# Patient Record
Sex: FEMALE | Race: WHITE
[De-identification: names, ages, dates, MRNs, and addresses within clinical notes are randomized per-mention and may not be internally consistent; named-entity substitution may affect disease eponyms.]

---

## 2018-03-01 ENCOUNTER — HOSPITAL ENCOUNTER (EMERGENCY)
Dept: HOSPITAL 68 - ERH | Age: 33
Discharge: STILL ADMITTED OR ADMINISTRATIVE | End: 2018-03-01
Payer: COMMERCIAL

## 2018-03-01 DIAGNOSIS — M54.5: Primary | ICD-10-CM

## 2018-04-28 ENCOUNTER — HOSPITAL ENCOUNTER (INPATIENT)
Dept: HOSPITAL 68 - ERH | Age: 33
LOS: 11 days | DRG: 280 | End: 2018-05-09
Attending: INTERNAL MEDICINE | Admitting: INTERNAL MEDICINE
Payer: COMMERCIAL

## 2018-04-28 VITALS — SYSTOLIC BLOOD PRESSURE: 109 MMHG | DIASTOLIC BLOOD PRESSURE: 65 MMHG

## 2018-04-28 VITALS — DIASTOLIC BLOOD PRESSURE: 59 MMHG | SYSTOLIC BLOOD PRESSURE: 112 MMHG

## 2018-04-28 VITALS — HEIGHT: 66 IN | BODY MASS INDEX: 18.8 KG/M2 | WEIGHT: 117 LBS

## 2018-04-28 DIAGNOSIS — D62: ICD-10-CM

## 2018-04-28 DIAGNOSIS — K29.80: ICD-10-CM

## 2018-04-28 DIAGNOSIS — E87.1: ICD-10-CM

## 2018-04-28 DIAGNOSIS — E87.79: ICD-10-CM

## 2018-04-28 DIAGNOSIS — K31.89: ICD-10-CM

## 2018-04-28 DIAGNOSIS — K85.90: ICD-10-CM

## 2018-04-28 DIAGNOSIS — D68.9: ICD-10-CM

## 2018-04-28 DIAGNOSIS — I85.01: ICD-10-CM

## 2018-04-28 DIAGNOSIS — K70.31: Primary | ICD-10-CM
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
## 2018-04-28 DIAGNOSIS — F10.20: ICD-10-CM

## 2018-04-28 DIAGNOSIS — D68.32: ICD-10-CM

## 2018-04-28 DIAGNOSIS — K70.11: ICD-10-CM

## 2018-04-28 DIAGNOSIS — N17.9: ICD-10-CM

## 2018-04-28 DIAGNOSIS — R27.8: ICD-10-CM

## 2018-04-28 DIAGNOSIS — K76.6: ICD-10-CM

## 2018-04-28 LAB
ABSOLUTE GRANULOCYTE CT: 12.7 /CUMM (ref 1.4–6.5)
APTT BLD: 30 SEC (ref 25–37)
BASOPHILS # BLD: 0 /CUMM (ref 0–0.2)
BASOPHILS NFR BLD: 0.2 % (ref 0–2)
EOSINOPHIL # BLD: 0.1 /CUMM (ref 0–0.7)
EOSINOPHIL NFR BLD: 0.4 % (ref 0–5)
ERYTHROCYTE [DISTWIDTH] IN BLOOD BY AUTOMATED COUNT: 14.1 % (ref 11.5–14.5)
GRANULOCYTES NFR BLD: 82.7 % (ref 42.2–75.2)
HCT VFR BLD CALC: 20.1 % (ref 37–47)
LYMPHOCYTES # BLD: 1.1 /CUMM (ref 1.2–3.4)
MCH RBC QN AUTO: 39 PG (ref 27–31)
MCHC RBC AUTO-ENTMCNC: 34.5 G/DL (ref 33–37)
MCV RBC AUTO: 113.3 FL (ref 81–99)
MONOCYTES # BLD: 1.5 /CUMM (ref 0.1–0.6)
PLATELET # BLD: 216 /CUMM (ref 130–400)
PMV BLD AUTO: 7.9 FL (ref 7.4–10.4)
PROTHROMBIN TIME: 15.1 SEC (ref 9.4–12.5)
RED BLOOD CELL CT: 1.78 /CUMM (ref 4.2–5.4)
WBC # BLD AUTO: 15.4 /CUMM (ref 4.8–10.8)

## 2018-04-28 PROCEDURE — 2NASP: CPT

## 2018-04-28 PROCEDURE — 84300 ASSAY OF URINE SODIUM: CPT

## 2018-04-28 PROCEDURE — 0W9G3ZX DRAINAGE OF PERITONEAL CAVITY, PERCUTANEOUS APPROACH, DIAGNOSTIC: ICD-10-PCS | Performed by: INTERNAL MEDICINE

## 2018-04-28 PROCEDURE — 84133 ASSAY OF URINE POTASSIUM: CPT

## 2018-04-28 PROCEDURE — G0480 DRUG TEST DEF 1-7 CLASSES: HCPCS

## 2018-04-28 PROCEDURE — P9016 RBC LEUKOCYTES REDUCED: HCPCS

## 2018-04-28 PROCEDURE — P9047 ALBUMIN (HUMAN), 25%, 50ML: HCPCS

## 2018-04-28 PROCEDURE — 30233N1 TRANSFUSION OF NONAUTOLOGOUS RED BLOOD CELLS INTO PERIPHERAL VEIN, PERCUTANEOUS APPROACH: ICD-10-PCS | Performed by: INTERNAL MEDICINE

## 2018-04-28 PROCEDURE — 87075 CULTR BACTERIA EXCEPT BLOOD: CPT

## 2018-04-28 NOTE — ULTRASOUND REPORT
EXAMINATION:
ABDOMINAL ULTRASOUND LIMITED
 
CLINICAL INFORMATION:
Abdominal pain. Ascites. Jaundice.
 
COMPARISON:
03/15/2018.
 
TECHNIQUE:
Real-time imaging of the right upper quadrant abdominal viscera.
 
FINDINGS:
PANCREAS: The visualized pancreatic head and body are normal in appearance.
The remainder of the pancreas is obscured from visualization by the overlying
bowel gas.
 
LIVER: The liver is of normal size and mildly coarsened echogenicity without
focal lesions nor intrahepatic biliary ductal dilation. There is subcapsular
nodularity demonstrable.
 
GALLBLADDER: There is no cholelithiasis. There is echogenic bile within the
gallbladder lumen. There is gallbladder wall thickening measuring
approximately 6 mm.
 
COMMON BILE DUCT: Normal in caliber measuring 0.3 cm in diameter.
 
RIGHT KIDNEY: Normal. No hydronephrosis. No renal calculi or focal
parenchymal lesions.  The kidney measures 11.2 cm in maximum dimension.
 
FREE FLUID: There is a moderate amount of free fluid noted within the upper
abdomen.
 
IMPRESSION:
Liver of coarse and echogenicity without focal lesions. There is subcapsular
nodularity. The appearance is nonspecific, but could be representative of
cirrhosis.
 
No hepatic mass lesions are demonstrable.
 
There is echogenic bile without cholelithiasis. There is gallbladder wall
thickening, likely accentuated due to adjacent ascites.
 
Moderate amount of ascites.

## 2018-04-28 NOTE — ED GENERAL ADULT
History of Present Illness
 
General
Chief Complaint: General Adult
Stated Complaint: BLOOD WORK
Source: patient, old records
Exam Limitations: no limitations
 
Vital Signs & Intake/Output
Vital Signs & Intake/Output
 Vital Signs
 
 
Date Time Temp Pulse Resp B/P B/P Pulse O2 O2 Flow FiO2
 
     Mean Ox Delivery Rate 
 
 1637 97.9 100 18 106/60  96 Room Air  
 
 1242 96.6 111 18 109/64  99 Room Air  
 
 
 
Allergies
Coded Allergies:
No Known Allergies (03/15/18)
 
Reconcile Medications
Calcium Carb/Magnesium Hydrox (Rolaids Chewable Tablet) (Unknown Strength) 
TAB.CHEW   (Unknown Dose) PO DAILY SUPPLEMENT  (Reported)
Furosemide 20 MG TABLET   1 TAB PO DAILY DIURETIC  (Reported)
Lactulose 10 GRAM/15 ML SOLUTION   15 ML PO DAILY PRN CONSTIPATION  (Reported)
Riboflavin (Vitamin B-2) (Unknown Strength) TABLET   (Unknown Dose) PO DAILY 
SUPPLEMENT  (Reported)
Spironolactone 50 MG TABLET   1 TAB PO DAILY DIURETIC  (Reported)
 
Triage Note:
PT TO ER SENT BY DR. CACERES FOR EVAL OF
HYPONATREMIA (117) YESTERDAY. PT JAUNDICE WITH
ACITIES, PT ?DENIES HX OF LIVER DISEASE, "THEY
DON'T KNOW WHAT IS WRONG WITH ME". PT HAD
PARACENTESIS 3/2018
Triage Nurses Notes Reviewed? yes
Onset: Gradual
Duration: week(s): (2 MONTHS), constant, continues in ED, getting worse
Timing: recent history
Injury Environment: home
Severity: moderate, severe
Severity Numbers: 6
No Modifying Factors: none
Pregnant: No
Patient currently breastfeeds: No
HPI:
32 female past medical history of alcohol dependence and liver cirrhosis 
presents for evaluation of abnormal labs.  Patient states that the past 2 months
she has had worsening swelling in her abdomen and has been jaundiced.  She had 
blood work done yesterday that showed hyponatremia she was sent in for a 
reevaluation.  Patient states that she feels weak and fatigued.  She denies any 
chest pain shortness of breath.  She does have some mild diffuse abdominal pain 
is present for 2 months.  No fevers at home.  She denies melena or bright red 
blood per rectum.  No vaginal bleeding or discharge.  No urinary symptoms.  Not 
take any blood thinners.  She is being followed by Dr. Caceres from 
gastroenterology.  She does admit to still drinking however she reports 1-2 
beers daily.  No history of withdrawal or seizures.  Patient also admits to 
drinking large amounts of water daily at least 1 gallon.
(Esteban Valle)
 
Past History
 
Travel History
Traveled to [place] past 21 day No
 
Medical History
Any Pertinent Medical History? see below for history
Neurological: NONE
EENT: NONE
Cardiovascular: NONE
Respiratory: NONE
Gastrointestinal: NONE
Hepatic: ?LIVER FAILURE
Renal: NONE
Musculoskeletal: NONE
Psychiatric: alcohol dependence
Endocrine: NONE
Blood Disorders: NONE
Cancer(s): NONE
GYN/Reproductive: NONE
 
Surgical History
Surgical History: non-contributory
 
Psychosocial History
What is your primary language English
Tobacco Use: Current Daily Use
Daily Tobacco Use Amount/Type: =< 4 Cigarettes daily
 
Family History
Hx Contributory? No
(Esteban Valle)
 
Review of Systems
 
Review of Systems
Constitutional:
Reports: malaise, weakness. 
EENTM:
Reports: no symptoms. 
Respiratory:
Reports: no symptoms. 
Cardiovascular:
Reports: no symptoms. 
GI:
Reports: abdominal pain.  Denies: see HPI. 
Genitourinary:
Reports: no symptoms. 
Musculoskeletal:
Reports: no symptoms. 
Skin:
Reports: no symptoms. 
Neurological/Psychological:
Reports: no symptoms. 
Hematologic/Endocrine:
Reports: no symptoms. 
Immunologic/Allergic:
Reports: no symptoms. 
All Other Systems: Reviewed and Negative
(Esteban Valle)
 
Physical Exam
 
Physical Exam
General Appearance: well developed/nourished, no apparent distress, alert, awake
, thin
Head: atraumatic, normal appearance
Eyes:
Bilateral: PERRL, EOMI, pale conjunctivae, other (scleral icterus). 
Ears, Nose, Throat: normal pharynx, normal ENT inspection, hearing grossly 
normal
Neck: normal inspection, supple, full range of motion
Respiratory: normal breath sounds, chest non-tender, no respiratory distress, 
lungs clear
Cardiovascular: regular rate/rhythm, normal peripheral pulses
Peripheral Pulses:
2+ radial (R), 2+ radial (L)
Gastrointestinal: normal bowel sounds, no organomegaly, distention, tenderness (
mild diffuse tenderness), diffuse distention and ascites
Rectal: normal rectal tone, heme positive stool, light brown stool heme-positive
Back: normal inspection, normal range of motion, no vertebral tenderness
Extremities: normal inspection, normal range of motion, no edema
Neurologic/Psych: no motor/sensory deficits, awake, alert, oriented x 3, normal 
gait
Skin: intact, warm/dry, jaundice, pallor
Lymphatic: no anterior cervical isabel
 
Core Measures
ACS in differential dx? No
CVA/TIA Diagnosis: No
Sepsis Present: No
Sepsis Focused Exam Completed? No
(Maxwell MEDEIROS,Esteban)
 
Progress
Differential Diagnoses
I considered the following diagnoses in my evaluation of the patient: [Cirrhosis
, end-stage liver disease,CHOLcystitis, pancreatic cancer, pancreatitis, 
spontaneous bacterial peritonitis]
 
Plan of Care:
 Orders
 
 
Procedure Date/time Status
 
OVA AND PARASITE ANTIGENS  UNK Active
 
BLOOD PRODUCT PICKUP  1745 Active
 
Patient Data  1744 Active
 
Add-on Test (ER Only)  1712 Active
 
Add-on Test (ER Only)  1701 Active
 
Admit to inpatient  1528 Active
 
CULTURE,BODY FLUID  1510 Active
 
BLOOD CULTURE  1510 Active
 
CYTOLOGY SPECIMEN  1510 Active
 
BODY FLUID TOTAL PROTEIN  1510 Complete
 
BODY FLUID LIPASE  1510 Complete
 
BODY FLUID CELL COUNT  1510 Complete
 
BODY FLUID ALBUMIN  1510 Complete
 
LEUKOCYTE POOR (PACKED CELLS)  1443 Active
 
CULTURE,URINE  1417 Active
 
URINE OSMOLALITY  1417 Complete
 
URINE LYTES, SPOT  1417 Complete
 
TYPE & SCREEN (NOT X-MATCH)  1403 Active
 
TOTAL IRON BINDING CAPACITY  1325 Active
 
FOLIC ACID  1325 Active
 
FERRITIN  1325 Active
 
ETHANOL  1325 Active
 
VITAMIN B12  1325 Active
 
CULTURE,STOOL  1305 Active
 
URINE DRUG SCREEN FOR ER ONLY  1254 Complete
 
URINE PREGNANCY  1248 Complete
 
URINALYSIS  1248 Complete
 
TROPONIN LEVEL  1248 Active
 
PARTIAL THROMBOPLASTIN TIME  1248 Complete
 
PROTHROMBIN TIME  1248 Complete
 
SERUM OSMOLALITY  1248 Active
 
MAGNESIUM  1248 Active
 
LIPASE  1248 Active
 
DIRECT BILIRUBIN  1248 Active
 
COMPREHENSIVE METABOLIC PANEL  1248 Active
 
CBC WITHOUT DIFFERENTIAL  1248 Complete
 
EKG  1248 Active
 
 
 Current Medications
 
 
  Sig/Reagan Start time  Last
 
Medication Dose  Stop Time Status Admin
 
Folic Acid 1 MG ONE ONE  1730 AC 
 
(Folic Acid)    1759  
 
Sodium Chloride 50 ML    
 
(Normal Saline 50ML      
 
Bag)     
 
Folic Acid 1 MG ONCE ONE  1715 CANr 
 
(Folic Acid)    1716  
 
Thiamine HCl 100 MG ONCE ONE  1715 AC 
 
(Vitamin B-1)   04/28 1814  
 
Sodium Chloride 50 ML    
 
(Normal Saline 50ML      
 
Bag)     
 
 
 Laboratory Tests
 
 
 
18 1552:
Fluid   H, Fld Total RBCs Counted 88  H
 
18 1552:
Lymphocytes 8, % Normal PMNs 14, Misc Hematology Test   , Fluid Total Protein < 
2.0, Fluid Albumin < 1.0, Fluid Lipase 225
 
18 1417:
Urinalysis MOD  H, Urine Color MARICHUY, Urine Clarity HAZY  H, Urine pH 5.5, Ur 
Specific Gravity 1.025, Urine Protein NEG, Urine Ketones NEG, Urine Nitrite POS 
H, Urine Bilirubin POS@ICTO  H, Urine Urobilinogen 2.0  H, Ur Leukocyte Esterase
NEG, Ur Microscopic SEDIMENT EXAMINED, Urine RBC 1-3, Ur Epithelial Cells MOD  H
, Urine Bacteria FEW  H, Hyaline Casts RARE  H, Urine Hemoglobin NEG, Urine 
Glucose NEG, Urine Pregnancy Test NEGATIVE
 
18 1417:
Urine Osmolality 387, Ur Random Creatinine 169.0, Ur Random Sodium < 5  L, Ur 
Random Potassium 48.1, Fraction Sodium Excret   
 
18 1417:
Urine Opiates Screen < 100, Methadone Screen < 40, Barbiturate Screen < 60, Ur 
Phencyclidine Scrn < 6.00, Amphetamines Screen 156, U Benzodiazepines Scrn < 85,
Urine Cocaine Screen < 50, Urine Cannabis Screen < 5.00, Urine Osmolality 
Cancelled, Ur Random Creatinine Cancelled, Ur Random Sodium Cancelled, Ur Random
Potassium Cancelled, Fraction Sodium Excret Cancelled
 
18 1325:
Anion Gap 10, Estimated GFR 51  L, BUN/Creatinine Ratio 30.0  H, Glucose 115  H,
Serum Osmolality 264  L, Calcium 8.2  L, Magnesium 2.0, TIBC Pending, Ferritin 
Pending, Total Bilirubin 8.4  H, Direct Bilirubin 6.7  H,   H, ALT 52, 
Alkaline Phosphatase 293  H, Troponin I < 0.01, Total Protein 5.9  L, Albumin 
2.6  L, Globulin 3.3, Albumin/Globulin Ratio 0.8  L, Lipase 2054  H, Vitamin B12
Pending, Folate Pending, PT 15.1  H, INR 1.38  H, APTT 30, CBC w Diff NO MAN 
DIFF REQ, RBC 1.78  L, .3  H, MCH 39.0  H, MCHC 34.5, RDW 14.1, MPV 7.9, 
Gran % 82.7  H, Lymphocytes % 7.0  L, Monocytes % 9.7  H, Eosinophils % 0.4, 
Basophils % 0.2, Absolute Granulocytes 12.7  H, Absolute Lymphocytes 1.1  L, 
Absolute Monocytes 1.5  H, Absolute Eosinophils 0.1, Absolute Basophils 0, Serum
Alcohol 13.0
 
18 1254:
Serum Alcohol Cancelled
 Microbiology
 1552  BODY FLUID: Body Fluid Culture - RECD
 1552  BODY FLUID: Gram Stain - RECD
 1510  BLOOD: Blood Culture - ORD
 1510  BLOOD: Blood Culture - ORD
 1417  URINE ROUT: Urine Culture - RECD
 UNK  GI: Cryptosporidium Antigen - RECD
 UNK  GI: Giardia Antigen (URVASHI) - RECD
 UNK  STOOL: Stool Culture - RECD
 
Patient seen and evaluated.  She appears diffusely jaundiced and pale.  She is 
mildly tachycardic.  Her abdomen is distended with ascites and mildly tender.  
No rebound tenderness or guarding.  She is afebrile.  Symptoms have been going 
on for 2 months now.  Blood work was obtained and shows severe hyponatremia with
a level of 118.  Additionally she shows a significant drop in her hemoglobin.  
One month ago her hemoglobin was 12 now it is 6.9.   white blood cell count of 
15,000.  Lipase also significantly elevated.  This may be due to patient's 
persistent drinking.  Rectal exam is positive for occult blood.  An ultrasound 
will be checked.  GI was paged.
 
Spoke with Dr. Nielsen from .  She recommends a diagnostic paracentesis to rule
out spontaneous bacterial peritonitis.  She also recommends prophylactic 
Rocephin.  Ultrasound shows evidence of cirrhosis and moderate ascites.  Patient
was medicated with IV fluids IV thiamine IV folate.
 
Paracentesis: The area was prepped and draped in usual sterile fashion. 
Ultrasound was placed over the right lower quadrant the abdomen and a definite 
large fluid pocket.  1% lidocaine without epi was used for local pain control.  
An 18-gauge needle was inserted through the abdominal wall using the Z 
technique.  Ultrasound confirmed placement in the abdominal ascites fluid.  60 
mL of ascites fluid was obtained.  Sterile dressing applied.  Patient tolerated 
well without immediate Complication.  Ascites fluid sent to lab for analysis.  
Dr. FISHER was present during the procedure.
 
Patient will be admitted to the hospital for further evaluation and treatment of
multiple medical problems including hyponatremia, anemia, GI bleed, acute 
pancreatitis and liver cirrhosis.  Case discussed with DR FISHER HE AGREES. 
Diagnostic Imaging:
Viewed by Me: Ultrasound.  Discussed w/RAD: Ultrasound. 
Radiology Impression: PATIENT: JOSSELYN ERNST  MEDICAL RECORD NO: [id number] 
PRESENT AGE: 32  PATIENT ACCOUNT NO: [id number] : [birth year]  LOCATION: [facility] 
ORDERING PHYSICIAN: Esteban MEDEIROS     SERVICE DATE:  EXAM TYPE: US 
- US-LIMITED ABDOMEN EXAMINATION: ABDOMINAL ULTRASOUND LIMITED CLINICAL 
INFORMATION: Abdominal pain. Ascites. Jaundice. COMPARISON: 03/15/2018. 
TECHNIQUE: Real-time imaging of the right upper quadrant abdominal viscera. 
FINDINGS: PANCREAS: The visualized pancreatic head and body are normal in 
appearance. The remainder of the pancreas is obscured from visualization by the 
overlying bowel gas. LIVER: The liver is of normal size and mildly coarsened 
echogenicity without focal lesions nor intrahepatic biliary ductal dilation. 
There is subcapsular nodularity demonstrable. GALLBLADDER: There is no 
cholelithiasis. There is echogenic bile within the gallbladder lumen. There is 
gallbladder wall thickening measuring approximately 6 mm. COMMON BILE DUCT: 
Normal in caliber measuring 0.3 cm in diameter. RIGHT KIDNEY: Normal. No 
hydronephrosis. No renal calculi or focal parenchymal lesions.  The kidney 
measures 11.2 cm in maximum dimension. FREE FLUID: There is a moderate amount of
free fluid noted within the upper abdomen. IMPRESSION: Liver of coarse and 
echogenicity without focal lesions. There is subcapsular nodularity. The 
appearance is nonspecific, but could be representative of cirrhosis. No hepatic 
mass lesions are demonstrable. There is echogenic bile without cholelithiasis. 
There is gallbladder wall thickening, likely accentuated due to adjacent 
ascites. Moderate amount of ascites. DICTATED BY: Alexis Vieira MD  DATE/TIME 
DICTATED:18 :RAD.CAVAZOS  DATE/TIME TRANSCRIBED:1448 CONFIDENTIAL, DO NOT COPY WITHOUT APPROPRIATE AUTHORIZATION.
Initial ED EKG: SINUS TACH RATE 102
(Esteban Valle)
 
Departure
 
Departure
Disposition: STILL A PATIENT
Condition: Stable
Clinical Impression
Primary Impression: Hyponatremia
Secondary Impressions: 
Anemia
Qualifiers:  Anemia type: unspecified type Qualified Code: D64.9 - Anemia, 
unspecified
 
Ascites due to alcoholic cirrhosis
 
Pancreatitis
Qualifiers:  Chronicity: acute Pancreatitis type: alcohol induced Acute 
pancreatitis complication: unspecified Qualified Code: K85.20 - Alcohol induced 
acute pancreatitis without necrosis or infection
 
Referrals:
Patient Has No Primary Care Dr (PCP/Family)
 
Departure Forms:
Customer Survey
General Discharge Information
 
Admission Note
Spoke With:
Dimas Skelton MD
Documentation of Exam:
Documentation of any treatments & extenuating circumstances including Concerns 
Regarding Discharge (functional status, medication knowledge or non-compliance, 
living conditions, etc.) that warrant an admission rather than observation: [
Serial labs, transfusion, GI consult, colonoscopy, IV fluids, nothing by mouth, 
IV antibiotics, follow-up cultures, water restriction, sodium repletion, ICU 
monitoring, telemetry]
 
(Esteban Valle)
 
PA/NP Co-Sign Statement
Statement:
ED Attending supervision documentation-
 
x I saw and evaluated the patient. I have also reviewed all the pertinent lab 
results and diagnostic results. I agree with the findings and the plan of care 
as documented in the PA's/NP's documentation. Worsening jaundice, ascites with 
hyponatremia, anemia.  Diagnostic paracentesis supervised.
 
[] I have reviewed the ED Record and agree with the PA's/NP's documentation.
 
[] Additions or exceptions (if any) to the PAs/NP's note and plan are 
summarized below:
[]
 
(Iram CORNEJO,Giuseppe)
 
Procedures
 
Additional Procedures
Additional Procedures: diagnostic paracentesis
(Esteban Valle)
 
Critical Care Note
 
Critical Care Note
Critical Care Time: non-applicable
(Black PA,Esteban)

## 2018-04-28 NOTE — HISTORY & PHYSICAL
IleanaWhitney 04/28/18 0010:
General Information and HPI
Source of Information: patient, Electronic medical record
Exam Limitations: no limitations
History of Present Illness:
 
Ms Barros is a 32-year-old female with a PMHx of alcohol abuse was sent into the
hospital by her Gastroenterologist when she was found to have Sodium level of 
117.  
 
She was known to be in her usual state of health until 2 months ago, when she 
noticed that she had abdominal distention and was evaluated in the emergency 
room at [facility].  She underwent large volume abdominal paracentesis at 
that time, and and was recommended to follow up with Dr. Caceres.  As per the 
patient, she was started on Lasix and spironolactone by Dr. Caceres, and has been 
compliant with her medications. No bleeding per rectum or melena noted.  No 
nausea vomiting or diarrhea.  She did not have any upper or lower endoscopy 
done. 
 
She drinks alcohol regularly with approximately 2-3 beers in a week, and the 
last drink was the night before.  History of heavy alcohol use in the last 5-10 
years of wine daily.  No history of alcohol which all seizures, or admissions to
any other hospital for alcohol detox.  Nonsmoker with 10-pack-year smoking 
history.  No intravenous drug use.
 
Reported increasing lethargy and had orthostatically dizzy.  No loss of 
consciousness, vision changes noted.  She reported generalized abdominal 
distention in the last 1 month associated with abdominal discomfort, with 
worsening after she takes her medications.  Reported jaundice in the last few 
weeks.  Discomfort lasted for approximately a few hours after taking 
medications.  Did not have any fever, dysuria, cough, or shortness of breath. 
Reported dark urine, but did not have any hematuria.
 
She doesnt follow up with PCP regularly, and used to see Dr. Saldivar many years
ago. 
 
 
 
 
Allergies/Medications
Allergies:
Coded Allergies:
No Known Allergies (03/15/18)
 
Home Med list
Calcium Carb/Magnesium Hydrox (Rolaids Chewable Tablet) (Unknown Strength) 
TAB.CHEW   (Unknown Dose) PO DAILY SUPPLEMENT  (Reported)
Furosemide 20 MG TABLET   1 TAB PO DAILY DIURETIC  (Reported)
Lactulose 10 GRAM/15 ML SOLUTION   15 ML PO DAILY PRN CONSTIPATION  (Reported)
Riboflavin (Vitamin B-2) 25 MG TABLET   1 TAB PO DAILY SUPPLEMENT  (Reported)
Spironolactone 50 MG TABLET   1 TAB PO DAILY DIURETIC  (Reported)
 
 
Past History
 
Travel History
Traveled to [place] past 21 day No
 
Medical History
Neurological: NONE
EENT: NONE
Cardiovascular: NONE
Respiratory: NONE
Gastrointestinal: NONE
Hepatic: ?LIVER FAILURE
Renal: NONE
Musculoskeletal: NONE
Psychiatric: alcohol dependence
Endocrine: NONE
Blood Disorders: NONE
Cancer(s): NONE
GYN/Reproductive: NONE
 
Surgical History
Surgical History: non-contributory
 
Past Family/Social History
 
Family History
Relations & Conditions if any
FATHER (CAD).
 
 
Functional Ability
ADLs
Needs Assist: dressing, eating. 
Ambulation: independent
IADLs
Independent: shopping, housework, telephone.  Unknown: finances, food prep. 
 
Employment History
Employment Employed (care taker)
 
Review of Systems
 
Review of Systems
Constitutional:
Denies: chills, fever. 
EENTM:
Denies: visual changes. 
Cardiovascular:
Denies: chest pain. 
Respiratory:
Denies: cough, short of breath. 
GI:
Reports: abdominal pain.  Denies: melena, nausea, bloody stool. 
Genitourinary:
Denies: discharge. 
Musculoskeletal:
Denies: back pain. 
Skin:
Reports: change in skin color, jaundice. 
Neurological/Psychological:
Reports: paresthesia. 
Hematologic/Endocrine:
Denies: bruising, bleeding. 
 
Exam & Diagnostic Data
Last 24 Hrs of Vital Signs/I&O
 Vital Signs
 
 
Date Time Temp Pulse Resp B/P B/P Pulse O2 O2 Flow FiO2
 
     Mean Ox Delivery Rate 
 
04/28 1950 98.0 108 18 106/62  98 Room Air  
 
04/28 1935 98.1 104 18 110/65  99 Room Air  
 
04/28 1847 98.3 104 18 110/61  97 Room Air  
 
04/28 1637 97.9 100 18 106/60  96 Room Air  
 
04/28 1242 96.6 111 18 109/64  99 Room Air  
 
 
 Intake & Output
 
 
 04/28 1600 04/28 0800 04/28 0000
 
Intake Total 0  
 
Output Total 0  
 
Balance 0  
 
    
 
Intake, Oral 0  
 
Output, Urine 0  
 
Patient 120 lb  
 
Weight   
 
 
 
 
Physical Exam
General Appearance Alert, Oriented X3, Cooperative, No Acute Distress
Skin No Breakdown, spider telangiectasias, increased vascular markings, but not 
caput medusae
Skin Temp/Moisture Exam: Warm/Dry
Sepsis Skin Exam (color): Normal for Ethnicity, Jaundiced
HEENT Atraumatic, PERRLA, EOMI, Mucous Membr. moist/pink
Neck Supple, No JVD, No thryomegaly
Lymphatic Cervical nl
Cardiovascular Regular Rate, Normal S1, Normal S2, No Murmurs
Lungs Clear to Auscultation, Normal Air Movement
Abdomen Normal Bowel Sounds, abdominal distention fluid thrill + shifting 
dullness +
Neurological Normal Speech, Strength at 5/5 X4 Ext, Normal Tone, Sensation 
Intact, Cranial Nerves 3-12 NL, Reflexes 2+, cerebellar tests normal
Extremities No Clubbing, No Cyanosis, No Edema, Asterexis +
Vascular Pulses Symmetrical
 
Assessment/Plan
Assessment:
 
Ms Barros is  a 32-year-old woman with a past medical history of alcohol abuse 
came to the hospital with evaluation of acute hyponatremia likely secondary to 
decompensated cirrhosis.
 
At the time of admission-temperature 96.6, pulse rate 111, respiration 18, blood
pressure 109/64, pulse ox 99% on room air.  She was found to have stool 
Hemoccult positve.  He underwent diagnostic paracentesis while in the emergency 
room. 
 
Pertinent labs at the time of admission-
 
WBC 15.4, hemoglobin 6.9 (12.1 on 03/15/2018), HCT 20.1,  (macrocytosis),
platelet count 216.  Sodium 118, potassium 4.2, chloride 79, BUN 36, creatinine 
1.2 (Baseline around 0.3 on 03/15/2018).  Serum osmolality 264 ( hypoosmolar ), 
urine sodium less than 5, FeNa not calculated  Total bilirubin 8.4, direct 
bilirubin 6.7 (elevated), , AST 52 ( AST/ALT 2:1 consistent w/ alcohol 
use), alkaline phosphatase 293, lipase 2054.  INR 1.38.  Albumin 2.6.
 
Paracentesis fluid .  Fluid albumin 1.0.  SAAG 1.6 and ascitic fluid < 
2.5 ( consistent w/ portal hypertension).  Discriminant function of 24.  Yoli 
C, with one-year estimated mortality of approximately 55%.
 
U tox negative.  Serum alcohol 13.0.  Urinalysis revealed urine nitrate positive
, leukocyte esterase negative. 
 
US abdomen:  Liver of coarse and echogenicity without focal lesions. There is 
subcapsular nodularity. The appearance is nonspecific, but could be 
representative of cirrhosis. No hepatic mass lesions are demonstrable.  There is
echogenic bile without cholelithiasis. There is gallbladder wall thickening, 
likely accentuated due to adjacent ascites. Moderate amount of ascites.
 
#1 acute hyponatremia, hypervolemic hypoosmolar hyponatremia
#2 decompensated cirrhosis, likely alcohol related
#3 moderate ascites
#4 leukocytosis
#5 acute pancreatitis
#6 anemia, likely ABLA
#7 alcohol abuse
#8 macrocytosis
#9 BRETT likey pre-renal
#10 r/o HRS type 1
#11 Asterexis, likely HE
 
 
 
Management plan:
 
Respiratory: Currently stable at this time. Check chest x-ray to rule out any 
consolidation, or fluid overload.
 
Infectious: Leukocytosis, with unclear etiology.  Also has granulocytosis 
indicating an acute infection.  Since she has leukocytosis on her paracentesis, 
not meeting subacute bacterial criteria; given heme positive stools will treat 
her with ceftriaxone 2 g daily.  Check urinalysis and urine culture.  Blood 
cultures drawn in the ER.  will follow up ascitic fluid culture. 
 
Circulatory: Blood pressure remains borderline.  Given her history of cirrhosis,
and elevated serum creatinine would keep hepatorenal syndrome in the 
differential.  Hold Lasix, spironolactone at this time.  Consider Midodrine/
octreotide if essential.  EKG did not show any ST-T wave changes.
 
Hematology: She is an acute drop of hemoglobin, likely from ABLA.  She would 
need 1 unit of PRBC with a goal of hemoglobin of 8, given possible SBP.  
Continue to monitor H&H closely.  Transfusion of colloid, including albumin will
likely increase intravascular volume.  Monitor for any bleeding complications.  
Don't over zealously transfuse, since we do not know if the patient has any 
esophageal or rectal varices.
 
Metabolic: Serum creatinines elevated, which would likely be prerenal.  Recheck 
urine lites in the a.m.  Hypervolemic hypoosmolar hyponatremia could likely be 
due to cirrhosis, and decreased intravascular volume.  Check BEP every 4 hours, 
with a goal correction of not more than 8 mEq in the next 24 hours.  If the BEP 
is overly corrected, would use D5 water.  Fluid restriction at this time of not 
more than 500 mL per day as per renal.  Albumin 1.5 g per KG body weight as per 
the gastroenterologist, as per SBP albumin+Ceftriaxone protocol.  Continue 
albumin 25 mg twice a day, as per renal.
 
Alimentary: Nothing by mouth at this time.  She has a history of alcohol use, 
and likely has pancreatitis given abdominal pain and elevated lipase.  
Ultrasound did not reveal any abnormal pancreatic pathology, but would keep 
acute pancreatitis in differential.  Given her hyponatremia, the management 
would be conservative without fluids for now. Check hepatitis panel. 
 
Neurology: Stable at this time.  Pain management as required with oxycodone. 
Avoid morphine since it worsens pancreatitis. In regards to alcohol use, would 
monitor CIWA; and lorazepam as per CIWA. No scheduled dosing at this time. If 
she stars to require any lorazepam, she would need scheduled dosing. Check 
ammonia. 
 
 
DVT PPx- Alps at this time, given heme positive stools, and anemia. Once resolve
, she should be on heparin given abnormal liver function. 
GI PPx- Protonix iv 
HE PPx- None at this time. Would consider Rifaxamin, given her h/o BRETT and 
inability to use lactulose at this  time. Although Rifaxamin is not clearly 
indicated in her first decompensation. 
 
Last EGD date: none. 
Last Screening US: 04/29/18
Hepatitis vaccination: date unknown. Check panel, and consider vaccination
Yoli C. 
Transplant evaluation: unknown, and would need sobriety for almost an year. 
 
 
 
#1 Central line- none 
#2 Arterial line- none( removed )
#3 Joyce catheter ( in place ) 
#4 Rectal tube ( none ) 
#5 NG tube ( none ) 
#6 IV/peripheral line- in place. 
#7 IV drips ( none ) 
#8 Vent settings: ( none ) 
#9 pressors ( none )
 
 
 
As Ranked By This Provider
Problem List:
 1. Anemia
   Qualifiers
 Anemia type: unspecified type Qualified Code: D64.9 - Anemia, unspecified
 
 2. Pancreatitis
   Qualifiers
 Chronicity: acute Pancreatitis type: alcohol induced Acute pancreatitis 
complication: unspecified Qualified Code: K85.20 - Alcohol induced acute 
pancreatitis without necrosis or infection
 
 3. Ascites due to alcoholic cirrhosis
 
 4. Hyponatremia
 
 
Core Measures/Misc (9/17)
 
Acute Coronary Syndrome
ACS Diagnosis: No
 
Congestive Heart Failure
Congestive Heart Failure Diagnosis No
 
Cerebrovascular Accident
CVA/TIA Diagnosis: No
 
VTE (View Protocol)
VTE Risk Factors Acute Medical Illness
No Mechanical VTE Prophylaxis d/t N/A MechProphylax Ordered
No VTE Pharm Prophylaxis d/t Other (guaiac positive stools)
 
Sepsis (View protocol)
Sepsis Present: No
 
 
Babatunde CORNEJO, hospitals 04/29/18 0350:
Attending MD Review Statement
 
Attending Statement
Attending MD Statement: examined this patient, discuss w/resident/PA/NP, agreed 
w/resident/PA/NP, discussed with family, reviewed EMR data (avail), reviewed 
images, amended to note
Attending Assessment/Plan:
33 yo F smoker with significant alcohol abuse history, never admitted for 
alcohol detox or medical problems, is sent in by Dr. Watlers (GI) for low 
sodium levels (117). 
 
Patient reports clinical deterioration over the past two months when she noticed
abdominal distension, yellow discoloration of skin and exertional dyspnea. She 
was evaluated in the ER on March 15th with blood work revealing Creat 0.3, 
sodium 131, T. Bili 6.2 and H/H 12/35. Abdomen ultrasound showed mild ascites 
with echogenic enlarged liver. She was referred to Dr. Caceres for further 
evaluation. She was started on lasix and spironolactone. Subsequently, she 
underwent large volume therapeutic paracentesis (4.2L) on April 2nd. She reports
compliance with lasix and spironolactone. Her abdominal distension reoccurred 
and she reports diffuse abdominal pain. C/o lethargy and positional 
lightheadedness. No nausea, vomiting, melena, hematemesis or BRBPR. No fever or 
chills. No confusion, disorientation or seizure like activity. No excessive 
NSAID or tylenol use. Denies IV drug use. No previous EGD/colonoscopy. Patient 
reports she drinks because of boredom. She reports cutting back on alcohol 
intake for past 2 weeks.
Patient had blood work done yesterday, results of which were reported to on-call
GI Dr. Walters, who then asked patient to come to ER. 
 
Vitals: Tmax 100.7, HR tachycardic to 100-110's, /65 --> 95/53, sats 97% 
RA.
Exam: awake, alert, oriented, not confused, pallor++, icterus and jaundice++, 
cachectic, msucle wasting+, PERRL, Chest spider angiomas+, clear to auscultation
, Heart S1S2 regular, Abd distended, tense, ascites++, diffuse tenderness+. LE 
no edema. Asterixis+. 
Rectal: light brown, heme positive stool.
Labs: WBC 15.4, H/H 6.9/20.1, macrocytic anemia, Plt 216, INR 1.38, Na 118, BUN 
36, creat 1.2 (was 0.3 in March), S. Osm 264, Ca 8.2, Mag 2.0, T. Bili 8.4, D. 
Bili 6.7, , ALT 52, Alk phos 293, trop neg, albumin 2.6, Lipase 2054. 
UA nitrite positive, LE neg, few bacteria. Urine tox screen negative. Alcohol 
13. Hep panel negative (March 15), HIV negative.
EKG: sinus tachycardia, no acute changes.
CXR: unremarkable exam.
Abdomen ultrasound: liver coarse and echogenic with subcapsular nodularity due 
to cirrhosis, moderate amount of ascites.
Diagnostic paracentesis was performed in ER: fluid , PMNs 14% - does not 
meet criteria for SBP.
 
Assessment and plan:
1. Acute hyponatremia due to a combination of cirrhosis and diuretic use
2. Alcoholic hepatitis with Maddrey's score of 24
3. Alcoholic cirrhosis with ascites, SAAG > 1.1 suggestive of portal 
hypertension
4. ABLA with heme positive stools, possible upper GI bleed
5. Symptomatic anemia
6. BRETT likely due to diuretics, watch for hepatorenal syndrome
7. Elevated lipase concerning for pancreatitis, although ultrasound shows normal
pancreas
8. Abdominal pain, leukocytosis, rule out SBP
9. Alcohol abuse/ dependence
10. Coagulopathy
 
- Admit to ICU
- Vitals Q1 hour
- Check orthostats
- Neurochecks, watch for confusion or altered mentation
- Clear liquid diet 
- Check urine lytes and urine osmolality
- Fluid restriction to 500 mls/ 24 hrs per Nephro recs
- Check sodium levels Q4, goal is 8 mEq in 24 hours
- Continue albumin TID
- Hold lasix and spironolactone
- Check cortisol levels
- Nephro consult
- Type and crossmatch
- Transfuse 1 unit PRBC, goal Hb >7.0
- Will avoid over transfusion as presence of esophageal varices in unclear
- Repeat CBC Q8
- Check iron studies, B12, folic acid, LDH, retic count, hemolysis work up (add 
on to ER labs)
- GI consult (seen in ER)
- IV protonix BID
- Eventual EGD
- Check tylenol levels and salicylate levels
- [facility] consult
- Panculture
- IV ceftriaxone 2 mg given in ER for SBP prophylaxis. Ascitic fluid PMN is < 
250 cells/mm3, however given leukocytosis and Tmax 100.7, will continue 
antibiotics until fluid culture is resulted.
- Follow urine and blood cultures
- CIWA protocol, as needed ativan for withdrawal symptoms
- Smoking cessation counseling, nicotine patch
DVT ppx Alps. Full code.
 
TTS > 55 mins

## 2018-04-28 NOTE — RADIOLOGY REPORT
EXAMINATION:
XR PORTABLE CHEST
 
CLINICAL INFORMATION:
Leukocytosis.
 
COMPARISON:
None
 
TECHNIQUE:
Portable frontal view of the chest was obtained.
 
FINDINGS:
No significant abnormality is noted involving the heart, lungs, mediastinum,
bony thorax or soft tissues.
 
IMPRESSION:
Unremarkable examination.

## 2018-04-28 NOTE — CONS- GASTROENTEROLOGY
General Information and HPI
 
Consulting Request
Date of Consult: 04/28/18
Requested By:
JOSIE Arreola Eric
 
Reason for Consult:
1.  Jaundice
2.  Cirrhosis
3.  Ascites
4.  Leukocytosis
5.  Elevated lipase
6.  Anemia
7.  Hemoccult positive stool
 
Source of Information: patient, Electronic medical record
Exam Limitations: no limitations
History of Present Illness:
Patient is a 32-year-old female with a long history of alcohol abuse who is 
followed in the outpatient setting by Dr. Puneet Caceres.  She had recently 
developed ascites and had been started on Lasix 20 mg and Spironolactone 100 mg 
in March of this year.  I had been called by the laboratory last night with a 
critical value of a serum sodium of 117.  I called her at her home last night 
and was unable to reach her.  I called again this morning and asked her to come 
in for repeat laboratory studies.  She told me that she was feeling weak and had
symptoms of orthostasis.  I told her that she should consider being evaluated at
the [place] emergency department.
 
Patient was seen at the [place] ED.  On admission she was found to have marked 
ascites.  Ms. Olivo tells me that she had a large-volume paracentesis 
approximately 1 month ago and that her ascites recurred almost immediately.  She
reports that she has had diffuse abdominal pain for some time.  She has had no 
melena nor bright red blood per rectum.  She has had no nausea, vomiting or 
hematemesis.  She has never had an EGD. In the ED her stools were light brown 
but Hemoccult positive.  She was also found to have severe anemia.  
 
On admission her H&H was 6.9/20.1 with 216,000 platelets.  WBC were 15.4.  Her 
MCV was 113.3 with an RDW of 14.1.  Her total bilirubin was 8.4 with a direct 
bilirubin of 6.7.  Her AST/ALT was 147/52.  PT/INR was 15.1/1.38.  Albumin was 
2.6 and a repeat serum sodium was 118.  In March her serum sodium had been 130. 
Her BUN/creatinine was 36/1.2.  She denies ongoing alcohol abuse and reports 
that she drinks a couple of beers a couple of times weekly, however, her blood 
alcohol level ws 13.0 where normal is less than 10.0. She denies chronic ongoing
use of ASA or NSAIDs.
 
In March of this year her albumin was 3.0, Total bilirubin was 6.2, direct 
bilirubin was 4.2.  Her H/H was 12.1/35.1 and platelets were 150,000.  BUN/Cr 
was 3/0.3.  
 
She denies confusion, changes in her sleep-wake cycle or motor function and 
skill.  She denies a history of pancreatitis.
 
Allergies/Medications
Allergies:
Coded Allergies:
No Known Allergies (03/15/18)
 
Home Med List:
Calcium Carb/Magnesium Hydrox (Rolaids Chewable Tablet) (Unknown Strength) 
TAB.CHEW   (Unknown Dose) PO DAILY SUPPLEMENT  (Reported)
Furosemide 20 MG TABLET   1 TAB PO DAILY DIURETIC  (Reported)
Lactulose 10 GRAM/15 ML SOLUTION   15 ML PO DAILY PRN CONSTIPATION  (Reported)
Riboflavin (Vitamin B-2) (Unknown Strength) TABLET   (Unknown Dose) PO DAILY 
SUPPLEMENT  (Reported)
Spironolactone 50 MG TABLET   1 TAB PO DAILY DIURETIC  (Reported)
 
Current Medications:
 Current Medications
 
 
  Sig/Reagan Start time  Last
 
Medication Dose Route Stop Time Status Admin
 
Ceftriaxone Sodium 1,000 MG ONCE ONE 04/28 1615 DC 
 
  IV 04/28 1616  
 
Folic Acid 1 MG ONE ONE 04/28 1730 AC 
 
Sodium Chloride 50 ML IV 04/28 1759  
 
Folic Acid 1 MG ONCE ONE 04/28 1715 CANr 
 
  IV 04/28 1716  
 
Lidocaine 20 ML ONCE ONE 04/28 1530 DC 04/28
 
  ID 04/28 1531  1530
 
Sodium Chloride 1,000 ML BOLUS ONE 04/28 1300 DC 04/28
 
  IV 04/28 1359  1638
 
Thiamine HCl 0 .STK-MED ONE 04/28 1724 DC 
 
  .ROUTE   
 
Thiamine HCl 100 MG ONCE ONE 04/28 1715 AC 
 
Sodium Chloride 50 ML IV 04/28 1814  
 
 
 
 
Past History
 
Travel History
Traveled to [place] past 21 day No
 
Medical History
Neurological: NONE
EENT: NONE
Cardiovascular: NONE
Respiratory: NONE
Gastrointestinal: NONE
Hepatic: ?LIVER FAILURE
Renal: NONE
Musculoskeletal: NONE
Psychiatric: alcohol dependence
Endocrine: NONE
Blood Disorders: NONE
Cancer(s): NONE
GYN/Reproductive: NONE
 
Surgical History
Surgical History: non-contributory
 
Exam & Diagnostic Data
Vital Signs and I&O
Vital Signs
 
 
Date Time Temp Pulse Resp B/P B/P Pulse O2 O2 Flow FiO2
 
     Mean Ox Delivery Rate 
 
04/28 1637 97.9 100 18 106/60  96 Room Air  
 
04/28 1242 96.6 111 18 109/64  99 Room Air  
 
 
 Intake & Output
 
 
 04/28 1600 04/28 0400 04/27 1600 04/27 0400 04/26 1600 04/26 0400
 
Intake Total 0     
 
Output Total 0     
 
Balance 0     
 
       
 
Intake, Oral 0     
 
Output, Urine 0     
 
Patient 120 lb     
 
Weight      
 
 
 
 
Physical Exam
General Appearance: no apparent distress, cachetic
Head: atraumatic, normal appearance
Eyes:
Bilateral: normal appearance, PERRL. 
Ears, Nose, Throat: hearing grossly normal
Neck: normal inspection, supple, full range of motion
Respiratory: normal breath sounds, chest non-tender, no respiratory distress, 
lungs clear
Cardiovascular: regular rate/rhythm, normal S1 and S2 without rub, murmur, or 
gallop.
Gastrointestinal: normal bowel sounds, non-tender, distention, there is ascites 
present, there is mild diffuse tenderness without rebound or guarding.  There is
prominent vascularity over the anterior abdominal wall.  There is a small 
periumbilical hernia which is not incarcerated.  Given ascites I am unable to 
palpate either liver or spleen.
Rectal: heme positive stool
Extremities: no edema
Neurologic/Psych: no motor/sensory deficits, awake, alert, oriented x 3
Cranial Nerves: cranial nerves II through XII are grossly intact
Skin: jaundice, there are marked and diffuse telangiectasia over the anterior 
chest
 
Results
Pertinent Lab Results:
 Laboratory Tests
 
 
 04/28 04/28 04/28
 
 1552 1552 1417
 
Chemistry   
 
  Albumin  Pending 
 
Other Body Source   
 
  Fluid WBC Pending  
 
  Fld Total RBCs Counted Pending  
 
  Fluid Total Protein  Pending 
 
  Fluid Albumin  Pending 
 
  Fluid Lipase  Pending 
 
Urines   
 
  Urinalysis   MOD  H
 
  Urine Color (YEL,AMB,STR)   MARICHUY
 
  Urine Clarity (CLEAR)   HAZY  H
 
  Urine pH (5.0 - 8.0)   5.5
 
  Ur Specific Gravity (1.001 - 1.035)   1.025
 
  Urine Protein (NEG,<30 MG/DL)   NEG
 
  Urine Ketones (NEG)   NEG
 
  Urine Nitrite (NEG)   POS  H
 
  Urine Bilirubin (NEG)   POS@ICTO  H
 
  Urine Urobilinogen (0.1  -  1.0 EU/dl)   2.0  H
 
  Ur Leukocyte Esterase (NEG)   NEG
 
  Ur Microscopic   SEDIMENT EXAMINED
 
  Urine RBC (0 - 5 /HPF)   1-3
 
  Ur Epithelial Cells (NONE,FEW)   MOD  H
 
  Urine Bacteria (NEG/NONE)   FEW  H
 
  Hyaline Casts (0/LPF)   RARE  H
 
  Urine Hemoglobin (NEG)   NEG
 
  Urine Glucose (N MG/DL)   NEG
 
  Urine Pregnancy Test   NEGATIVE
 
 
 
 
 04/28 04/28 04/28
 
 1417 1417 1325
 
Chemistry   
 
  Sodium (137 - 145 mmol/L)   118 *L
 
  Potassium (3.5 - 5.1 mmol/L)   4.2
 
  Chloride (98 - 107 mmol/L)   79  L
 
  Carbon Dioxide (22 - 30 mmol/L)   29
 
  Anion Gap (5 - 16)   10
 
  BUN (7 - 17 mg/dL)   36  H
 
  Creatinine (0.5 - 1.0 mg/dL)   1.2  H
 
  Estimated GFR (>60 ml/min)   51  L
 
  BUN/Creatinine Ratio (7 - 25 %)   30.0  H
 
  Glucose (65 - 99 mg/dL)   115  H
 
  Serum Osmolality (285 - 295 MOSM/KG)   264  L
 
  Calcium (8.4 - 10.2 mg/dL)   8.2  L
 
  Magnesium (1.6 - 2.3 mg/dL)   2.0
 
  Total Bilirubin (0.2 - 1.3 mg/dL)   8.4  H
 
  Direct Bilirubin (< 0.4 mg/dL)   6.7  H
 
  AST (14 - 36 U/L)   147  H
 
  ALT (9 - 52 U/L)   52
 
  Alkaline Phosphatase (<127 U/L)   293  H
 
  Troponin I (< 0.11 ng/ml)   < 0.01
 
  Total Protein (6.3 - 8.2 g/dL)   5.9  L
 
  Albumin (3.5 - 5.0 g/dL)   2.6  L
 
  Globulin (1.9 - 4.2 gm/dL)   3.3
 
  Albumin/Globulin Ratio (1.1 - 2.2 %)   0.8  L
 
  Lipase (23 - 300 U/L)   2054  H
 
Coagulation   
 
  PT (9.4 - 12.5 SEC)   15.1  H
 
  INR (0.90 - 1.19)   1.38  H
 
  APTT (25 - 37 SEC)   30
 
Hematology   
 
  CBC w Diff   NO MAN DIFF REQ
 
  WBC (4.8 - 10.8 /CUMM)   15.4  H
 
  RBC (4.20 - 5.40 /CUMM)   1.78  L
 
  Hgb (12.0 - 16.0 G/DL)   6.9 *L
 
  Hct (37 - 47 %)   20.1  L
 
  MCV (81.0 - 99.0 FL)   113.3  H
 
  MCH (27.0 - 31.0 PG)   39.0  H
 
  MCHC (33.0 - 37.0 G/DL)   34.5
 
  RDW (11.5 - 14.5 %)   14.1
 
  Plt Count (130 - 400 /CUMM)   216
 
  MPV (7.4 - 10.4 FL)   7.9
 
  Gran % (42.2 - 75.2 %)   82.7  H
 
  Lymphocytes % (20.5 - 51.1 %)   7.0  L
 
  Monocytes % (1.7 - 9.3 %)   9.7  H
 
  Eosinophils % (0 - 5 %)   0.4
 
  Basophils % (0.0 - 2.0 %)   0.2
 
  Absolute Granulocytes (1.4 - 6.5 /CUMM)   12.7  H
 
  Absolute Lymphocytes (1.2 - 3.4 /CUMM)   1.1  L
 
  Absolute Monocytes (0.10 - 0.60 /CUMM)   1.5  H
 
  Absolute Eosinophils (0.0 - 0.7 /CUMM)   0.1
 
  Absolute Basophils (0.0 - 0.2 /CUMM)   0
 
Toxicology   
 
  Urine Opiates Screen (>2000 NG/ML)  < 100 
 
  Methadone Screen (>300 NG/ML)  < 40 
 
  Barbiturate Screen (>200 NG/ML)  < 60 
 
  Ur Phencyclidine Scrn (>25 NG/ML)  < 6.00 
 
  Amphetamines Screen (>1000 NG/ML)  156 
 
  U Benzodiazepines Scrn (>200 NG/ML)  < 85 
 
  Urine Cocaine Screen (>300 NG/ML)  < 50 
 
  Urine Cannabis Screen (>50 NG/ML)  < 5.00 
 
  Serum Alcohol (<10 MG/DL)   13.0
 
Urines   
 
  Urine Osmolality (300 - 1000 MOSM/KG) 387 Cancelled 
 
  Ur Random Creatinine (mg/dL) 169.0 Cancelled 
 
  Ur Random Sodium (30 - 90 mmol/L) < 5  L Cancelled 
 
  Ur Random Potassium (mmol/L) 48.1 Cancelled 
 
  Fraction Sodium Excret (<1% %)    Cancelled 
 
 
 
 
 04/28
 
 1254
 
Toxicology 
 
  Serum Alcohol Cancelled
 
 
 
 
Assessment/Plan
Assessment/Recommendations:
ASSESSMENT:
1.  Cirrhosis
2.  Ascites
3.  Abnormal transaminases
4.  Ongoing alcohol abuse
5.  Leukocytosis
6.  Anemia, Hemoccult positive stool without gross GI blood loss
7.  Abdominal pain, question spontaneous bacterial peritonitis
8.  Increased lipase
9.  Hyponatremia.  Patient has been  sodium-restricted diet due to ascites
 
 
RECOMMENDATIONS:
1.  Add B12, folate, ferritin, transferrin, iron, TIBC and transferrin 
saturation to patient's existing labs or draw at next blood draw
2.  Transfuse to keep hemoglobin above 7 do not over transfuse
3.  Ceftriaxone 2 g IV every 24 hours for SBP prophylaxis patient has had 
paracentesis in ED would check results of cell count and differential cell count
and differential
4.  Albumin 1.5 g/kg per hour at diagnosis for 3 days and on the third day 1 g/
kg per day
5.  Follow CBC and CMP as well as PT/INR daily
6.  Once patient more stable will need EGD
7.  Strict I's and O's
8.  Check alpha-fetoprotein
9.  Check hepatitis panel
10.  Hold all diuretics
 
 
 
Consult Acknowledgment
- Thank you for your consult request.

## 2018-04-29 VITALS — SYSTOLIC BLOOD PRESSURE: 102 MMHG | DIASTOLIC BLOOD PRESSURE: 53 MMHG

## 2018-04-29 VITALS — SYSTOLIC BLOOD PRESSURE: 100 MMHG | DIASTOLIC BLOOD PRESSURE: 60 MMHG

## 2018-04-29 VITALS — SYSTOLIC BLOOD PRESSURE: 87 MMHG | DIASTOLIC BLOOD PRESSURE: 42 MMHG

## 2018-04-29 VITALS — DIASTOLIC BLOOD PRESSURE: 54 MMHG | SYSTOLIC BLOOD PRESSURE: 93 MMHG

## 2018-04-29 VITALS — DIASTOLIC BLOOD PRESSURE: 41 MMHG | SYSTOLIC BLOOD PRESSURE: 91 MMHG

## 2018-04-29 VITALS — DIASTOLIC BLOOD PRESSURE: 54 MMHG | SYSTOLIC BLOOD PRESSURE: 106 MMHG

## 2018-04-29 LAB
ABSOLUTE GRANULOCYTE CT: 10.5 /CUMM (ref 1.4–6.5)
ABSOLUTE GRANULOCYTE CT: 7.2 /CUMM (ref 1.4–6.5)
ABSOLUTE GRANULOCYTE CT: 8.4 /CUMM (ref 1.4–6.5)
ABSOLUTE GRANULOCYTE CT: 9 /CUMM (ref 1.4–6.5)
ABSOLUTE GRANULOCYTE CT: 9.1 /CUMM (ref 1.4–6.5)
APTT BLD: 35 SEC (ref 25–37)
BASOPHILS # BLD: 0 /CUMM (ref 0–0.2)
BASOPHILS # BLD: 0.1 /CUMM (ref 0–0.2)
BASOPHILS # BLD: 0.1 /CUMM (ref 0–0.2)
BASOPHILS NFR BLD: 0.1 % (ref 0–2)
BASOPHILS NFR BLD: 0.2 % (ref 0–2)
BASOPHILS NFR BLD: 0.4 % (ref 0–2)
BASOPHILS NFR BLD: 0.6 % (ref 0–2)
BASOPHILS NFR BLD: 1.2 % (ref 0–2)
EOSINOPHIL # BLD: 0.1 /CUMM (ref 0–0.7)
EOSINOPHIL NFR BLD: 0.5 % (ref 0–5)
EOSINOPHIL NFR BLD: 0.5 % (ref 0–5)
EOSINOPHIL NFR BLD: 0.8 % (ref 0–5)
ERYTHROCYTE [DISTWIDTH] IN BLOOD BY AUTOMATED COUNT: 13.6 % (ref 11.5–14.5)
ERYTHROCYTE [DISTWIDTH] IN BLOOD BY AUTOMATED COUNT: 14 % (ref 11.5–14.5)
ERYTHROCYTE [DISTWIDTH] IN BLOOD BY AUTOMATED COUNT: 22 % (ref 11.5–14.5)
ERYTHROCYTE [DISTWIDTH] IN BLOOD BY AUTOMATED COUNT: 22.7 % (ref 11.5–14.5)
ERYTHROCYTE [DISTWIDTH] IN BLOOD BY AUTOMATED COUNT: 23.4 % (ref 11.5–14.5)
GRANULOCYTES NFR BLD: 75.4 % (ref 42.2–75.2)
GRANULOCYTES NFR BLD: 75.6 % (ref 42.2–75.2)
GRANULOCYTES NFR BLD: 76.6 % (ref 42.2–75.2)
GRANULOCYTES NFR BLD: 79.1 % (ref 42.2–75.2)
GRANULOCYTES NFR BLD: 79.5 % (ref 42.2–75.2)
HCT VFR BLD CALC: 14.5 % (ref 37–47)
HCT VFR BLD CALC: 19.2 % (ref 37–47)
HCT VFR BLD CALC: 19.2 % (ref 37–47)
HCT VFR BLD CALC: 24.8 % (ref 37–47)
HCT VFR BLD CALC: 26.2 % (ref 37–47)
LYMPHOCYTES # BLD: 1.1 /CUMM (ref 1.2–3.4)
LYMPHOCYTES # BLD: 1.2 /CUMM (ref 1.2–3.4)
MCH RBC QN AUTO: 34.8 PG (ref 27–31)
MCH RBC QN AUTO: 34.9 PG (ref 27–31)
MCH RBC QN AUTO: 35.9 PG (ref 27–31)
MCH RBC QN AUTO: 39.5 PG (ref 27–31)
MCH RBC QN AUTO: 39.9 PG (ref 27–31)
MCHC RBC AUTO-ENTMCNC: 33.8 G/DL (ref 33–37)
MCHC RBC AUTO-ENTMCNC: 34.2 G/DL (ref 33–37)
MCHC RBC AUTO-ENTMCNC: 34.6 G/DL (ref 33–37)
MCHC RBC AUTO-ENTMCNC: 35.3 G/DL (ref 33–37)
MCHC RBC AUTO-ENTMCNC: 35.5 G/DL (ref 33–37)
MCV RBC AUTO: 101.9 FL (ref 81–99)
MCV RBC AUTO: 103.3 FL (ref 81–99)
MCV RBC AUTO: 103.9 FL (ref 81–99)
MCV RBC AUTO: 111.2 FL (ref 81–99)
MCV RBC AUTO: 113.1 FL (ref 81–99)
MONOCYTES # BLD: 1 /CUMM (ref 0.1–0.6)
MONOCYTES # BLD: 1.2 /CUMM (ref 0.1–0.6)
MONOCYTES # BLD: 1.3 /CUMM (ref 0.1–0.6)
MONOCYTES # BLD: 1.4 /CUMM (ref 0.1–0.6)
MONOCYTES # BLD: 1.4 /CUMM (ref 0.1–0.6)
PLATELET # BLD: 139 /CUMM (ref 130–400)
PLATELET # BLD: 144 /CUMM (ref 130–400)
PLATELET # BLD: 152 /CUMM (ref 130–400)
PLATELET # BLD: 153 /CUMM (ref 130–400)
PLATELET # BLD: 187 /CUMM (ref 130–400)
PMV BLD AUTO: 7.2 FL (ref 7.4–10.4)
PMV BLD AUTO: 7.2 FL (ref 7.4–10.4)
PMV BLD AUTO: 7.6 FL (ref 7.4–10.4)
PMV BLD AUTO: 7.7 FL (ref 7.4–10.4)
PMV BLD AUTO: 7.8 FL (ref 7.4–10.4)
PROTHROMBIN TIME: 17.3 SEC (ref 9.4–12.5)
RED BLOOD CELL CT: 1.3 /CUMM (ref 4.2–5.4)
RED BLOOD CELL CT: 1.7 /CUMM (ref 4.2–5.4)
RED BLOOD CELL CT: 1.85 /CUMM (ref 4.2–5.4)
RED BLOOD CELL CT: 2.44 /CUMM (ref 4.2–5.4)
RED BLOOD CELL CT: 2.54 /CUMM (ref 4.2–5.4)
WBC # BLD AUTO: 11.1 /CUMM (ref 4.8–10.8)
WBC # BLD AUTO: 11.5 /CUMM (ref 4.8–10.8)
WBC # BLD AUTO: 11.8 /CUMM (ref 4.8–10.8)
WBC # BLD AUTO: 13.2 /CUMM (ref 4.8–10.8)
WBC # BLD AUTO: 9.6 /CUMM (ref 4.8–10.8)

## 2018-04-29 NOTE — ULTRASOUND REPORT
EXAMINATION:
US TRIPLEX UPPER EXTREMITY, RIGHT
 
CLINICAL INFORMATION:
Swollen right upper extremity. Evaluate for deep vein thrombosis.
 
COMPARISON:
None
 
TECHNIQUE:
Color-flow triplex imaging with spectral analysis and compression Doppler
were performed on the right upper extremity.
 
FINDINGS:
The grayscale and pulsed color Doppler images show normal, widely patent
appearance of the right internal jugular, innominate, subclavian, axial,
brachial, basilic and cephalic veins. No evidence of superficial or deep vein
thrombosis. No focal fluid collection is identified.
 
IMPRESSION:
Normal triplex scan; no evidence of deep venous thrombosis in the right upper
extremity.

## 2018-04-29 NOTE — PN- GASTROENTEROLOGY
Assessment/Plan GI
Assessment/Recommendations:
ASSESSMENT:
1.  Cirrhosis
2.  Ascites, no SBP
3.  Hepatorenal Syndrome
4.  Coagulopathy
5.  Jaundice
6.  Thrombocytopenia
7.  Anemia, Hemoccult-positive stool with no gross luminal blood loss
 
RECOMMENDATIONS:
1.  Continue albumin, octreotide and midrodrine
2.  No signs of hepatic encephalopathy.  Would not start lactulose as this will 
lead to dehydration
3.  Follow BUNs/creatinine
4.  Patient will need EGD to screen for varices however would prefer that she be
more stable with respect to renal function prior to EGD
5.  Continue serial H&H would not transfuse to more than hemoglobin of 8.
 
 
Subjective
Subjective:
Patient is doing well.  No nausea vomiting or abdominal pain.  Patient has had 
no bleeding.  Appreciate nephrology consult regarding hepatorenal syndrome.  
Patient without signs of SBP was only 183 white blood cells on paracentesis.
 
Objective
Vital Signs and I&Os
Vital Signs
 
 
Date Time Temp Pulse Resp B/P B/P Pulse O2 O2 Flow FiO2
 
     Mean Ox Delivery Rate 
 
04/29 0800 99.8 99 17 100/60  95 Room Air  
 
04/29 0600 99.4 97 22 87/42     
 
04/29 0400 99.4 100 16 91/41     
 
04/29 0200 99.3 98 16 93/54     
 
04/29 0000 100.7 104 28 95/53     
 
04/29 0000 100.7 104 28 102/50  97 Room Air Room Air 
 
04/28 2240 99.5 108 16 109/65     
 
04/28 2141 98.1 101 18 112/59  96 Room Air  
 
04/28 2140 98.1 101 18 112/59     
 
04/28 1950 98.0 108 18 106/62  98 Room Air  
 
04/28 1935 98.1 104 18 110/65  99 Room Air  
 
04/28 1847 98.3 104 18 110/61  97 Room Air  
 
04/28 1637 97.9 100 18 106/60  96 Room Air  
 
 
 Intake & Output
 
 
 04/29 1600 04/29 0400 04/28 1600 04/28 0400 04/27 1600 04/27 0400
 
Intake Total 1213  0   
 
Output Total 510  0   
 
Balance 703  0   
 
       
 
Intake, Blood 743     
 
Product      
 
Intake,      
 
Intake, Oral 40  0   
 
Number 0     
 
Bowel      
 
Movements      
 
Output, Urine 510  0   
 
Patient  127 lb 120 lb   
 
Weight      
 
Weight  Bed scale    
 
Measurement      
 
Method      
 
 
 
 
Physical Exam
General Appearance: alert, awake, cachetic
Head: normal appearance
Respiratory: lungs clear
Cardiovascular: regular rate/rhythm, Normal S1 and S2 without Rub, Murmur, or 
Gallop
Abdomen: non-tender, distention
Extremities: no edema
Neurologic/Psychiatric: awake, alert, oriented x 3, no asterixis
Skin: jaundice, diffuse telangiectasia across chest
Current Medications:
 Current Medications
 
 
  Sig/Reagan Start time  Last
 
Medication Dose Route Stop Time Status Admin
 
Albumin Human 25 GM BID 04/29 0900 DC 
 
  IV   
 
Albumin Human 25 GM Q8H 04/29 0756 AC 04/29
 
  IV   0835
 
Albumin Human 25 GM ONCE ONE 04/28 2045 DC 04/29
 
  IV 04/28 2046  0049
 
Albumin Human 50 GM ONCE ONE 04/28 2030 DC 04/29
 
  IV 04/28 2031  0017
 
Albumin Human 50 GM ONCE ONE 04/28 1830 CAN 
 
  IV 04/28 1831  
 
Ceftriaxone Sodium 2,000 MG 1800 04/29 1800 CAN 
 
  IV   
 
Ceftriaxone Sodium 0 .STK-MED ONE 04/28 1921 DC 
 
  .ROUTE   
 
Ceftriaxone Sodium 2,000 MG ONCE ONE 04/28 1815 DC 04/28
 
  IV 04/28 1816  1937
 
Ceftriaxone Sodium 1,000 MG ONCE ONE 04/28 1615 CAN 
 
  IV 04/28 1616  
 
Folic Acid 1 MG DAILY 04/29 0900  04/29
 
  PO   0835
 
Folic Acid 1 MG ONE ONE 04/28 1730 DC 04/29
 
Sodium Chloride 50 ML IV 04/28 1759  0020
 
Folic Acid 1 MG ONCE ONE 04/28 1715 CAN 
 
  IV 04/28 1716  
 
Lorazepam 0 Q1P PRN 04/28 2000 AC 
 
  IV   
 
Midodrine 10 MG 0800,1200,1600 04/29 0800  04/29
 
  PO   1602
 
Nicotine 14 MG DAILY AS NEEDED PRN 04/29 0730  04/29
 
  TOP   0835
 
Octreotide Acetate 100 MCG TID 04/29 0900  04/29
 
  SC   1413
 
Oxycodone HCl 5 MG BID PRN 04/29 0245 AC 
 
  PO   
 
Pantoprazole Sodium 40 MG DAILY 04/29 0900 DC 
 
  IV   
 
Pantoprazole Sodium 40 MG BID 04/29 0900 AC 04/29
 
  IV   0834
 
Pantoprazole Sodium 40 MG ONCE ONE 04/29 0315 DC 04/29
 
  IV 04/29 0316  0608
 
Thiamine HCl 100 MG 1800 04/29 1800 AC 
 
Sodium Chloride 50 ML IV   
 
Thiamine HCl 0 .STK-MED ONE 04/28 1724 DC 
 
  .ROUTE   
 
Thiamine HCl 100 MG ONCE ONE 04/28 1715 DC 04/28
 
Sodium Chloride 50 ML IV 04/28 4293 [id number]
 
 
 
 
Results
Pertinent Lab Results:
 Laboratory Tests
 
 
 04/29 04/29 04/29
 
 1611 1149 UNK
 
Chemistry   
 
  Sodium (137 - 145 mmol/L) Pending 122  L Cancelled
 
  Potassium (3.5 - 5.1 mmol/L)  4.1 Cancelled
 
  Chloride (98 - 107 mmol/L)  84  L Cancelled
 
  Carbon Dioxide (22 - 30 mmol/L)  26 Cancelled
 
  Anion Gap (5 - 16)  12 Cancelled
 
  BUN (7 - 17 mg/dL)  35  H Cancelled
 
  Creatinine (0.5 - 1.0 mg/dL)  1.3  H Cancelled
 
  Estimated GFR (>60 ml/min)  47  L 
 
  Glucose (65 - 99 mg/dL)  92 Cancelled
 
  Calcium (8.4 - 10.2 mg/dL)  8.6 Cancelled
 
  Phosphorus (2.5 - 4.5 mg/dL)  3.5 Cancelled
 
  Magnesium (1.6 - 2.3 mg/dL)  2.0 Cancelled
 
  Total Bilirubin (0.2 - 1.3 mg/dL)  8.8  H Cancelled
 
  AST (14 - 36 U/L)  91  H Cancelled
 
  ALT (9 - 52 U/L)  43 Cancelled
 
  Albumin (3.5 - 5.0 g/dL)  3.3  L Cancelled
 
Hematology   
 
  CBC w Diff  NO MAN DIFF REQ 
 
  WBC (4.8 - 10.8 /CUMM)  11.5  H 
 
  RBC (4.20 - 5.40 /CUMM)  2.44  L 
 
  Hgb (12.0 - 16.0 G/DL)  8.5  L 
 
  Hct (37 - 47 %)  24.8  L 
 
  MCV (81.0 - 99.0 FL)  101.9  H 
 
  MCH (27.0 - 31.0 PG)  34.8  H 
 
  MCHC (33.0 - 37.0 G/DL)  34.2 
 
  RDW (11.5 - 14.5 %)  22.7  H 
 
  Plt Count (130 - 400 /CUMM)  152 
 
  MPV (7.4 - 10.4 FL)  7.6 
 
  Gran % (42.2 - 75.2 %)  79.1  H 
 
  Lymphocytes % (20.5 - 51.1 %)  9.2  L 
 
  Monocytes % (1.7 - 9.3 %)  10.6  H 
 
  Eosinophils % (0 - 5 %)  0.5 
 
  Basophils % (0.0 - 2.0 %)  0.6 
 
  Absolute Granulocytes (1.4 - 6.5 /CUMM)  9.1  H 
 
  Absolute Lymphocytes (1.2 - 3.4 /CUMM)  1.1  L 
 
  Absolute Monocytes (0.10 - 0.60 /CUMM)  1.2  H 
 
  Absolute Eosinophils (0.0 - 0.7 /CUMM)  0.1 
 
  Absolute Basophils (0.0 - 0.2 /CUMM)  0.1 
 
 
 
 
 04/29 04/29 04/29
 
 0615 0615 0500
 
Chemistry   
 
  Sodium (137 - 145 mmol/L)  121  L 
 
  Potassium (3.5 - 5.1 mmol/L)  4.0 
 
  Chloride (98 - 107 mmol/L)  84  L 
 
  Carbon Dioxide (22 - 30 mmol/L)  27 
 
  Anion Gap (5 - 16)  10 
 
  BUN (7 - 17 mg/dL)  36  H 
 
  Creatinine (0.5 - 1.0 mg/dL)  1.3  H 
 
  Estimated GFR (>60 ml/min)  47  L 
 
  Glucose (65 - 99 mg/dL)  103  H 
 
  Calcium (8.4 - 10.2 mg/dL)  8.3  L 
 
  Phosphorus (2.5 - 4.5 mg/dL)  3.5 
 
  Magnesium (1.6 - 2.3 mg/dL)  1.9 
 
  Total Bilirubin (0.2 - 1.3 mg/dL)  6.9  H 
 
  AST (14 - 36 U/L)  90  H 
 
  ALT (9 - 52 U/L)  45 
 
  Albumin (3.5 - 5.0 g/dL)  2.9  L 
 
Coagulation   
 
  PT (9.4 - 12.5 SEC)  17.3  H 
 
  INR (0.90 - 1.19)  1.58  H 
 
  APTT (25 - 37 SEC)  35 
 
Hematology   
 
  CBC w Diff  NO MAN DIFF REQ Cancelled
 
  WBC (4.8 - 10.8 /CUMM)  11.1  H Cancelled
 
  RBC (4.20 - 5.40 /CUMM)  1.85  L Cancelled
 
  Hgb (12.0 - 16.0 G/DL)  6.6 *L Cancelled
 
  Hct (37 - 47 %)  19.2 *L Cancelled
 
  MCV (81.0 - 99.0 FL)  103.9  H Cancelled
 
  MCH (27.0 - 31.0 PG)  35.9  H Cancelled
 
  MCHC (33.0 - 37.0 G/DL)  34.6 Cancelled
 
  RDW (11.5 - 14.5 %)  22.0  H Cancelled
 
  Plt Count (130 - 400 /CUMM)  153 Cancelled
 
  MPV (7.4 - 10.4 FL)  7.2  L Cancelled
 
  Gran % (42.2 - 75.2 %)  75.6  H 
 
  Lymphocytes % (20.5 - 51.1 %)  10.6  L 
 
  Monocytes % (1.7 - 9.3 %)  12.6  H 
 
  Eosinophils % (0 - 5 %)  0.8 
 
  Basophils % (0.0 - 2.0 %)  0.4 
 
  Absolute Granulocytes (1.4 - 6.5 /CUMM)  8.4  H 
 
  Absolute Lymphocytes (1.2 - 3.4 /CUMM)  1.2 
 
  Absolute Monocytes (0.10 - 0.60 /CUMM)  1.4  H 
 
  Absolute Eosinophils (0.0 - 0.7 /CUMM)  0.1 
 
  Absolute Basophils (0.0 - 0.2 /CUMM)  0 
 
  Haptoglobin Pending  
 
 
 
 
 04/29 04/29 04/29
 
 0418 0210 0200
 
Chemistry   
 
  Sodium (137 - 145 mmol/L)  120  L 
 
  Potassium (3.5 - 5.1 mmol/L)  4.0 
 
  Chloride (98 - 107 mmol/L)  82  L 
 
  Carbon Dioxide (22 - 30 mmol/L)  27 
 
  Anion Gap (5 - 16)  11 
 
  BUN (7 - 17 mg/dL)  34  H 
 
  Creatinine (0.5 - 1.0 mg/dL)  1.3  H 
 
  Estimated GFR (>60 ml/min)  47  L 
 
  Glucose (65 - 99 mg/dL)  101  H 
 
  Calcium (8.4 - 10.2 mg/dL)  8.2  L 
 
  Phosphorus (2.5 - 4.5 mg/dL)  3.5 
 
  Magnesium (1.6 - 2.3 mg/dL)  1.9 
 
  Total Bilirubin (0.2 - 1.3 mg/dL)  6.3  H 
 
  AST (14 - 36 U/L)  91  H 
 
  ALT (9 - 52 U/L)  47 
 
  Lactate Dehydrogenase (313 - 618 U/L)  358 
 
  Albumin (3.5 - 5.0 g/dL)  3.2  L 
 
  Cortisol AM Sample (4.46 - 22.7 ug/dL)  16.5 
 
Coagulation   
 
  APTT Cancelled  
 
Hematology   
 
  CBC w Diff  NO MAN DIFF REQ 
 
  WBC (4.8 - 10.8 /CUMM)  9.6 
 
  RBC (4.20 - 5.40 /CUMM)  1.30  L 
 
  Hgb (12.0 - 16.0 G/DL)  5.1 *L 
 
  Hct (37 - 47 %)  14.5 *L 
 
  MCV (81.0 - 99.0 FL)  111.2  H 
 
  MCH (27.0 - 31.0 PG)  39.5  H 
 
  MCHC (33.0 - 37.0 G/DL)  35.5 
 
  RDW (11.5 - 14.5 %)  14.0 
 
  Plt Count (130 - 400 /CUMM)  144 
 
  MPV (7.4 - 10.4 FL)  7.2  L 
 
  Gran % (42.2 - 75.2 %)  75.4  H 
 
  Lymphocytes % (20.5 - 51.1 %)  12.9  L 
 
  Monocytes % (1.7 - 9.3 %)  10.7  H 
 
  Eosinophils % (0 - 5 %)  0.8 
 
  Basophils % (0.0 - 2.0 %)  0.2 
 
  Absolute Granulocytes (1.4 - 6.5 /CUMM)  7.2  H 
 
  Absolute Lymphocytes (1.2 - 3.4 /CUMM)  1.2 
 
  Absolute Monocytes (0.10 - 0.60 /CUMM)  1.0  H 
 
  Absolute Eosinophils (0.0 - 0.7 /CUMM)  0.1 
 
  Absolute Basophils (0.0 - 0.2 /CUMM)  0 
 
  Retic Count (0.5 - 2.0 %)  4.80  H 
 
  Haptoglobin   Cancelled
 
 
 
 
 04/29 04/28 04/28
 
 0000 2330 2207
 
Chemistry   
 
  Ammonia (9 - 30 umol/L)   37  H
 
Hematology   
 
  CBC w Diff Cancelled NO MAN DIFF REQ 
 
  WBC (4.8 - 10.8 /CUMM) Cancelled 13.2  H 
 
  RBC (4.20 - 5.40 /CUMM) Cancelled 1.70  L 
 
  Hgb (12.0 - 16.0 G/DL) Cancelled 6.8 *L 
 
  Hct (37 - 47 %) Cancelled 19.2 *L 
 
  MCV (81.0 - 99.0 FL) Cancelled 113.1  H 
 
  MCH (27.0 - 31.0 PG) Cancelled 39.9  H 
 
  MCHC (33.0 - 37.0 G/DL) Cancelled 35.3 
 
  RDW (11.5 - 14.5 %) Cancelled 13.6 
 
  Plt Count (130 - 400 /CUMM) Cancelled 187 
 
  MPV (7.4 - 10.4 FL) Cancelled 7.7 
 
  Gran % (42.2 - 75.2 %)  79.5  H 
 
  Lymphocytes % (20.5 - 51.1 %)  8.9  L 
 
  Monocytes % (1.7 - 9.3 %)  11.0  H 
 
  Eosinophils % (0 - 5 %)  0.5 
 
  Basophils % (0.0 - 2.0 %)  0.1 
 
  Absolute Granulocytes (1.4 - 6.5 /CUMM)  10.5  H 
 
  Absolute Lymphocytes (1.2 - 3.4 /CUMM)  1.2 
 
  Absolute Monocytes (0.10 - 0.60 /CUMM)  1.4  H 
 
  Absolute Eosinophils (0.0 - 0.7 /CUMM)  0.1 
 
  Absolute Basophils (0.0 - 0.2 /CUMM)  0 
 
 
 
 
 04/28 04/28 04/28
 
 2151 1552 1552
 
Chemistry   
 
  Sodium (137 - 145 mmol/L) 119 *L  
 
  Potassium (3.5 - 5.1 mmol/L) 4.3  
 
  Chloride (98 - 107 mmol/L) 80  L  
 
  Carbon Dioxide (22 - 30 mmol/L) 28  
 
  Anion Gap (5 - 16) 11  
 
  BUN (7 - 17 mg/dL) 37  H  
 
  Creatinine (0.5 - 1.0 mg/dL) 1.2  H  
 
  Estimated GFR (>60 ml/min) 51  L  
 
  BUN/Creatinine Ratio (7 - 25 %) 30.8  H  
 
  Iron (37 - 170 ug/dL) 39  
 
  TIBC (265 - 497 ug/dL) 185  L  
 
  Ferritin (6.24 - 137 ng/mL) 1120.0  H  
 
  TSH &T3 &Free T4 Intrp (0.270 - 4.20 uIU/mL) 2.060  
 
Hematology   
 
  Lymphocytes (%)   8
 
  % Normal PMNs (%)   14
 
  Misc Hematology Test (%)     
 
Other Body Source   
 
  Fluid WBC (0 - 5 /CUMM)  138  H 
 
  Fld Total RBCs Counted (0 /CUMM)  88  H 
 
  Fluid Total Protein (g/dL)   < 2.0
 
  Fluid Albumin (g/dL)   < 1.0
 
  Fluid Lipase (U/L)   225
 
 
 
 
 04/28 04/28 04/28
 
 1417 1417 1417
 
Toxicology   
 
  Urine Opiates Screen (>2000 NG/ML)   < 100
 
  Methadone Screen (>300 NG/ML)   < 40
 
  Barbiturate Screen (>200 NG/ML)   < 60
 
  Ur Phencyclidine Scrn (>25 NG/ML)   < 6.00
 
  Amphetamines Screen (>1000 NG/ML)   156
 
  U Benzodiazepines Scrn (>200 NG/ML)   < 85
 
  Urine Cocaine Screen (>300 NG/ML)   < 50
 
  Urine Cannabis Screen (>50 NG/ML)   < 5.00
 
Urines   
 
  Urinalysis MOD  H  
 
  Urine Color (YEL,AMB,STR) MAR[place]UY  
 
  Urine Clarity (CLEAR) HAZY  H  
 
  Urine pH (5.0 - 8.0) 5.5  
 
  Ur Specific Gravity (1.001 - 1.035) 1.025  
 
  Urine Protein (NEG,<30 MG/DL) NEG  
 
  Urine Ketones (NEG) NEG  
 
  Urine Nitrite (NEG) POS  H  
 
  Urine Bilirubin (NEG) POS@ICTO  H  
 
  Urine Urobilinogen (0.1  -  1.0 EU/dl) 2.0  H  
 
  Ur Leukocyte Esterase (NEG) NEG  
 
  Ur Microscopic SEDIMENT EXAMINED  
 
  Urine RBC (0 - 5 /HPF) 1-3  
 
  Ur Epithelial Cells (NONE,FEW) MOD  H  
 
  Urine Bacteria (NEG/NONE) FEW  H  
 
  Hyaline Casts (0/LPF) RARE  H  
 
  Urine Hemoglobin (NEG) NEG  
 
  Urine Osmolality (300 - 1000 MOSM/KG)  387 Cancelled
 
  Ur Random Creatinine (mg/dL)  169.0 Cancelled
 
  Ur Random Sodium (30 - 90 mmol/L)  < 5  L Cancelled
 
  Ur Random Potassium (mmol/L)  48.1 Cancelled
 
  Fraction Sodium Excret (<1% %)     Cancelled
 
  Urine Glucose (N MG/DL) NEG  
 
  Urine Pregnancy Test NEGATIVE  
 
 
 
 
 04/28 04/28 04/28
 
 1325 1325 1254
 
Chemistry   
 
  Sodium (137 - 145 mmol/L)  118 *L 
 
  Potassium (3.5 - 5.1 mmol/L)  4.2 
 
  Chloride (98 - 107 mmol/L)  79  L 
 
  Carbon Dioxide (22 - 30 mmol/L)  29 
 
  Anion Gap (5 - 16)  10 
 
  BUN (7 - 17 mg/dL)  36  H 
 
  Creatinine (0.5 - 1.0 mg/dL)  1.2  H 
 
  Estimated GFR (>60 ml/min)  51  L 
 
  BUN/Creatinine Ratio (7 - 25 %)  30.0  H 
 
  Glucose (65 - 99 mg/dL)  115  H 
 
  Serum Osmolality (285 - 295 MOSM/KG)  264  L 
 
  Calcium (8.4 - 10.2 mg/dL)  8.2  L 
 
  Magnesium (1.6 - 2.3 mg/dL)  2.0 
 
  TIBC (265 - 497 ug/dL)  187  L 
 
  Ferritin (6.24 - 137 ng/mL)  1080.0  H 
 
  Total Bilirubin (0.2 - 1.3 mg/dL)  8.4  H 
 
  Direct Bilirubin (< 0.4 mg/dL)  6.7  H 
 
  AST (14 - 36 U/L)  147  H 
 
  ALT (9 - 52 U/L)  52 
 
  Alkaline Phosphatase (<127 U/L)  293  H 
 
  Lactate Dehydrogenase (313 - 618 U/L)  633  H 
 
  Troponin I (< 0.11 ng/ml)  < 0.01 
 
  Total Protein (6.3 - 8.2 g/dL)  5.9  L 
 
  Albumin (3.5 - 5.0 g/dL)  2.6  L 
 
  Globulin (1.9 - 4.2 gm/dL)  3.3 
 
  Albumin/Globulin Ratio (1.1 - 2.2 %)  0.8  L 
 
  Lipase (23 - 300 U/L)  2054  H 
 
  Alpha Fetoprotein Pending  
 
  Vitamin B12 (239 - 931 pg/mL)  > 1000  H 
 
  Folate (2.76 - 20.0 ng/mL)  9.0 
 
Coagulation   
 
  PT (9.4 - 12.5 SEC)  15.1  H 
 
  INR (0.90 - 1.19)  1.38  H 
 
  APTT (25 - 37 SEC)  30 
 
Hematology   
 
  CBC w Diff  NO MAN DIFF REQ 
 
  WBC (4.8 - 10.8 /CUMM)  15.4  H 
 
  RBC (4.20 - 5.40 /CUMM)  1.78  L 
 
  Hgb (12.0 - 16.0 G/DL)  6.9 *L 
 
  Hct (37 - 47 %)  20.1  L 
 
  MCV (81.0 - 99.0 FL)  113.3  H 
 
  MCH (27.0 - 31.0 PG)  39.0  H 
 
  MCHC (33.0 - 37.0 G/DL)  34.5 
 
  RDW (11.5 - 14.5 %)  14.1 
 
  Plt Count (130 - 400 /CUMM)  216 
 
  MPV (7.4 - 10.4 FL)  7.9 
 
  Gran % (42.2 - 75.2 %)  82.7  H 
 
  Lymphocytes % (20.5 - 51.1 %)  7.0  L 
 
  Monocytes % (1.7 - 9.3 %)  9.7  H 
 
  Eosinophils % (0 - 5 %)  0.4 
 
  Basophils % (0.0 - 2.0 %)  0.2 
 
  Absolute Granulocytes (1.4 - 6.5 /CUMM)  12.7  H 
 
  Absolute Lymphocytes (1.2 - 3.4 /CUMM)  1.1  L 
 
  Absolute Monocytes (0.10 - 0.60 /CUMM)  1.5  H 
 
  Absolute Eosinophils (0.0 - 0.7 /CUMM)  0.1 
 
  Absolute Basophils (0.0 - 0.2 /CUMM)  0 
 
Serology   
 
  Hepatitis A IgM Ab (NONREACTIVE)  NONREACTIVE 
 
  Hep Bs Antigen (NONREACTIVE)  NONREACTIVE 
 
  Hep B Core IgM Ab Conf (NONREACTIVE)  NONREACTIVE 
 
  Hepatitis C Antibody (NONREACTIVE)  NONREACTIVE 
 
  HIV 1&2 Ab Western Blot (NONREACTIVE)  NONREACTIVE 
 
Toxicology   
 
  Salicylates (0 - 20.0 mg/dL)  2.8 
 
  Acetaminophen (10.0 - 30.0 ug/mL)  < 10.0  L 
 
  Serum Alcohol (<10 MG/DL)  13.0 Cancelled

## 2018-04-29 NOTE — PN- RESIDENT CRCU
Eliezer Nieves 18 0841:
Subjective
HPI/CRCU Issues:
#1 acute hyponatremia, hypervolemic hypoosmolar hyponatremia
#2 decompensated cirrhosis, likely alcohol related
#3 moderate ascites
#4 leukocytosis
#5 acute pancreatitis
#6 anemia, likely ABLA
#7 alcohol abuse
#8 macrocytosis
#9 BRETT likey pre-renal
#10 r/o HRS type 1
#11 Asterexis, likely HE
 
24 Hour Events:
Patient was seen and examined this morning. she is alert awake and oriented to 
time place and person
No acute events noticed overnight
She reports mild abdominal discomfort from distention
Denies any fever, chills, severe abdominal pain
No constipation or diarrhea
She reports mild shortness of breath
Denies any lightheadedness or dizziness
 
Patient has black colored ecchymosis, swelling, on her right arm after IV 
infiltration of fluids/blood in the emergency room.  Borders were marked.  
Doppler ultrasound ruled out acute clot.
 
 
Vitals afebrile, heart rate 90, respiratory rate 17, blood pressure 100/60, 
saturating at 95 on room air
Patient received 3 units of blood transfusion so far, hemoglobin improved 8.5 
and hematocrit 24, leukocytosis 11.1, platelets 153.  
Sodium 121
Creatinine 1.3
 
 
 
Objective
 
Vital Signs & I&O
Last 8 Hrs of Vitals and I&O:
 Intake & Output
 
 
  1600  0800  0000
 
Intake Total 570 643 
 
Output Total 500 10 
 
Balance 70 633 
 
    
 
Intake, Blood 400 343 
 
Product   
 
Intake,  300 
 
Intake, Oral 40 0 
 
Number 0  
 
Bowel   
 
Movements   
 
Output, Urine 500 10 
 
Patient   57.6 kg
 
Weight   
 
Weight   Bed scale
 
Measurement   
 
Method   
 
 
 Intake & Output
 
 
  1600
 
Intake Total 570
 
Output Total 500
 
Balance 70
 
  
 
Intake, Blood 400
 
Product 
 
Intake, 
 
Intake, Oral 40
 
Number 0
 
Bowel 
 
Movements 
 
Output, Urine 500
 
 
 
 
Exam
General Appearance: well developed/nourished, no apparent distress, alert, awake
Other Physical Findings:
General Appearance Alert, Oriented X3, Cooperative, No Acute Distress
Skin No Breakdown, spider telangiectasias, increased vascular markings, but not 
caput medusae
HEENT Atraumatic, PERRLA, EOMI, Mucous Membr. moist/pink
Neck Supple, No JVD, No thryomegaly
Lymphatic Cervical nl
Cardiovascular Regular Rate, Normal S1, Normal S2, No Murmurs
Lungs Clear to Auscultation, Normal Air Movement
Abdomen Normal Bowel Sounds, abdominal distention fluid thrill + shifting 
dullness +
Neurological Normal Speech, Strength at 5/5 X4 Ext, Normal Tone, Sensation 
Intact, Cranial Nerves 3-12 NL, Reflexes 2+, cerebellar tests normal
Extremities No Clubbing, No Cyanosis, No Edema, Asterexis +
Vascular Pulses Symmetrical
Current Medications:
 Current Medications
 
 
  Sig/Reagan Start time  Last
 
Medication Dose Route Stop Time Status Admin
 
Albumin Human 25 GM BID  0900 DC 
 
  IV   
 
Albumin Human 25 GM Q8H  0756 AC 
 
  IV   0835
 
Albumin Human 25 GM ONCE ONE  2045 DC 
 
  IV 2046  0049
 
Albumin Human 50 GM ONCE ONE  2030 DC 
 
  IV 2031  0017
 
Albumin Human 50 GM ONCE ONE  1830 CAN 
 
  IV  183  
 
Ceftriaxone Sodium 2,000 MG 1800  1800 CAN 
 
  IV   
 
Ceftriaxone Sodium 0 .STK-MED ONE  1921 DC 
 
  .ROUTE   
 
Ceftriaxone Sodium 2,000 MG ONCE ONE  1815 DC 
 
  IV  181  1937
 
Ceftriaxone Sodium 1,000 MG ONCE ONE  1615 CAN 
 
  IV  1616  
 
Folic Acid 1 MG DAILY  0900  
 
  PO   0835
 
Folic Acid 1 MG ONE ONE  1730 DC 
 
Sodium Chloride 50 ML IV  1759  0020
 
Folic Acid 1 MG ONCE ONE  1715 CAN 
 
  IV  1716  
 
Lorazepam 0 Q1P PRN  2000 AC 
 
  IV   
 
Midodrine 10 MG 0800,1200,1600  0800  
 
  PO   1602
 
Nicotine 14 MG DAILY AS NEEDED PRN  0730  
 
  TOP   0835
 
Octreotide Acetate 100 MCG TID  0900  
 
  SC   1413
 
Oxycodone HCl 5 MG BID PRN  0245 AC 
 
  PO   
 
Pantoprazole Sodium 40 MG DAILY  0900 DC 
 
  IV   
 
Pantoprazole Sodium 40 MG BID  0900 AC 
 
  IV   0834
 
Pantoprazole Sodium 40 MG ONCE ONE  0315 DC 
 
  IV  0316  0608
 
Thiamine HCl 100 MG 1800  1800 AC 
 
Sodium Chloride 50 ML IV   
 
Thiamine HCl 0 .STK-MED ONE  1724 DC 
 
  .ROUTE   
 
Thiamine HCl 100 MG ONCE ONE  1715 DC 
 
Sodium Chloride 50 ML IV  0245 7157
 
 
 
 
Impression/Plan
 
Impression/Problem List
Impression:
Ms Barros is  a 32-year-old woman with a past medical history of alcohol abuse 
came to the hospital with evaluation of acute hyponatremia likely secondary to 
decompensated cirrhosis.
 
At the time of admission-temperature 96.6, pulse rate 111, respiration 18, blood
pressure 109/64, pulse ox 99% on room air.  She was found to have stool 
Hemoccult positve.  He underwent diagnostic paracentesis while in the emergency 
room. 
 
Pertinent labs at the time of admission-
 
WBC 15.4, hemoglobin 6.9 (12.1 on 03/15/2018), HCT 20.1,  (macrocytosis),
platelet count 216.  Sodium 118, potassium 4.2, chloride 79, BUN 36, creatinine 
1.2 (Baseline around 0.3 on 03/15/2018).  Serum osmolality 264 ( hypoosmolar ), 
urine sodium less than 5, FeNa not calculated  Total bilirubin 8.4, direct 
bilirubin 6.7 (elevated), , AST 52 ( AST/ALT 2:1 consistent w/ alcohol 
use), alkaline phosphatase 293, lipase 2054.  INR 1.38.  Albumin 2.6.
 
Paracentesis fluid .  Fluid albumin 1.0.  SAAG 1.6 and ascitic fluid < 
2.5 ( consistent w/ portal hypertension).  Discriminant function of 24.  Yoli 
C, with one-year estimated mortality of approximately 55%.
 
U tox negative.  Serum alcohol 13.0.  Urinalysis revealed urine nitrate positive
, leukocyte esterase negative. 
 
US abdomen:  Liver of coarse and echogenicity without focal lesions. There is 
subcapsular nodularity. The appearance is nonspecific, but could be 
representative of cirrhosis. No hepatic mass lesions are demonstrable.  There is
echogenic bile without cholelithiasis. There is gallbladder wall thickening, 
likely accentuated due to adjacent ascites. Moderate amount of ascites.
 
#1 acute hyponatremia, hypervolemic hypoosmolar hyponatremia
#2 decompensated cirrhosis, likely alcohol related
#3 moderate ascites
#4 leukocytosis
#5 acute pancreatitis
#6 anemia, likely ABLA
#7 alcohol abuse
#8 macrocytosis
#9 BRETT likey pre-renal
#10 r/o HRS type 1
#11 Asterexis, likely HE
 
--------------------------------------------------------------------------------
--------------
 
 
Respiratory: 
Currently stable at this time.
chest x-ray ruled out any consolidation, or fluid overload.
 
 
Infectious:
Leukocytosis, with unclear etiology.  Also has granulocytosis indicating an 
acute infection.  Since she has leukocytosis 138 with 14% polys on her 
paracentesis, not meeting subacute bacterial criteria; given heme positive 
stools she was treated with ceftriaxone 2 g daily.  Checked urinalysis and urine
culture.  
* No source of infection so far
* Body fluid ascites -culture negative for any organisms
* No pneumonia
* No urine infection
* Ceftriaxone was discontinued today as per Gastro recommendations
* Blood cultures, urine cultures negative so far
 
 
 
 
circulatory
Blood pressure remains borderline.  Given her history of cirrhosis, and elevated
serum creatinine would keep hepatorenal syndrome in the differential.  
* Hold Lasix, spironolactone at this time.  
* Continue midodrine 10 mg every 8 hours. 
* continue octreotide 100 mg sc q8
 
 
 
Hematology: 
Symptomatic anemia, acute blood loss anemia with heme-positive stool
She has acute drop of hemoglobin, likely from ABLA.  She received 3 units of 
PRBC with a goal of hemoglobin of 8, given possible SBP.  Continued to monitor H
&H closely.  Transfusion of colloid, including albumin will likely increase 
intravascular volume.  Monitor for any bleeding complications.  Don't over 
zealously transfuse, since we do not know if the patient has any esophageal or 
rectal varices.
* Hemoglobin trend 6.9, 6.8, 5.1, 6.6, 8.5
* Status post 3 unit blood transfusion
* Monitor H&H closely
* Continue albumin to increase intravascular volume
* Goal hemoglobin above 8
* Continue to monitor INR given coagulopathy
 
 
 
Metabolic: hyponatremia
Hypervolemic hypoosmolar hyponatremia could likely be due to cirrhosis, and 
decreased intravascular volume.  
* Checking BEP every 4 hours, with a goal correction of not more than 8 mEq in 
the next 24 hours.  
* If the BEP is overly corrected, would use D5 water. 
* Fluid restriction at this time of not more than 500 mL per day as per renal.  
* Sodium trend 118, 119, 120, 121, 122 in 24 hours
 
 
 
 
BRETT:
major differential between prerenal due to low IV volume vs hepatorenal syndrome
; ATN less likely.  Renal ultrasound ruled out obstruction. Serum creatinines 
elevated, which would likely be prerenal/hepatorenal syndrome given elevated 
creatinine 1.3 from 0.3 in a month.
* continue volume expansion w colloid (SP Alb & prbc) 
* No renal replacement indication.
* Albumin 1.5 g per KG body weight as per the gastroenterologist, as per SBP 
albumin+Ceftriaxone protocol.  
* Continue albumin 25 mg twice a day, as per renal.
* Continue midodrine
* Continue octreotide
 
 
 
Alimentary:
reg diet
No plans for urgent EGD
 
 
 
Neurology: 
Stable at this time.  Pain management as required with oxycodone. Avoid morphine
as it worsens pancreatitis. In regards to alcohol use, would monitor CIWA; and 
lorazepam as per CIWA. No scheduled dosing at this time. If she stars to require
any lorazepam, she would need scheduled dosing. 
 
 
 
DVT PPx- Alps at this time, given heme positive stools, and anemia. Once resolve
, she should be on heparin given abnormal liver function. 
GI PPx- Protonix iv 
HE PPx- None at this time. Would consider Rifaxamin, given her h/o BRETT and 
inability to use lactulose at this  time. Although Rifaxamin is not clearly 
indicated in her first decompensation. 
 
Last EGD date: none. 
Last Screening US: 18
Hepatitis vaccination: date unknown. consider vaccination
Transplant evaluation: unknown, and would need sobriety for almost an year. 
 
 
 
#1 Central line- none 
#2 Arterial line- none( removed )
#3 Joyce catheter ( in place ) 
#4 Rectal tube ( none ) 
#5 NG tube ( none ) 
#6 IV/peripheral line- in place. 
#7 IV drips ( none ) 
#8 Vent settings: ( none ) 
#9 pressors ( none )
 
Problem List:
 1. Alcoholic hepatitis
 
 2. Ascites
 
 3. Alcohol abuse
 
 4. Pancreatitis
 
 5. Ascites due to alcoholic cirrhosis
 
 6. Anemia
 
Pain Ratin
Tomorrow's Labs & Rationales:
cbc
icu bundle
 
Plan
DVT/Prophylaxis: Carlos Frances MD 18 1015:
Attending MD Review Statement
 
Attending Sign Off
Attending Cosign Statement:
I have: examined this patient, reviewed [facility] EMR data, personally reviewd 
images, discussd w/resident/PA/NP, discussed mgmt plan w/benitez, discussed mgmt 
plan w/CM, discussed mgmt plan w/pt, agreed w/resident/PA/NP, amended to note. 
Other Findings:
Impression
32 year old woman
 
* etoh dependence, recently cutting down
* hyponatremia - hypervolemic, hypoosmolar in the setting of liver cirrhosis - 
chronic
* BRETT - prerenal vs hepatorenal, less likely ATN
* coagulopathy likely secondary to liver disease
* anemia
 
Plan
Respiratory
-cxr normal, room air
 
ID
-mild leukocytosis 11.1
-no evidence of SBP, no fever at this time
-per GI - ceftriaxone - in setting of cirrhosis and anemia
 
[facility]
-monitor hemodynamics
 
Heme
-GI consultation is appreciated
-full anemia workup - macrocytic, liver disease and etoh etiology
 
Metabolic
-nephrology consultation
-ins/outs
-monitor electrolytes
-octreotide, albumin, midodrine
-renal usg
-fluid restriction 500cc/hr
-elevated ammonia inquire with GI regarding any lactulose vs rifaxamin
-q4 labs to monitor sodium
 
Alimentary
-fluid restriction as above
 
Neuro
-no acute issues
-elevated ammonia, see above
 
DVT prophylaxis at all times - ALPS
 
TTS 40 min

## 2018-04-29 NOTE — ADMISSION CERTIFICATION
Admission Certification
 
Certification Statement
- As attending physician, I certify that at the time of
- admission, based on clinical presentation, severity of
- symptoms, need for further diagnostic testing and
- therapeutic interventions, and risk of adverse outcomes
- without in-hospital treatment, in my clinical assessment,
- this patient requires an acute hospital stay for a minimum
- of two nights or longer. I have also considered psychsocial
- factors such as support system, advanced age, financial
- issues, cognitive issues, and failed out-patient treatments,
- past re-admission history, safety of patient, and lack of
- compliance as applicable.
Specific rationale supporting this admission is:
Alcoholic cirrhosis with ascites, alcoholic hepatitis, acute hyponatremia, acute
blood loss anemia likely upper GI bleed, needs ICU level of care.

## 2018-04-29 NOTE — ULTRASOUND REPORT
EXAMINATION:
US RETROPERITONEAL COMPLETE (RENAL)
 
CLINICAL INFORMATION:
Acute renal insufficiency..
 
COMPARISON:
Limited abdominal ultrasound from 04/28/2018. Abdominal ultrasound from
03/15/2018.
 
TECHNIQUE:
Real-time imaging of the kidneys and bladder.
 
FINDINGS:
 
RIGHT KIDNEY: 10.9 x 4.8 x 5.6 cm (SAG x AP x TRV). The kidney has normal
size, cortical thickness and cortical echotexture. No nephrolithiasis or
hydronephrosis.
 
LEFT KIDNEY: 11.1 x 4.9 x 4.7 cm (SAG x AP x TRV). The kidney has normal
size, cortical thickness and cortical echotexture. No nephrolithiasis or
hydronephrosis.
 
BLADDER: Moderate amount of ascites is present within the abdomen and pelvis.
The ultrasound technologist reports difficulty identifying the urinary
bladder in the presence of the ascites.
 
IMPRESSION:
No acute sonographic findings in either kidney compared to 03/15/2018. The
kidneys have normal echotexture. No evidence of nephrolithiasis or
hydronephrosis.
 
Moderate ascites is present..

## 2018-04-29 NOTE — CONS- NEPHROLOGY
General Information and HPI
 
Consulting Request
Date of Consult: 04/29/18
Requested By:
Dimas Skelton MD
 
Reason for Consult:
BRETT & hyponatremia
Source of Information: patient, old records
Exam Limitations: no limitations
History of Present Illness:
32 yr old WF w known EtOHism complicated by presumed cirrhosis w portal 
hypertension & ascites admit yesterdy after outpt labs showed worsening 
hyponatremia 117 in setting of Lasix & spironolactone. Found profoundly anemic 
to Hg 5.1 w BRETT --> BUN/Cr rise from 3/0.3 last month to 32/1.1. Orthostatic sx 
but initially no hypotension. Admits to drinking large amount water (1 gallon/
day) at home. Transfused prbc & started on SP Alb after receiving IV NS in ED. 
No IV contrast, RAAS inhibition, or NSAIDs. Baseline hyponatremia low 130s last 
month & no neuro sx or szs. Underwent diagnostic paracentesis & started on IV 
cerftriaxone in setting ow grade temp & leukocytosis.
 
Allergies/Medications
Allergies:
Coded Allergies:
No Known Allergies (03/15/18)
 
Home Med List:
Calcium Carb/Magnesium Hydrox (Rolaids Chewable Tablet) (Unknown Strength) 
TAB.CHEW   (Unknown Dose) PO DAILY SUPPLEMENT  (Reported)
Furosemide 20 MG TABLET   1 TAB PO DAILY DIURETIC  (Reported)
Lactulose 10 GRAM/15 ML SOLUTION   15 ML PO DAILY PRN CONSTIPATION  (Reported)
Riboflavin (Vitamin B-2) 25 MG TABLET   1 TAB PO DAILY SUPPLEMENT  (Reported)
Spironolactone 50 MG TABLET   1 TAB PO DAILY DIURETIC  (Reported)
 
Current Medications:
 Current Medications
 
 
  Sig/Reagan Start time  Last
 
Medication Dose Route Stop Time Status Admin
 
Albumin Human 25 GM BID 04/29 0900 AC 
 
  IV   
 
Albumin Human 25 GM ONCE ONE 04/28 2045 DC 04/29
 
  IV 04/28 2046  0049
 
Albumin Human 50 GM ONCE ONE 04/28 2030 DC 04/29
 
  IV 04/28 2031  0017
 
Albumin Human 50 GM ONCE ONE 04/28 1830 CAN 
 
  IV 04/28 1831  
 
Ceftriaxone Sodium 2,000 MG 1800 04/29 1800 AC 
 
  IV   
 
Ceftriaxone Sodium 0 .STK-MED ONE 04/28 1921 DC 
 
  .ROUTE   
 
Ceftriaxone Sodium 2,000 MG ONCE ONE 04/28 1815 DC 04/28
 
  IV 04/28 1816  1937
 
Ceftriaxone Sodium 1,000 MG ONCE ONE 04/28 1615 CAN 
 
  IV 04/28 1616  
 
Folic Acid 1 MG DAILY 04/29 0900 AC 
 
  PO   
 
Folic Acid 1 MG ONE ONE 04/28 1730 DC 04/29
 
Sodium Chloride 50 ML IV 04/28 1759  0020
 
Folic Acid 1 MG ONCE ONE 04/28 1715 CAN 
 
  IV 04/28 1716  
 
Lidocaine 20 ML ONCE ONE 04/28 1530 DC 04/28
 
  ID 04/28 1531  1530
 
Lorazepam 0 Q1P PRN 04/28 2000 AC 
 
  IV   
 
Midodrine 10 MG 0800,1200,1600 04/29 0800 UNVr 
 
  PO   
 
Oxycodone HCl 5 MG BID PRN 04/29 0245 AC 
 
  PO   
 
Pantoprazole Sodium 40 MG DAILY 04/29 0900 DC 
 
  IV   
 
Pantoprazole Sodium 40 MG BID 04/29 0900 UNVr 
 
  IV   
 
Pantoprazole Sodium 40 MG ONCE ONE 04/29 0315 DC 04/29
 
  IV 04/29 0316  0608
 
Sodium Chloride 1,000 ML BOLUS ONE 04/28 1300 DC 04/28
 
  IV 04/28 1359  1638
 
Thiamine HCl 100 MG 1800 04/29 1800 AC 
 
Sodium Chloride 50 ML IV   
 
Thiamine HCl 0 .STK-MED ONE 04/28 1724 DC 
 
  .ROUTE   
 
Thiamine HCl 100 MG ONCE ONE 04/28 1715 DC 04/28
 
Sodium Chloride 50 ML IV 04/28 1814  2251
 
 
 
 
Review of Systems
 
Review of Systems
Constitutional:
Reports: weakness. 
EENTM:
Reports: no symptoms. 
Cardiovascular:
Reports: no symptoms. 
Respiratory:
Reports: no symptoms. 
GI:
Reports: distention. 
Genitourinary:
Reports: no symptoms. 
Musculoskeletal:
Reports: no symptoms. 
Skin:
Reports: no symptoms. 
Neurological/Psychological:
Reports: no symptoms. 
Hematologic/Endocrine:
Reports: bruising, bleeding. 
Immunologic/Allergic:
Reports: no symptoms. 
All Other Systems: Reviewed and Negative
 
Past History
 
Travel History
Traveled to [place] past 21 day No
 
Medical History
Blood Transfusion Hx: Yes
Neurological: NONE
EENT: NONE
Cardiovascular: NONE
Respiratory: NONE
Gastrointestinal: NONE
Hepatic: ?LIVER FAILURE
Renal: NONE
Musculoskeletal: NONE
Psychiatric: alcohol dependence
Endocrine: NONE
Blood Disorders: NONE
Cancer(s): NONE
GYN/Reproductive: NONE
 
Surgical History
Surgical History: non-contributory
 
Family History
Relations & Conditions If Any:
FATHER (CAD).
 
 
Psychosocial History
Where Do You Live? Home
Services at Home: None
Smoking Status: Current Everyday Smoker
 
Functional Ability
ADLs
Needs Assist: dressing, eating. 
Ambulation: independent
IADLs
Independent: shopping, housework, telephone.  Unknown: finances, food prep. 
 
Employment History
Employment: Employed (care taker)
 
Exam & Diagnostic Data
Vital Signs and I&O
Vital Signs
 
 
Date Time Temp Pulse Resp B/P B/P Pulse O2 O2 Flow FiO2
 
     Mean Ox Delivery Rate 
 
04/29 0600 99.4 97 22 87/42     
 
04/29 0400 99.4 100 16 91/41     
 
04/29 0200 99.3 98 16 93/54     
 
04/29 0000 100.7 104 28 95/53     
 
04/29 0000 100.7 104 28 102/50  97 Room Air Room Air 
 
04/28 2240 99.5 108 16 109/65     
 
04/28 2141 98.1 101 18 112/59  96 Room Air  
 
04/28 2140 98.1 101 18 112/59     
 
04/28 1950 98.0 108 18 106/62  98 Room Air  
 
04/28 1935 98.1 104 18 110/65  99 Room Air  
 
04/28 1847 98.3 104 18 110/61  97 Room Air  
 
04/28 1637 97.9 100 18 106/60  96 Room Air  
 
04/28 1242 96.6 111 18 109/64  99 Room Air  
 
 
 Intake & Output
 
 
 04/29 1600 04/29 0400 04/28 1600 04/28 0400 04/27 1600 04/27 0400
 
Intake Total 643  0   
 
Output Total 10  0   
 
Balance 633  0   
 
       
 
Intake, Blood 343     
 
Product      
 
Intake,      
 
Intake, Oral 0  0   
 
Output, Urine 10  0   
 
Patient  127 lb 120 lb   
 
Weight      
 
Weight  Bed scale    
 
Measurement      
 
Method      
 
 
 
 
Physical Exam
General Appearance: well developed/nourished, no apparent distress, alert, awake
Head: atraumatic, normal appearance
Eyes:
Bilateral: other (grossly icteric). 
Ears, Nose, Throat: normal ENT inspection
Neck: normal inspection, supple
Respiratory: normal breath sounds, no respiratory distress, quiet respiration, 
lungs clear
Cardiovascular: regular rate/rhythm, edema (none)
Gastrointestinal: non-tender, distention, hepatomegaly
Extremities: no edema
Neurologic/Psych: no motor/sensory deficits, awake, alert, oriented x 3, CNs II-
XII nml as tested, no asterixis
Skin: jaundice, large hematoma R arm; spiders on chest
Lymphatic: no anterior cervical isabel, no axil adenopathy
 
Results
Pertinent Lab Results:
 Laboratory Tests
 
 
 04/29 04/29 04/29
 
 0615 0615 0500
 
Chemistry   
 
  Sodium (137 - 145 mmol/L)  121  L 
 
  Potassium (3.5 - 5.1 mmol/L)  4.0 
 
  Chloride (98 - 107 mmol/L)  84  L 
 
  Carbon Dioxide (22 - 30 mmol/L)  27 
 
  Anion Gap (5 - 16)  10 
 
  BUN (7 - 17 mg/dL)  36  H 
 
  Creatinine (0.5 - 1.0 mg/dL)  1.3  H 
 
  Estimated GFR (>60 ml/min)  47  L 
 
  Glucose (65 - 99 mg/dL)  103  H 
 
  Calcium (8.4 - 10.2 mg/dL)  8.3  L 
 
  Phosphorus (2.5 - 4.5 mg/dL)  3.5 
 
  Magnesium (1.6 - 2.3 mg/dL)  1.9 
 
  Total Bilirubin (0.2 - 1.3 mg/dL)  6.9  H 
 
  AST (14 - 36 U/L)  90  H 
 
  ALT (9 - 52 U/L)  45 
 
  Albumin (3.5 - 5.0 g/dL)  2.9  L 
 
Coagulation   
 
  PT (9.4 - 12.5 SEC)  17.3  H 
 
  INR (0.90 - 1.19)  1.58  H 
 
  APTT (25 - 37 SEC)  35 
 
Hematology   
 
  CBC w Diff  NO MAN DIFF REQ Cancelled
 
  WBC (4.8 - 10.8 /CUMM)  11.1  H Cancelled
 
  RBC (4.20 - 5.40 /CUMM)  1.85  L Cancelled
 
  Hgb (12.0 - 16.0 G/DL)  6.6 *L Cancelled
 
  Hct (37 - 47 %)  19.2 *L Cancelled
 
  MCV (81.0 - 99.0 FL)  103.9  H Cancelled
 
  MCH (27.0 - 31.0 PG)  35.9  H Cancelled
 
  MCHC (33.0 - 37.0 G/DL)  34.6 Cancelled
 
  RDW (11.5 - 14.5 %)  22.0  H Cancelled
 
  Plt Count (130 - 400 /CUMM)  153 Cancelled
 
  MPV (7.4 - 10.4 FL)  7.2  L Cancelled
 
  Gran % (42.2 - 75.2 %)  75.6  H 
 
  Lymphocytes % (20.5 - 51.1 %)  10.6  L 
 
  Monocytes % (1.7 - 9.3 %)  12.6  H 
 
  Eosinophils % (0 - 5 %)  0.8 
 
  Basophils % (0.0 - 2.0 %)  0.4 
 
  Absolute Granulocytes (1.4 - 6.5 /CUMM)  8.4  H 
 
  Absolute Lymphocytes (1.2 - 3.4 /CUMM)  1.2 
 
  Absolute Monocytes (0.10 - 0.60 /CUMM)  1.4  H 
 
  Absolute Eosinophils (0.0 - 0.7 /CUMM)  0.1 
 
  Absolute Basophils (0.0 - 0.2 /CUMM)  0 
 
  Haptoglobin Pending  
 
 
 
 
 04/29 04/29 04/29
 
 0418 0210 0200
 
Chemistry   
 
  Sodium (137 - 145 mmol/L)  120  L 
 
  Potassium (3.5 - 5.1 mmol/L)  4.0 
 
  Chloride (98 - 107 mmol/L)  82  L 
 
  Carbon Dioxide (22 - 30 mmol/L)  27 
 
  Anion Gap (5 - 16)  11 
 
  BUN (7 - 17 mg/dL)  34  H 
 
  Creatinine (0.5 - 1.0 mg/dL)  1.3  H 
 
  Estimated GFR (>60 ml/min)  47  L 
 
  Glucose (65 - 99 mg/dL)  101  H 
 
  Calcium (8.4 - 10.2 mg/dL)  8.2  L 
 
  Phosphorus (2.5 - 4.5 mg/dL)  3.5 
 
  Magnesium (1.6 - 2.3 mg/dL)  1.9 
 
  Total Bilirubin (0.2 - 1.3 mg/dL)  6.3  H 
 
  AST (14 - 36 U/L)  91  H 
 
  ALT (9 - 52 U/L)  47 
 
  Lactate Dehydrogenase (313 - 618 U/L)  358 
 
  Albumin (3.5 - 5.0 g/dL)  3.2  L 
 
  Cortisol AM Sample (4.46 - 22.7 ug/dL)  16.5 
 
Coagulation   
 
  APTT Cancelled  
 
Hematology   
 
  CBC w Diff  NO MAN DIFF REQ 
 
  WBC (4.8 - 10.8 /CUMM)  9.6 
 
  RBC (4.20 - 5.40 /CUMM)  1.30  L 
 
  Hgb (12.0 - 16.0 G/DL)  5.1 *L 
 
  Hct (37 - 47 %)  14.5 *L 
 
  MCV (81.0 - 99.0 FL)  111.2  H 
 
  MCH (27.0 - 31.0 PG)  39.5  H 
 
  MCHC (33.0 - 37.0 G/DL)  35.5 
 
  RDW (11.5 - 14.5 %)  14.0 
 
  Plt Count (130 - 400 /CUMM)  144 
 
  MPV (7.4 - 10.4 FL)  7.2  L 
 
  Gran % (42.2 - 75.2 %)  75.4  H 
 
  Lymphocytes % (20.5 - 51.1 %)  12.9  L 
 
  Monocytes % (1.7 - 9.3 %)  10.7  H 
 
  Eosinophils % (0 - 5 %)  0.8 
 
  Basophils % (0.0 - 2.0 %)  0.2 
 
  Absolute Granulocytes (1.4 - 6.5 /CUMM)  7.2  H 
 
  Absolute Lymphocytes (1.2 - 3.4 /CUMM)  1.2 
 
  Absolute Monocytes (0.10 - 0.60 /CUMM)  1.0  H 
 
  Absolute Eosinophils (0.0 - 0.7 /CUMM)  0.1 
 
  Absolute Basophils (0.0 - 0.2 /CUMM)  0 
 
  Retic Count (0.5 - 2.0 %)  4.80  H 
 
  Haptoglobin   Cancelled
 
 
 
 
 04/29 04/28 04/28
 
 0000 2330 2207
 
Chemistry   
 
  Ammonia (9 - 30 umol/L)   37  H
 
Hematology   
 
  CBC w Diff Cancelled NO MAN DIFF REQ 
 
  WBC (4.8 - 10.8 /CUMM) Cancelled 13.2  H 
 
  RBC (4.20 - 5.40 /CUMM) Cancelled 1.70  L 
 
  Hgb (12.0 - 16.0 G/DL) Cancelled 6.8 *L 
 
  Hct (37 - 47 %) Cancelled 19.2 *L 
 
  MCV (81.0 - 99.0 FL) Cancelled 113.1  H 
 
  MCH (27.0 - 31.0 PG) Cancelled 39.9  H 
 
  MCHC (33.0 - 37.0 G/DL) Cancelled 35.3 
 
  RDW (11.5 - 14.5 %) Cancelled 13.6 
 
  Plt Count (130 - 400 /CUMM) Cancelled 187 
 
  MPV (7.4 - 10.4 FL) Cancelled 7.7 
 
  Gran % (42.2 - 75.2 %)  79.5  H 
 
  Lymphocytes % (20.5 - 51.1 %)  8.9  L 
 
  Monocytes % (1.7 - 9.3 %)  11.0  H 
 
  Eosinophils % (0 - 5 %)  0.5 
 
  Basophils % (0.0 - 2.0 %)  0.1 
 
  Absolute Granulocytes (1.4 - 6.5 /CUMM)  10.5  H 
 
  Absolute Lymphocytes (1.2 - 3.4 /CUMM)  1.2 
 
  Absolute Monocytes (0.10 - 0.60 /CUMM)  1.4  H 
 
  Absolute Eosinophils (0.0 - 0.7 /CUMM)  0.1 
 
  Absolute Basophils (0.0 - 0.2 /CUMM)  0 
 
 
 
 
 04/28 04/28 04/28
 
 2151 1552 1552
 
Chemistry   
 
  Sodium (137 - 145 mmol/L) 119 *L  
 
  Potassium (3.5 - 5.1 mmol/L) 4.3  
 
  Chloride (98 - 107 mmol/L) 80  L  
 
  Carbon Dioxide (22 - 30 mmol/L) 28  
 
  Anion Gap (5 - 16) 11  
 
  BUN (7 - 17 mg/dL) 37  H  
 
  Creatinine (0.5 - 1.0 mg/dL) 1.2  H  
 
  Estimated GFR (>60 ml/min) 51  L  
 
  BUN/Creatinine Ratio (7 - 25 %) 30.8  H  
 
  Iron (37 - 170 ug/dL) 39  
 
  TIBC (265 - 497 ug/dL) 185  L  
 
  Ferritin (6.24 - 137 ng/mL) 1120.0  H  
 
  TSH &T3 &Free T4 Intrp (0.270 - 4.20 uIU/mL) 2.060  
 
Hematology   
 
  Lymphocytes (%)   8
 
  % Normal PMNs (%)   14
 
  Misc Hematology Test (%)     
 
Other Body Source   
 
  Fluid WBC (0 - 5 /CUMM)  138  H 
 
  Fld Total RBCs Counted (0 /CUMM)  88  H 
 
  Fluid Total Protein (g/dL)   < 2.0
 
  Fluid Albumin (g/dL)   < 1.0
 
  Fluid Lipase (U/L)   225
 
 
 
 
 04/28 04/28 04/28
 
 1417 1417 1417
 
Toxicology   
 
  Urine Opiates Screen (>2000 NG/ML)   < 100
 
  Methadone Screen (>300 NG/ML)   < 40
 
  Barbiturate Screen (>200 NG/ML)   < 60
 
  Ur Phencyclidine Scrn (>25 NG/ML)   < 6.00
 
  Amphetamines Screen (>1000 NG/ML)   156
 
  U Benzodiazepines Scrn (>200 NG/ML)   < 85
 
  Urine Cocaine Screen (>300 NG/ML)   < 50
 
  Urine Cannabis Screen (>50 NG/ML)   < 5.00
 
Urines   
 
  Urinalysis MOD  H  
 
  Urine Color (YEL,AMB,STR) [place]  
 
  Urine Clarity (CLEAR) HAZY  H  
 
  Urine pH (5.0 - 8.0) 5.5  
 
  Ur Specific Gravity (1.001 - 1.035) 1.025  
 
  Urine Protein (NEG,<30 MG/DL) NEG  
 
  Urine Ketones (NEG) NEG  
 
  Urine Nitrite (NEG) POS  H  
 
  Urine Bilirubin (NEG) POS@ICTO  H  
 
  Urine Urobilinogen (0.1  -  1.0 EU/dl) 2.0  H  
 
  Ur Leukocyte Esterase (NEG) NEG  
 
  Ur Microscopic SEDIMENT EXAMINED  
 
  Urine RBC (0 - 5 /HPF) 1-3  
 
  Ur Epithelial Cells (NONE,FEW) MOD  H  
 
  Urine Bacteria (NEG/NONE) FEW  H  
 
  Hyaline Casts (0/LPF) RARE  H  
 
  Urine Hemoglobin (NEG) NEG  
 
  Urine Osmolality (300 - 1000 MOSM/KG)  387 Cancelled
 
  Ur Random Creatinine (mg/dL)  169.0 Cancelled
 
  Ur Random Sodium (30 - 90 mmol/L)  < 5  L Cancelled
 
  Ur Random Potassium (mmol/L)  48.1 Cancelled
 
  Fraction Sodium Excret (<1% %)     Cancelled
 
  Urine Glucose (N MG/DL) NEG  
 
  Urine Pregnancy Test NEGATIVE  
 
 
 
 
 04/28 04/28 04/28
 
 1325 1325 1254
 
Chemistry   
 
  Sodium (137 - 145 mmol/L)  118 *L 
 
  Potassium (3.5 - 5.1 mmol/L)  4.2 
 
  Chloride (98 - 107 mmol/L)  79  L 
 
  Carbon Dioxide (22 - 30 mmol/L)  29 
 
  Anion Gap (5 - 16)  10 
 
  BUN (7 - 17 mg/dL)  36  H 
 
  Creatinine (0.5 - 1.0 mg/dL)  1.2  H 
 
  Estimated GFR (>60 ml/min)  51  L 
 
  BUN/Creatinine Ratio (7 - 25 %)  30.0  H 
 
  Glucose (65 - 99 mg/dL)  115  H 
 
  Serum Osmolality (285 - 295 MOSM/KG)  264  L 
 
  Calcium (8.4 - 10.2 mg/dL)  8.2  L 
 
  Magnesium (1.6 - 2.3 mg/dL)  2.0 
 
  TIBC (265 - 497 ug/dL)  187  L 
 
  Ferritin (6.24 - 137 ng/mL)  > 1000.0  H 
 
  Total Bilirubin (0.2 - 1.3 mg/dL)  8.4  H 
 
  Direct Bilirubin (< 0.4 mg/dL)  6.7  H 
 
  AST (14 - 36 U/L)  147  H 
 
  ALT (9 - 52 U/L)  52 
 
  Alkaline Phosphatase (<127 U/L)  293  H 
 
  Lactate Dehydrogenase (313 - 618 U/L)  633  H 
 
  Troponin I (< 0.11 ng/ml)  < 0.01 
 
  Total Protein (6.3 - 8.2 g/dL)  5.9  L 
 
  Albumin (3.5 - 5.0 g/dL)  2.6  L 
 
  Globulin (1.9 - 4.2 gm/dL)  3.3 
 
  Albumin/Globulin Ratio (1.1 - 2.2 %)  0.8  L 
 
  Lipase (23 - 300 U/L)  2054  H 
 
  Alpha Fetoprotein Pending  
 
  Vitamin B12 (239 - 931 pg/mL)  > 1000  H 
 
  Folate (2.76 - 20.0 ng/mL)  9.0 
 
Coagulation   
 
  PT (9.4 - 12.5 SEC)  15.1  H 
 
  INR (0.90 - 1.19)  1.38  H 
 
  APTT (25 - 37 SEC)  30 
 
Hematology   
 
  CBC w Diff  NO MAN DIFF REQ 
 
  WBC (4.8 - 10.8 /CUMM)  15.4  H 
 
  RBC (4.20 - 5.40 /CUMM)  1.78  L 
 
  Hgb (12.0 - 16.0 G/DL)  6.9 *L 
 
  Hct (37 - 47 %)  20.1  L 
 
  MCV (81.0 - 99.0 FL)  113.3  H 
 
  MCH (27.0 - 31.0 PG)  39.0  H 
 
  MCHC (33.0 - 37.0 G/DL)  34.5 
 
  RDW (11.5 - 14.5 %)  14.1 
 
  Plt Count (130 - 400 /CUMM)  216 
 
  MPV (7.4 - 10.4 FL)  7.9 
 
  Gran % (42.2 - 75.2 %)  82.7  H 
 
  Lymphocytes % (20.5 - 51.1 %)  7.0  L 
 
  Monocytes % (1.7 - 9.3 %)  9.7  H 
 
  Eosinophils % (0 - 5 %)  0.4 
 
  Basophils % (0.0 - 2.0 %)  0.2 
 
  Absolute Granulocytes (1.4 - 6.5 /CUMM)  12.7  H 
 
  Absolute Lymphocytes (1.2 - 3.4 /CUMM)  1.1  L 
 
  Absolute Monocytes (0.10 - 0.60 /CUMM)  1.5  H 
 
  Absolute Eosinophils (0.0 - 0.7 /CUMM)  0.1 
 
  Absolute Basophils (0.0 - 0.2 /CUMM)  0 
 
Serology   
 
  Hepatitis A IgM Ab (NONREACTIVE)  Pending 
 
  Hep Bs Antigen (NONREACTIVE)  Pending 
 
  Hep B Core IgM Ab Conf (NONREACTIVE)  Pending 
 
  Hepatitis C Antibody (NONREACTIVE)  Pending 
 
  HIV 1&2 Ab Western Blot (NONREACTIVE)  NONREACTIVE 
 
Toxicology   
 
  Salicylates (0 - 20.0 mg/dL)  2.8 
 
  Acetaminophen (10.0 - 30.0 ug/mL)  < 10.0  L 
 
  Serum Alcohol (<10 MG/DL)  13.0 Cancelled
 
 
 
Imaging/Other Studies:
EXAMINATION:
XR PORTABLE CHEST
 
CLINICAL INFORMATION:
Leukocytosis.
 
COMPARISON:
None
 
TECHNIQUE:
Portable frontal view of the chest was obtained.
 
FINDINGS:
No significant abnormality is noted involving the heart, lungs, mediastinum,
bony thorax or soft tissues.
 
IMPRESSION:
Unremarkable examination
 
EXAM TYPE: US - US-LIMITED ABDOMEN:
 
FINDINGS:
PANCREAS: The visualized pancreatic head and body are normal in appearance.
The remainder of the pancreas is obscured from visualization by the overlying
bowel gas.
 
LIVER: The liver is of normal size and mildly coarsened echogenicity without
focal lesions nor intrahepatic biliary ductal dilation. There is subcapsular
nodularity demonstrable.
 
GALLBLADDER: There is no cholelithiasis. There is echogenic bile within the
gallbladder lumen. There is gallbladder wall thickening measuring
approximately 6 mm.
 
COMMON BILE DUCT: Normal in caliber measuring 0.3 cm in diameter.
 
RIGHT KIDNEY: Normal. No hydronephrosis. No renal calculi or focal
parenchymal lesions.  The kidney measures 11.2 cm in maximum dimension.
 
FREE FLUID: There is a moderate amount of free fluid noted within the upper
abdomen.
 
IMPRESSION:
Liver of coarse and echogenicity without focal lesions. There is subcapsular
nodularity. The appearance is nonspecific, but could be representative of
cirrhosis.
 
No hepatic mass lesions are demonstrable.
 
There is echogenic bile without cholelithiasis. There is gallbladder wall
thickening, likely accentuated due to adjacent ascites.
 
Moderate amount of ascites.
 
Assessment/Plan
 
Assessment/Recommendations
Assessment:
1. BRETT: major differential between prerenal due to low IV volume vs hepatorenal 
syndrome; ATN less likely. R kidney OK & doubt obstruction L kidney but need to 
exclude via US. Favor continue volume expansion w colloid (SP Alb & prbc) as add
midodrine & octreotide. No renal replacement indication.
2. Hyponatremia: severe but chronic & asx. Due to excess free water intake in 
face of severe liver dx/portal HTN. Needs continued fuid restriction but no 
indication for hypertonic saline or V2 receptor blockade.
Recommendations:
1. continue 500 ml/day total fluid restriction --> po & IV
2. SP Alb 25 gram IV q8
3. transfuse as needed
4. midodrine 10 mg po q8
5. octreotide 100 mcg sc q8
6. Renal US
7. serial chemistries

## 2018-04-30 VITALS — SYSTOLIC BLOOD PRESSURE: 132 MMHG | DIASTOLIC BLOOD PRESSURE: 70 MMHG

## 2018-04-30 VITALS — DIASTOLIC BLOOD PRESSURE: 58 MMHG | SYSTOLIC BLOOD PRESSURE: 122 MMHG

## 2018-04-30 VITALS — DIASTOLIC BLOOD PRESSURE: 67 MMHG | SYSTOLIC BLOOD PRESSURE: 114 MMHG

## 2018-04-30 VITALS — SYSTOLIC BLOOD PRESSURE: 110 MMHG | DIASTOLIC BLOOD PRESSURE: 64 MMHG

## 2018-04-30 VITALS — SYSTOLIC BLOOD PRESSURE: 115 MMHG | DIASTOLIC BLOOD PRESSURE: 72 MMHG

## 2018-04-30 VITALS — DIASTOLIC BLOOD PRESSURE: 70 MMHG | SYSTOLIC BLOOD PRESSURE: 112 MMHG

## 2018-04-30 VITALS — DIASTOLIC BLOOD PRESSURE: 50 MMHG | SYSTOLIC BLOOD PRESSURE: 110 MMHG

## 2018-04-30 VITALS — SYSTOLIC BLOOD PRESSURE: 104 MMHG | DIASTOLIC BLOOD PRESSURE: 50 MMHG

## 2018-04-30 LAB
ABSOLUTE GRANULOCYTE CT: 7.7 /CUMM (ref 1.4–6.5)
BASOPHILS # BLD: 0 /CUMM (ref 0–0.2)
BASOPHILS NFR BLD: 0.3 % (ref 0–2)
EOSINOPHIL # BLD: 0.1 /CUMM (ref 0–0.7)
EOSINOPHIL NFR BLD: 0.8 % (ref 0–5)
ERYTHROCYTE [DISTWIDTH] IN BLOOD BY AUTOMATED COUNT: 22.7 % (ref 11.5–14.5)
GRANULOCYTES NFR BLD: 76.8 % (ref 42.2–75.2)
HCT VFR BLD CALC: 25.3 % (ref 37–47)
LYMPHOCYTES # BLD: 1.1 /CUMM (ref 1.2–3.4)
MCH RBC QN AUTO: 35.1 PG (ref 27–31)
MCHC RBC AUTO-ENTMCNC: 34.2 G/DL (ref 33–37)
MCV RBC AUTO: 102.6 FL (ref 81–99)
MONOCYTES # BLD: 1.1 /CUMM (ref 0.1–0.6)
PLATELET # BLD: 146 /CUMM (ref 130–400)
PMV BLD AUTO: 8 FL (ref 7.4–10.4)
PROTHROMBIN TIME: 17.5 SEC (ref 9.4–12.5)
RED BLOOD CELL CT: 2.46 /CUMM (ref 4.2–5.4)
WBC # BLD AUTO: 10.1 /CUMM (ref 4.8–10.8)

## 2018-04-30 NOTE — PN- RESIDENT CRCU
Whitney Tejeda 18 0707:
Subjective
HPI/CRCU Issues:
#1 acute hyponatremia, hypervolemic hypoosmolar hyponatremia
#2 decompensated cirrhosis, likely alcohol related
#3 moderate ascites
#4 leukocytosis
#5 acute pancreatitis
#6 anemia, likely ABLA
#7 alcohol abuse
#8 macrocytosis
#9 BRETT likey pre-renal
#10 r/o HRS type 1
#11 Asterexis, likely HE
 
24 Hour Events:
 
Tm 99.8
RR 78-99
Respiration 22
Blood pressure systolic , diastolic 54- 75.
 
she is alert awake and oriented to time place and person
No acute events noticed overnight
She reports mild abdominal discomfort from distention
Denies any fever, chills, severe abdominal pain
No constipation or diarrhea
 
Patient has ecchymosis of her right arm after IV infiltration of fluids/blood in
the emergency room, which is stable at this time. 
 
Objective
 
Vital Signs & I&O
Last 8 Hrs of Vitals and I&O:
 Intake & Output
 
 
  1600
 
Intake Total 250
 
Output Total 200
 
Balance 50
 
  
 
Intake, 
 
Intake, Oral 150
 
Number 1
 
Bowel 
 
Movements 
 
Output, Urine 200
 
Patient 134 lb
 
Weight 
 
Weight Bed scale
 
Measurement 
 
Method 
 
 
 
 
Exam
General Appearance: awake
Other Physical Findings:
General Exam: AAOx3, palor, No acute distress, Skin: No rashes, ecchymoses on 
the RUE likely from iv line infiltration;HEENT: PERRLA, EOMI;Neck: Supple, No 
JVDl; No cervical lymphadenopathy;CVS: Reg Rate, Normal S1,S2, No MGR; Resp: 
Normal air entry, no ronchi/rales;Abdomen: Soft, No tenderness, Normal Bowel 
Sounds, distention;Neuro: Normal Speech, Strength 5/5 b/l x 4 extremities, 
Sensation intact, CN III-XII NL, Reflexes 2+;Extremities: No cyanosis, no pedal 
edema
Current Medications:
 Current Medications
 
 
  Sig/Reagan Start time  Last
 
Medication Dose Route Stop Time Status Admin
 
Albumin Human 25 GM Q8H  0756 AC 
 
  IV   1530
 
Folic Acid 1 MG DAILY  0900 AC 
 
  PO   0927
 
Heparin Sodium  5,000 UNIT Q8  2200 AC 
 
(Porcine)  SC   
 
Lorazepam 0 Q1P PRN 2000 AC 
 
  IV   
 
Midodrine 10 MG 0800,1200,1600  0800 AC 
 
  PO   1537
 
Nicotine 14 MG DAILY AS NEEDED PRN  0730 AC 
 
  TOP   0835
 
Octreotide Acetate 100 MCG TID  0900 AC 
 
  SC   1406
 
Ondansetron HCl 4 MG ONCE ONE  1700 DC 
 
  IV  1701  1701
 
Oxycodone HCl 5 MG BID PRN  0245 AC 
 
  PO   0926
 
Pantoprazole Sodium 40 MG BID  0900 AC 
 
  IV   0930
 
Thiamine HCl 100 MG 1800  1800 AC 
 
Sodium Chloride 50 ML IV   1702
 
 
 
 
Impression/Plan
 
Impression/Problem List
Impression:
Ms Barros is  a 32-year-old woman with a past medical history of alcohol abuse 
came to the hospital with evaluation of acute hyponatremia likely secondary to 
decompensated cirrhosis.
 
At the time of admission-temperature 96.6, pulse rate 111, respiration 18, blood
pressure 109/64, pulse ox 99% on room air.  She was found to have stool 
Hemoccult positve.  He underwent diagnostic paracentesis while in the emergency 
room. 
 
Pertinent labs: 
 
WBC 15.4, 
Hemoglobin 6.9-->8.6, 
Platelet count 216-->146.  
Sodium 118-->126( 36hrs ).
Potassium 4.2, chloride 79, 
BUN 36 
Creatinine 1.2-->1.3
Serum osmolality 264 ( hypoosmolar ), urine sodium less than 5, FeNa not 
calculated  
Total bilirubin 8.4-->10
, AST 52 ( AST/ALT 2:1 consistent w/ alcohol use), alkaline phosphatase 
293
lipase 2054. 
INR 1.38-->1.6.  Albumin 2.6.
 
Paracentesis fluid .  Fluid albumin 1.0.  SAAG 1.6 and ascitic fluid < 
2.5 ( consistent w/ portal hypertension).  Discriminant function of 24. MELD-Na 
28 points w/ 27-32% mortality. Yoli C, with one-year estimated mortality of 
approximately 55%.
 
U tox negative.  Serum alcohol 13.0.  Urinalysis revealed urine nitrate positive
, leukocyte esterase negative. 
 
US abdomen:  Liver of coarse and echogenicity without focal lesions. There is 
subcapsular nodularity. The appearance is nonspecific, but could be 
representative of cirrhosis. No hepatic mass lesions are demonstrable.  There is
echogenic bile without cholelithiasis. There is gallbladder wall thickening, 
likely accentuated due to adjacent ascites. Moderate amount of ascites.
 
#1 acute hyponatremia, hypervolemic hypoosmolar hyponatremia
#2 decompensated cirrhosis, likely alcohol related
#3 moderate ascites
#4 leukocytosis
#5 acute pancreatitis
#6 anemia, likely ABLA
#7 alcohol abuse
#8 macrocytosis
#9 BRETT likey pre-renal
#10 r/o HRS type 1
#11 Asterexis, likely HE
 
 
 
Respiratory: 
Currently stable at this time 
 
 
Infectious:
Leukocytosis, with unclear etiology.  Also has granulocytosis indicating an 
acute infection.  Rule out for SBP. Checked urinalysis and urine culture.  
* No source of infection so far
* Body fluid ascites -culture negative for any organisms
* No pneumonia
* No urine infection
* Ceftriaxone was discontinued today as per Gastro recommendations
* Blood cultures, urine cultures negative so far
 
 
 
 
 
Circulatory
Blood pressure remains borderline.  Given her history of cirrhosis, and elevated
serum creatinine would keep hepatorenal syndrome in the differential.  
* Hold Lasix, spironolactone at this time.  
* Continue midodrine 10 mg every 8 hours. 
* continue octreotide 100 mg sc q8
 
 
Hematology: 
Symptomatic anemia, acute blood loss anemia with heme-positive stool
She has acute drop of hemoglobin, likely from ABLA.  She received 3 units of 
PRBC with a goal of hemoglobin of 8, given possible SBP.  Continued to monitor H
&H closely.  Transfusion of colloid, including albumin will likely increase 
intravascular volume.  Monitor for any bleeding complications.  Don't over 
zealously transfuse, since we do not know if the patient has any esophageal or 
rectal varices.
* Hemoglobin trend 6.9-->8.5
* Status post 3 unit blood transfusion
* Monitor H&H closely
* Continue albumin to increase intravascular volume
* Goal hemoglobin above 8
* Continue to monitor INR given coagulopathy
 
 
Metabolic: hyponatremia
Hypervolemic hypoosmolar hyponatremia could likely be due to cirrhosis, and 
decreased intravascular volume.  
* Checking BEP every 4 hours, with a goal correction of not more than 8 mEq in 
the next 24 hours.  
* If the BEP is overly corrected, would use D5 water. 
* Fluid restriction at this time of not more than 500 mL per day as per renal.  
* Sodium trend 118, 119, 120, 121, 122,126 in 36 hours
 
 
BRETT:
major differential between prerenal due to low IV volume vs hepatorenal syndrome
; ATN less likely.  Renal ultrasound ruled out obstruction. Serum creatinines 
elevated, which would likely be prerenal/hepatorenal syndrome given elevated 
creatinine 1.3 from 0.3 in a month.
* continue volume expansion w colloid (SP Alb & prbc) 
* No renal replacement indication.
* Continue albumin 25 mg TID, as per renal.
* Continue midodrine
* Continue octreotide
 
 
Alimentary:
reg diet
No plans for urgent EGD
 
 
Neurology: 
Stable at this time.  Pain management as required with oxycodone. Avoid morphine
as it worsens pancreatitis. In regards to alcohol use, would monitor CIWA; and 
lorazepam as per CIWA. No scheduled dosing at this time. If she stars to require
any lorazepam, she would need scheduled dosing. 
 
 
 
DVT PPx- Alps at this time, given heme positive stools, and anemia. Once resolve
, she should be on heparin given abnormal liver function. 
GI PPx- Protonix iv 
HE PPx- None at this time. Would consider Rifaxamin, given her h/o BRETT and 
inability to use lactulose at this  time. Although Rifaxamin is not clearly 
indicated in her first decompensation. 
 
Last EGD date: none. 
Last Screening US: 18
Hepatitis vaccination: date unknown. consider vaccination
Transplant evaluation: unknown, and would need sobriety for almost an year. 
 
 
 
#1 Central line- none 
#2 Arterial line- none( removed )
#3 Joyce catheter ( in place ) 
#4 Rectal tube ( none ) 
#5 NG tube ( none ) 
#6 IV/peripheral line- in place. 
#7 IV drips ( none ) 
#8 Vent settings: ( none ) 
#9 pressors ( none )
 
Problem List:
 1. Anemia
 
 2. Pancreatitis
 
 3. Ascites due to alcoholic cirrhosis
 
Pain Ratin
Tomorrow's Labs & Rationales:
cbc
bep
inr
 
Plan
DVT/Prophylaxis: Carlos Frances MD 18 1232:
Attending MD Review Statement
 
Attending Sign Off
Attending Cosign Statement:
I have: examined this patient, reviewed [facility] EMR data, personally reviewd 
images, discussd w/resident/PA/NP, discussed mgmt plan w/benitez, discussed mgmt 
plan w/CM, discussed mgmt plan w/pt, agreed w/resident/PA/NP, amended to note. 
Other Findings:
Impression
32 year old woman
 
* etoh dependence, recently cutting down
* hyponatremia - hypervolemic, hypoosmolar in the setting of liver cirrhosis - 
chronic
* BRETT - prerenal vs hepatorenal, less likely ATN
* coagulopathy likely secondary to liver disease
* anemia
 
Plan
Respiratory
-cxr normal, room air
 
ID
-no evidence of SBP, no fever at this time
 
 
CVS
-monitor hemodynamics
 
Heme
-GI consultation is appreciated
-full anemia workup - macrocytic, liver disease and etoh etiology
 
Metabolic
-nephrology consultation
-ins/outs
-monitor electrolytes
-octreotide, albumin, midodrine
-renal usg
-fluid restriction 500cc/hr
 
Alimentary
-fluid restriction as above
 
Neuro
-no acute issues
-elevated ammonia, see above
 
DVT prophylaxis at all times - ALPS
 
TTS 35 min
 
DG to gen med

## 2018-04-30 NOTE — TRANSFER OF CARE SUMMARY
Hospital Course
 
Course
Hospital Course:
 
Ms Barros is  a 32-year-old woman with a past medical history of alcohol abuse 
came to the hospital with evaluation of acute hyponatremia likely secondary to 
decompensated cirrhosis.
 
At the time of admission-temperature 96.6, pulse rate 111, respiration 18, blood
pressure 109/64, pulse ox 99% on room air.  She was found to have stool 
Hemoccult positve.  He underwent diagnostic paracentesis while in the emergency 
room. 
 
Pertinent labs: 
 
WBC 15.4 (04/28)-->11.5 (04/29)
Hemoglobin 6.9-->8.6 ( status post 3 PRBC transfusions)
Platelet count 216 (04/28)-->146 (04/30)  
Sodium 118-->126( 36hrs ).
Potassium 4.2, chloride 79, 
BUN 36 
Creatinine 1.2-->1.3
Serum osmolality 264 ( hypoosmolar ), urine sodium less than 5, FeNa not 
calculated  
Total bilirubin 8.4(04/28)-->10(04/30)  
, AST 52 ( AST/ALT 2:1 consistent w/ alcohol use), alkaline phosphatase 
293
lipase 2054. 
INR 1.38(04/28)-->1.6 (04/30)   Albumin 2.6.
 
Paracentesis fluid .  Fluid albumin 1.0.  SAAG 1.6 and ascitic fluid < 
2.5 ( consistent w/ portal hypertension).  Discriminant function of 24. MELD-Na 
28 points w/ 27-32% mortality. Yoli C, with one-year estimated mortality of 
approximately 55%.
 
U tox negative.  Serum alcohol 13.0.  Urinalysis revealed urine nitrate positive
, leukocyte esterase negative. 
 
US abdomen:  Liver of coarse and echogenicity without focal lesions. There is 
subcapsular nodularity. The appearance is nonspecific, but could be 
representative of cirrhosis. No hepatic mass lesions are demonstrable.  There is
echogenic bile without cholelithiasis. There is gallbladder wall thickening, 
likely accentuated due to adjacent ascites. Moderate amount of ascites.
 
#1 acute hyponatremia, hypervolemic hypoosmolar hyponatremia
#2 decompensated cirrhosis, likely alcohol related
#3 moderate ascites
#4 leukocytosis
#5 acute pancreatitis
#6 anemia, likely ABLA
#7 alcohol abuse
#8 macrocytosis
#9 BRETT likey pre-renal
#10 r/o HRS type 1
#11 Asterexis, likely HE
 
She was admitted to intensive care unit given her acute hyponatremia, and 
history of alcohol cirrhosis for monitoring of her mental status changes.  She 
was thought to have hypervolemic hypoosmolar hyponatremia could likely be due to
cirrhosis, and decreased intravascular volume.  Check BEP every 4 hours, with a 
goal correction of not more than 8 mEq every 24 hours.  She was started on fluid
restriction at this time of not more than 500 mL per day as per renal.  She was 
also given Albumin 1.5 g per KG body weight as per the gastroenterologist, as 
per SBP albumin+Ceftriaxone protocol initially, but was continued on albumin 25 
mg 3 times a day.  Sodium was monitored closely, with checks every 4 hours.  
Sodium continue to trend up appropriately with not more than 8 every 24 hours.  
It was also thought that Lasix was contributing to her hyponatremia, and hence 
was held momentarily.
 
She was initially started on ceftriaxone, given the possibility of SBP in the 
setting of fever, abdominal pain, guaiac positive stools, which was later 
discontinued after discussing with the gastroenterologist.  She remained largely
afebrile, and did not require any antibiotics.  Blood cultures, lower 
respiratory cultures and urine cultures remained negative.  Leukocytosis 
improved.  She did not have any other foci of infection.
 
In regards to her elevated lipase which was attributed to pancreatitis, which 
was to be monitored closely for any acute worsening in her hemodynamics.  She 
remained stable.  The management of pancreatitis was largely consultative.  The 
gastroenterologist-Dr. Dawson was consulted for advice.  She occasionally had 
tremors, which likely were asterixis or from alcohol withdrawal.  Ammonia was 
slightly elevated, but did not require any rifaximin.  Lactulose was held, given
her acute kidney injury.
 
Kidney function was monitored closely while she was in ICU.  Hepatorenal was in 
the differential, and was started on Midrin and octreotide with improvement in 
blood pressures.  She had high meld score, which would increase his mortality 
risk.  It was discussed with her to maintain sobriety, in order to have a 
transplant evaluation.  Hepatitis panel remained negative.  And so was HIV, 
which was nonreactive. Renal ultrasound negative for any obstruction ( detailed 
report below ). 
 
She was given 3 units of PRBC transfusion, for her acute anemia, which could be 
due to blood loss.  H&H remained stable at the time of transfer.  She had 
slightly lower Iron, with low TIBC, and elevated ferritin likely indicating 
acute on chronic infection contributing to anemia.  Elevated MCV, likely from 
nutrition.
 
For abdominal distention, she would likely need a large volume paracentesis at 
some point.  She has decompensated liver cirrhosis, with no workup of upper 
endoscopy or management of portal pressures.  It appears that she has ascites 
secondary to portal hypertension.  She needs to be seen at the higher level of 
care such as [place] [facility] for transplant evaluation.  She should also
get yearly ultrasounds done, as well as her vaccination status updated. 
 
 
 
#1 Central line-none
#2 Arterial line- none
#3 Joyce catheter - none
#4 Rectal tube - none
#5 NG tube - none
#6 IV/peripheral line - none
#7 IV drips - none. Fluid restriction 500 ml. 
#8 Vent settings none.  
#9 pressors none. 
 
Consults- 
 
1. Gastroenterology- Dr Walters
2. Nephrology Dr. Wade  
 
Follow up: 
 
1. Discoloration in the RUE likely from repeat trials for venous access.
2. Elevated INR, Liver enzymes, MELD
 
 
 
 
 
 
 
Pertinent Lab Results:
US-DUPLEX VENOUS EXTREM UNI  04/29/
 
Normal triplex scan; no evidence of deep venous thrombosis in the right upper 
extremity.
 
 
--------------------------------------------------------------------------------
-------
 
US - US-RENAL/KIDNEY  04/29/
 
No acute sonographic findings in either kidney compared to 03/15/2018. The
kidneys have normal echotexture. No evidence of nephrolithiasis or
hydronephrosis.
 
Moderate ascites is present.
 
 
 
--------------------------------------------------------------------------------
---------
 
US-LIMITED ABDOMEN  04/28/
 
Liver of coarse and echogenicity without focal lesions. There is subcapsular
nodularity. The appearance is nonspecific, but could be representative of
cirrhosis.
 
No hepatic mass lesions are demonstrable.
 
There is echogenic bile without cholelithiasis. There is gallbladder wall
thickening, likely accentuated due to adjacent ascites.
 
Moderate amount of ascites.
 
 
--------------------------------------------------------------------------------
---------
 
 
 
 
Assessment/Plan:
as above. 
 
Attending MD Review Statement
Documenting Attending:
Alvin CORNEJO,Carlos

## 2018-04-30 NOTE — EVENT NOTE
Event Note
Event Note:
 
Started on sc heparin after discussing with Dr. Dawson, given history of liver
disease. If she has any drop in H&H or has an active GI bleed, would dc heparin.

## 2018-05-01 VITALS — SYSTOLIC BLOOD PRESSURE: 110 MMHG | DIASTOLIC BLOOD PRESSURE: 58 MMHG

## 2018-05-01 VITALS — SYSTOLIC BLOOD PRESSURE: 110 MMHG | DIASTOLIC BLOOD PRESSURE: 62 MMHG

## 2018-05-01 VITALS — DIASTOLIC BLOOD PRESSURE: 58 MMHG | SYSTOLIC BLOOD PRESSURE: 110 MMHG

## 2018-05-01 VITALS — DIASTOLIC BLOOD PRESSURE: 46 MMHG | SYSTOLIC BLOOD PRESSURE: 102 MMHG

## 2018-05-01 VITALS — DIASTOLIC BLOOD PRESSURE: 78 MMHG | SYSTOLIC BLOOD PRESSURE: 110 MMHG

## 2018-05-01 VITALS — SYSTOLIC BLOOD PRESSURE: 102 MMHG | DIASTOLIC BLOOD PRESSURE: 46 MMHG

## 2018-05-01 LAB
ABSOLUTE GRANULOCYTE CT: 7.2 /CUMM (ref 1.4–6.5)
BASOPHILS # BLD: 0 /CUMM (ref 0–0.2)
BASOPHILS NFR BLD: 0.3 % (ref 0–2)
EOSINOPHIL # BLD: 0.1 /CUMM (ref 0–0.7)
EOSINOPHIL NFR BLD: 0.9 % (ref 0–5)
ERYTHROCYTE [DISTWIDTH] IN BLOOD BY AUTOMATED COUNT: 21.6 % (ref 11.5–14.5)
GRANULOCYTES NFR BLD: 74.8 % (ref 42.2–75.2)
HCT VFR BLD CALC: 25.1 % (ref 37–47)
LYMPHOCYTES # BLD: 1.2 /CUMM (ref 1.2–3.4)
MCH RBC QN AUTO: 35.4 PG (ref 27–31)
MCHC RBC AUTO-ENTMCNC: 34.3 G/DL (ref 33–37)
MCV RBC AUTO: 103.2 FL (ref 81–99)
MONOCYTES # BLD: 1.1 /CUMM (ref 0.1–0.6)
PLATELET # BLD: 145 /CUMM (ref 130–400)
PMV BLD AUTO: 7.7 FL (ref 7.4–10.4)
PROTHROMBIN TIME: 19.5 SEC (ref 9.4–12.5)
RED BLOOD CELL CT: 2.43 /CUMM (ref 4.2–5.4)
WBC # BLD AUTO: 9.6 /CUMM (ref 4.8–10.8)

## 2018-05-01 NOTE — PN- GASTROENTEROLOGY
Assessment/Plan GI
Assessment/Recommendations:
Assessment/Recommendations:
ASESSMENT:
1. Alcoholic Cirrhodis
2.  Hepatorenal Syndrome
3.  Hyponatremia -- improving
4.  Anemia -- multifactorial
5.  Hypoalbuminemia -- improved
6.  Alcohol Depenence
 
RECOMMENDATIONS:
1.  Continue octreotide, albumin and middrodrine
2.  Follow BUN/Cr, PT/INR, Mental Status Exam, CBC
3.  Plan EGD tomorrow for screening for esophageal varices 
4. Check alpha-fetoprotein
5.  Nothing by mouth after midnight.  This is been discussed with the patient.
 
 
 
 
Subjective
Subjective:
Patient is doing well.  Has had no nausea vomiting or abdominal pain.  Denies 
melena nor hematemesis.  She is had no bright red blood per rectum.
 
Objective
Vital Signs and I&Os
Vital Signs
 
 
Date Time Temp Pulse Resp B/P B/P Pulse O2 O2 Flow FiO2
 
     Mean Ox Delivery Rate 
 
05/01 1600 98.1 83 18 110/58     
 
05/01 1400  84 16 110/58     
 
05/01 1337 98.1 83 18 110/58  98 Room Air  
 
05/01 1200  92 16 110/62     
 
05/01 1139    110/62     
 
05/01 1000 98.8 92 16 102/46     
 
05/01 0800       Room Air  
 
05/01 0800 98.8 92 16 102/46     
 
05/01 0705 98.8 92 16 102/46  94 Room Air  
 
04/30 2215 99.3 78 20 112/70  92   
 
 
 Intake & Output
 
 
 05/01 1600 05/01 0400 04/30 1600 04/30 0400 04/29 1600 04/29 0400
 
Intake Total 500 493  
 
Output Total  25 300 300 510 
 
Balance 500 468 150 -60 703 
 
       
 
Intake, Blood   100  743 
 
Product      
 
Intake,  343 100 150 430 
 
Intake, Oral 270 150 250 90 40 
 
Number   1 1 0 
 
Bowel      
 
Movements      
 
Output,  25    
 
Emesis      
 
Output, Urine   300 300 510 
 
Patient  129 lb 134 lb   127 lb
 
Weight      
 
Weight   Bed scale   Bed scale
 
Measurement      
 
Method      
 
 
 
 
Physical Exam
General Appearance: no apparent distress, alert, awake, cachetic
Respiratory: lungs clear
Cardiovascular: regular rate/rhythm
Abdomen: normal bowel sounds, distention
Neurologic/Psychiatric: no motor/sensory deficits, awake, alert, oriented x 3
Skin: jaundice
Current Medications:
 Current Medications
 
 
  Sig/Reagan Start time  Last
 
Medication Dose Route Stop Time Status Admin
 
Albumin Human 25 GM Q8H 04/29 0756 AC 05/01
 
  IV   1557
 
Folic Acid 1 MG DAILY 04/29 0900 AC 05/01
 
  PO   0755
 
Heparin Sodium  5,000 UNIT Q8 04/30 2200 AC 05/01
 
(Porcine)  SC   1347
 
Lorazepam 0 Q1P PRN 04/28 2000 AC 04/30
 
  IV   2036
 
Midodrine 10 MG 0800,1200,1600 04/29 0800 AC 05/01
 
  PO   1559
 
Nicotine 14 MG DAILY AS NEEDED PRN 04/29 0730 AC 04/29
 
  TOP   0835
 
Octreotide Acetate 100 MCG TID 04/29 0900 AC 05/01
 
  SC   1348
 
Ondansetron HCl 4 MG ONCE ONE 04/30 1700 DC 04/30
 
  IV 04/30 1701  1701
 
Oxycodone HCl 5 MG BID PRN 04/29 0245 AC 05/01
 
  PO   1558
 
Pantoprazole Sodium 40 MG BID 04/29 0900 AC 05/01
 
  IV   0755
 
Thiamine HCl 100 MG 1800 04/29 1800 AC 04/30
 
Sodium Chloride 50 ML IV   1702
 
 
 
 
Results
Pertinent Lab Results:
 Laboratory Tests
 
 
 05/01 05/01 05/01
 
 1030 0615 0615
 
Chemistry   
 
  Sodium (137 - 145 mmol/L)   128  L
 
  Potassium (3.5 - 5.1 mmol/L)   3.8
 
  Chloride (98 - 107 mmol/L)   89  L
 
  Carbon Dioxide (22 - 30 mmol/L)   27
 
  Anion Gap (5 - 16)   12
 
  BUN (7 - 17 mg/dL)   39  H
 
  Creatinine (0.5 - 1.0 mg/dL)   1.2  H
 
  Estimated GFR (>60 ml/min)   51  L
 
  BUN/Creatinine Ratio (7 - 25 %)   32.5  H
 
  Total Bilirubin (0.2 - 1.3 mg/dL)   9.6  H
 
  Direct Bilirubin (< 0.4 mg/dL)   6.3  H
 
  AST (14 - 36 U/L)   87  H
 
  ALT (9 - 52 U/L)   40
 
  Alkaline Phosphatase (<127 U/L)   155  H
 
  Total Protein (6.3 - 8.2 g/dL)   5.8  L
 
  Albumin (3.5 - 5.0 g/dL)   3.5
 
  Alpha Fetoprotein  Cancelled 
 
Coagulation   
 
  PT (9.4 - 12.5 SEC) 19.5  H  
 
  INR (0.90 - 1.19) 1.78  H  
 
Hematology   
 
  CBC w Diff   NO MAN DIFF REQ
 
  WBC (4.8 - 10.8 /CUMM)   9.6
 
  RBC (4.20 - 5.40 /CUMM)   2.43  L
 
  Hgb (12.0 - 16.0 G/DL)   8.6  L
 
  Hct (37 - 47 %)   25.1  L
 
  MCV (81.0 - 99.0 FL)   103.2  H
 
  MCH (27.0 - 31.0 PG)   35.4  H
 
  MCHC (33.0 - 37.0 G/DL)   34.3
 
  RDW (11.5 - 14.5 %)   21.6  H
 
  Plt Count (130 - 400 /CUMM)   145
 
  MPV (7.4 - 10.4 FL)   7.7
 
  Gran % (42.2 - 75.2 %)   74.8
 
  Lymphocytes % (20.5 - 51.1 %)   12.8  L
 
  Monocytes % (1.7 - 9.3 %)   11.2  H
 
  Eosinophils % (0 - 5 %)   0.9
 
  Basophils % (0.0 - 2.0 %)   0.3
 
  Absolute Granulocytes (1.4 - 6.5 /CUMM)   7.2  H
 
  Absolute Lymphocytes (1.2 - 3.4 /CUMM)   1.2
 
  Absolute Monocytes (0.10 - 0.60 /CUMM)   1.1  H
 
  Absolute Eosinophils (0.0 - 0.7 /CUMM)   0.1
 
  Absolute Basophils (0.0 - 0.2 /CUMM)   0
 
 
 
 
 05/01 04/30 04/30 04/30 04/30
 
 0145 2300 1800 1410 0920
 
Chemistry     
 
  Sodium (137 - 145 mmol/L) 129  L 128  L 129  L 124  L 125  L
 
 
 
 
 04/30 04/30
 
 0450 0130
 
Chemistry  
 
  Sodium (137 - 145 mmol/L) 126  L 124  L
 
  Potassium (3.5 - 5.1 mmol/L) 3.7 
 
  Chloride (98 - 107 mmol/L) 85  L 
 
  Carbon Dioxide (22 - 30 mmol/L) 28 
 
  Anion Gap (5 - 16) 13 
 
  BUN (7 - 17 mg/dL) 37  H 
 
  Creatinine (0.5 - 1.0 mg/dL) 1.3  H 
 
  Estimated GFR (>60 ml/min) 47  L 
 
  Glucose (65 - 99 mg/dL) 117  H 
 
  Calcium (8.4 - 10.2 mg/dL) 8.8 
 
  Phosphorus (2.5 - 4.5 mg/dL) 2.9 
 
  Magnesium (1.6 - 2.3 mg/dL) 2.0 
 
  Total Bilirubin (0.2 - 1.3 mg/dL) 10.0  H 
 
  AST (14 - 36 U/L) 93  H 
 
  ALT (9 - 52 U/L) 41 
 
  Albumin (3.5 - 5.0 g/dL) 3.4  L 
 
Coagulation  
 
  PT (9.4 - 12.5 SEC) 17.5  H 
 
  INR (0.90 - 1.19) 1.60  H 
 
Hematology  
 
  CBC w Diff NO MAN DIFF REQ 
 
  WBC (4.8 - 10.8 /CUMM) 10.1 
 
  RBC (4.20 - 5.40 /CUMM) 2.46  L 
 
  Hgb (12.0 - 16.0 G/DL) 8.6  L 
 
  Hct (37 - 47 %) 25.3  L 
 
  MCV (81.0 - 99.0 FL) 102.6  H 
 
  MCH (27.0 - 31.0 PG) 35.1  H 
 
  MCHC (33.0 - 37.0 G/DL) 34.2 
 
  RDW (11.5 - 14.5 %) 22.7  H 
 
  Plt Count (130 - 400 /CUMM) 146 
 
  MPV (7.4 - 10.4 FL) 8.0 
 
  Gran % (42.2 - 75.2 %) 76.8  H 
 
  Lymphocytes % (20.5 - 51.1 %) 11.4  L 
 
  Monocytes % (1.7 - 9.3 %) 10.7  H 
 
  Eosinophils % (0 - 5 %) 0.8 
 
  Basophils % (0.0 - 2.0 %) 0.3 
 
  Absolute Granulocytes (1.4 - 6.5 /CUMM) 7.7  H 
 
  Absolute Lymphocytes (1.2 - 3.4 /CUMM) 1.1  L 
 
  Absolute Monocytes (0.10 - 0.60 /CUMM) 1.1  H 
 
  Absolute Eosinophils (0.0 - 0.7 /CUMM) 0.1 
 
  Absolute Basophils (0.0 - 0.2 /CUMM) 0 
 
 
 
 
 04/29 04/29
 
 2100 1610
 
Chemistry  
 
  Sodium (137 - 145 mmol/L) 123  L 122  L
 
Hematology  
 
  CBC w Diff NO MAN DIFF REQ 
 
  WBC (4.8 - 10.8 /CUMM) 11.8  H 
 
  RBC (4.20 - 5.40 /CUMM) 2.54  L 
 
  Hgb (12.0 - 16.0 G/DL) 8.9  L 
 
  Hct (37 - 47 %) 26.2  L 
 
  MCV (81.0 - 99.0 FL) 103.3  H 
 
  MCH (27.0 - 31.0 PG) 34.9  H 
 
  MCHC (33.0 - 37.0 G/DL) 33.8 
 
  RDW (11.5 - 14.5 %) 23.4  H 
 
  Plt Count (130 - 400 /CUMM) 139 
 
  MPV (7.4 - 10.4 FL) 7.8 
 
  Gran % (42.2 - 75.2 %) 76.6  H 
 
  Lymphocytes % (20.5 - 51.1 %) 10.6  L 
 
  Monocytes % (1.7 - 9.3 %) 10.8  H 
 
  Eosinophils % (0 - 5 %) 0.8 
 
  Basophils % (0.0 - 2.0 %) 1.2 
 
  Absolute Granulocytes (1.4 - 6.5 /CUMM) 9.0  H 
 
  Absolute Lymphocytes (1.2 - 3.4 /CUMM) 1.2 
 
  Absolute Monocytes (0.10 - 0.60 /CUMM) 1.3  H 
 
  Absolute Eosinophils (0.0 - 0.7 /CUMM) 0.1 
 
  Absolute Basophils (0.0 - 0.2 /CUMM) 0.1 
 
 
 
 
 04/29 04/29 04/29
 
 1149 UNK 0615
 
Chemistry   
 
  Sodium (137 - 145 mmol/L) 122  L Cancelled 
 
  Potassium (3.5 - 5.1 mmol/L) 4.1 Cancelled 
 
  Chloride (98 - 107 mmol/L) 84  L Cancelled 
 
  Carbon Dioxide (22 - 30 mmol/L) 26 Cancelled 
 
  Anion Gap (5 - 16) 12 Cancelled 
 
  BUN (7 - 17 mg/dL) 35  H Cancelled 
 
  Creatinine (0.5 - 1.0 mg/dL) 1.3  H Cancelled 
 
  Estimated GFR (>60 ml/min) 47  L  
 
  Glucose (65 - 99 mg/dL) 92 Cancelled 
 
  Calcium (8.4 - 10.2 mg/dL) 8.6 Cancelled 
 
  Phosphorus (2.5 - 4.5 mg/dL) 3.5 Cancelled 
 
  Magnesium (1.6 - 2.3 mg/dL) 2.0 Cancelled 
 
  Total Bilirubin (0.2 - 1.3 mg/dL) 8.8  H Cancelled 
 
  AST (14 - 36 U/L) 91  H Cancelled 
 
  ALT (9 - 52 U/L) 43 Cancelled 
 
  Albumin (3.5 - 5.0 g/dL) 3.3  L Cancelled 
 
Hematology   
 
  CBC w Diff NO MAN DIFF REQ  
 
  WBC (4.8 - 10.8 /CUMM) 11.5  H  
 
  RBC (4.20 - 5.40 /CUMM) 2.44  L  
 
  Hgb (12.0 - 16.0 G/DL) 8.5  L  
 
  Hct (37 - 47 %) 24.8  L  
 
  MCV (81.0 - 99.0 FL) 101.9  H  
 
  MCH (27.0 - 31.0 PG) 34.8  H  
 
  MCHC (33.0 - 37.0 G/DL) 34.2  
 
  RDW (11.5 - 14.5 %) 22.7  H  
 
  Plt Count (130 - 400 /CUMM) 152  
 
  MPV (7.4 - 10.4 FL) 7.6  
 
  Gran % (42.2 - 75.2 %) 79.1  H  
 
  Lymphocytes % (20.5 - 51.1 %) 9.2  L  
 
  Monocytes % (1.7 - 9.3 %) 10.6  H  
 
  Eosinophils % (0 - 5 %) 0.5  
 
  Basophils % (0.0 - 2.0 %) 0.6  
 
  Absolute Granulocytes (1.4 - 6.5 /CUMM) 9.1  H  
 
  Absolute Lymphocytes (1.2 - 3.4 /CUMM) 1.1  L  
 
  Absolute Monocytes (0.10 - 0.60 /CUMM) 1.2  H  
 
  Absolute Eosinophils (0.0 - 0.7 /CUMM) 0.1  
 
  Absolute Basophils (0.0 - 0.2 /CUMM) 0.1  
 
  Haptoglobin   Pending
 
 
 
 
 04/29 04/29 04/29
 
 0612 9818 1881
 
Chemistry   
 
  Sodium (137 - 145 mmol/L) 121  L  
 
  Potassium (3.5 - 5.1 mmol/L) 4.0  
 
  Chloride (98 - 107 mmol/L) 84  L  
 
  Carbon Dioxide (22 - 30 mmol/L) 27  
 
  Anion Gap (5 - 16) 10  
 
  BUN (7 - 17 mg/dL) 36  H  
 
  Creatinine (0.5 - 1.0 mg/dL) 1.3  H  
 
  Estimated GFR (>60 ml/min) 47  L  
 
  Glucose (65 - 99 mg/dL) 103  H  
 
  Calcium (8.4 - 10.2 mg/dL) 8.3  L  
 
  Phosphorus (2.5 - 4.5 mg/dL) 3.5  
 
  Magnesium (1.6 - 2.3 mg/dL) 1.9  
 
  Total Bilirubin (0.2 - 1.3 mg/dL) 6.9  H  
 
  AST (14 - 36 U/L) 90  H  
 
  ALT (9 - 52 U/L) 45  
 
  Albumin (3.5 - 5.0 g/dL) 2.9  L  
 
Coagulation   
 
  PT (9.4 - 12.5 SEC) 17.3  H  
 
  INR (0.90 - 1.19) 1.58  H  
 
  APTT (25 - 37 SEC) 35  Cancelled
 
Hematology   
 
  CBC w Diff NO MAN DIFF REQ Cancelled 
 
  WBC (4.8 - 10.8 /CUMM) 11.1  H Cancelled 
 
  RBC (4.20 - 5.40 /CUMM) 1.85  L Cancelled 
 
  Hgb (12.0 - 16.0 G/DL) 6.6 *L Cancelled 
 
  Hct (37 - 47 %) 19.2 *L Cancelled 
 
  MCV (81.0 - 99.0 FL) 103.9  H Cancelled 
 
  MCH (27.0 - 31.0 PG) 35.9  H Cancelled 
 
  MCHC (33.0 - 37.0 G/DL) 34.6 Cancelled 
 
  RDW (11.5 - 14.5 %) 22.0  H Cancelled 
 
  Plt Count (130 - 400 /CUMM) 153 Cancelled 
 
  MPV (7.4 - 10.4 FL) 7.2  L Cancelled 
 
  Gran % (42.2 - 75.2 %) 75.6  H  
 
  Lymphocytes % (20.5 - 51.1 %) 10.6  L  
 
  Monocytes % (1.7 - 9.3 %) 12.6  H  
 
  Eosinophils % (0 - 5 %) 0.8  
 
  Basophils % (0.0 - 2.0 %) 0.4  
 
  Absolute Granulocytes (1.4 - 6.5 /CUMM) 8.4  H  
 
  Absolute Lymphocytes (1.2 - 3.4 /CUMM) 1.2  
 
  Absolute Monocytes (0.10 - 0.60 /CUMM) 1.4  H  
 
  Absolute Eosinophils (0.0 - 0.7 /CUMM) 0.1  
 
  Absolute Basophils (0.0 - 0.2 /CUMM) 0  
 
 
 
 
 04/29 04/29 04/29
 
 0210 0200 0000
 
Chemistry   
 
  Sodium (137 - 145 mmol/L) 120  L  
 
  Potassium (3.5 - 5.1 mmol/L) 4.0  
 
  Chloride (98 - 107 mmol/L) 82  L  
 
  Carbon Dioxide (22 - 30 mmol/L) 27  
 
  Anion Gap (5 - 16) 11  
 
  BUN (7 - 17 mg/dL) 34  H  
 
  Creatinine (0.5 - 1.0 mg/dL) 1.3  H  
 
  Estimated GFR (>60 ml/min) 47  L  
 
  Glucose (65 - 99 mg/dL) 101  H  
 
  Calcium (8.4 - 10.2 mg/dL) 8.2  L  
 
  Phosphorus (2.5 - 4.5 mg/dL) 3.5  
 
  Magnesium (1.6 - 2.3 mg/dL) 1.9  
 
  Total Bilirubin (0.2 - 1.3 mg/dL) 6.3  H  
 
  AST (14 - 36 U/L) 91  H  
 
  ALT (9 - 52 U/L) 47  
 
  Lactate Dehydrogenase (313 - 618 U/L) 358  
 
  Albumin (3.5 - 5.0 g/dL) 3.2  L  
 
  Cortisol AM Sample (4.46 - 22.7 ug/dL) 16.5  
 
Hematology   
 
  CBC w Diff NO MAN DIFF REQ  Cancelled
 
  WBC (4.8 - 10.8 /CUMM) 9.6  Cancelled
 
  RBC (4.20 - 5.40 /CUMM) 1.30  L  Cancelled
 
  Hgb (12.0 - 16.0 G/DL) 5.1 *L  Cancelled
 
  Hct (37 - 47 %) 14.5 *L  Cancelled
 
  MCV (81.0 - 99.0 FL) 111.2  H  Cancelled
 
  MCH (27.0 - 31.0 PG) 39.5  H  Cancelled
 
  MCHC (33.0 - 37.0 G/DL) 35.5  Cancelled
 
  RDW (11.5 - 14.5 %) 14.0  Cancelled
 
  Plt Count (130 - 400 /CUMM) 144  Cancelled
 
  MPV (7.4 - 10.4 FL) 7.2  L  Cancelled
 
  Gran % (42.2 - 75.2 %) 75.4  H  
 
  Lymphocytes % (20.5 - 51.1 %) 12.9  L  
 
  Monocytes % (1.7 - 9.3 %) 10.7  H  
 
  Eosinophils % (0 - 5 %) 0.8  
 
  Basophils % (0.0 - 2.0 %) 0.2  
 
  Absolute Granulocytes (1.4 - 6.5 /CUMM) 7.2  H  
 
  Absolute Lymphocytes (1.2 - 3.4 /CUMM) 1.2  
 
  Absolute Monocytes (0.10 - 0.60 /CUMM) 1.0  H  
 
  Absolute Eosinophils (0.0 - 0.7 /CUMM) 0.1  
 
  Absolute Basophils (0.0 - 0.2 /CUMM) 0  
 
  Retic Count (0.5 - 2.0 %) 4.80  H  
 
  Haptoglobin  Cancelled 
 
 
 
 
 04/28 04/28
 
 2330 2207
 
Chemistry  
 
  Ammonia (9 - 30 umol/L)  37  H
 
Hematology  
 
  CBC w Diff NO MAN DIFF REQ 
 
  WBC (4.8 - 10.8 /CUMM) 13.2  H 
 
  RBC (4.20 - 5.40 /CUMM) 1.70  L 
 
  Hgb (12.0 - 16.0 G/DL) 6.8 *L 
 
  Hct (37 - 47 %) 19.2 *L 
 
  MCV (81.0 - 99.0 FL) 113.1  H 
 
  MCH (27.0 - 31.0 PG) 39.9  H 
 
  MCHC (33.0 - 37.0 G/DL) 35.3 
 
  RDW (11.5 - 14.5 %) 13.6 
 
  Plt Count (130 - 400 /CUMM) 187 
 
  MPV (7.4 - 10.4 FL) 7.7 
 
  Gran % (42.2 - 75.2 %) 79.5  H 
 
  Lymphocytes % (20.5 - 51.1 %) 8.9  L 
 
  Monocytes % (1.7 - 9.3 %) 11.0  H 
 
  Eosinophils % (0 - 5 %) 0.5 
 
  Basophils % (0.0 - 2.0 %) 0.1 
 
  Absolute Granulocytes (1.4 - 6.5 /CUMM) 10.5  H 
 
  Absolute Lymphocytes (1.2 - 3.4 /CUMM) 1.2 
 
  Absolute Monocytes (0.10 - 0.60 /CUMM) 1.4  H 
 
  Absolute Eosinophils (0.0 - 0.7 /CUMM) 0.1 
 
  Absolute Basophils (0.0 - 0.2 /CUMM) 0 
 
 
 
 
 04/28
 
 2151
 
Chemistry 
 
  Sodium (137 - 145 mmol/L) 119 *L
 
  Potassium (3.5 - 5.1 mmol/L) 4.3
 
  Chloride (98 - 107 mmol/L) 80  L
 
  Carbon Dioxide (22 - 30 mmol/L) 28
 
  Anion Gap (5 - 16) 11
 
  BUN (7 - 17 mg/dL) 37  H
 
  Creatinine (0.5 - 1.0 mg/dL) 1.2  H
 
  Estimated GFR (>60 ml/min) 51  L
 
  BUN/Creatinine Ratio (7 - 25 %) 30.8  H
 
  Iron (37 - 170 ug/dL) 39
 
  TIBC (265 - 497 ug/dL) 185  L
 
  Ferritin (6.24 - 137 ng/mL) 1120.0  H
 
  TSH &T3 &Free T4 Intrp (0.270 - 4.20 uIU/mL) 2.060

## 2018-05-01 NOTE — PN- HOUSESTAFF
Kassandra CORNEJO,Dudley 18 0840:
Subjective
Follow-up For:
Hyponatremia
Decompensated alcoholic cirrhosis
Ascites
Pancreatitis
Alcohol use disorder
BRETT
Tele-Events Since Last Visit:
Off telemetry
Subjective:
Patient was seen and examined at bedside.  She was resting comfortably.  She had
no acute events overnight.  She reports that her abdominal distention is mildly 
improved.  She continues to have a nonproductive cough.  She had an episode of 
emesis last night which she believes is secondary to coughing.  Feels a 
complaining of mild back pain, which she claims is chronic.  She denies any 
chest pain, shortness breath, palpitations, fever, chills, diarrhea, bloody or 
melanotic stools.
 
Review of Systems
Constitutional:
Denies: chills, fever. 
Cardiovascular:
Reports: no symptoms. 
Respiratory:
Reports: cough.  Denies: short of breath. 
Gastrointestinal:
Reports: vomiting. 
Genitourinary:
Reports: no symptoms. 
Musculoskeletal:
Reports: no symptoms. 
 
Objective
Last 24 Hrs of Vital Signs/I&O
 Vital Signs
 
 
Date Time Temp Pulse Resp B/P B/P Pulse O2 O2 Flow FiO2
 
     Mean Ox Delivery Rate 
 
 0800       Room Air  
 
 0800 98.8 92 16 102/46     
 
 0705 98.8 92 16 102/46  94 Room Air  
 
 2215 99.3 78 20 112/70  92   
 
 1600       Room Air  
 
 1600 98.1 78 18 132/70     
 
 1529 98.1 78 16 132/70  97   
 
/ 1000  75 18 115/72     
 
 
 Intake & Output
 
 
  1600  0800  0000
 
Intake Total  130 493
 
Output Total   25
 
Balance  130 468
 
    
 
Intake, IV  100 343
 
Intake, Oral  30 150
 
Output,   25
 
Emesis   
 
Patient   129 lb
 
Weight   
 
 
 
 
Physical Exam
General Appearance: Alert, Oriented X3, Cooperative
Sepsis Skin Exam (color): Jaundiced
HEENT: scleral icterus
Cardiovascular: Regular Rate, Normal S1, Normal S2
Lungs: Clear to Auscultation, Normal Air Movement
Abdomen: abdomen is distended 
Neurological: Normal Speech, Normal Tone, Sensation Intact
Extremities: No Clubbing, No Cyanosis, No Edema, largy eccymosis on the RUE
Current Medications:
 Current Medications
 
 
  Sig/Reagan Start time  Last
 
Medication Dose Route Stop Time Status Admin
 
Albumin Human 25 GM Q8H  0756 AC 
 
  IV   0754
 
Folic Acid 1 MG DAILY  09 AC 
 
  PO   0755
 
Heparin Sodium  5,000 UNIT Q8  2200 AC 
 
(Porcine)  SC   0544
 
Lorazepam 0 Q1P PRN 2000 AC 
 
  IV   203
 
Midodrine 10 MG 0800,1200,1600  0800 AC 
 
  PO   0755
 
Nicotine 14 MG DAILY AS NEEDED PRN  0730 AC 
 
  TOP   0835
 
Octreotide Acetate 100 MCG TID  09 AC 
 
  SC   0755
 
Ondansetron HCl 4 MG ONCE ONE  1700 DC 
 
  IV  1701  1701
 
Oxycodone HCl 5 MG BID PRN  0245 AC 
 
  PO   0520
 
Pantoprazole Sodium 40 MG BID  09 AC 
 
  IV   0755
 
Thiamine HCl 100 MG 1800  1800 AC 
 
Sodium Chloride 50 ML IV   1702
 
 
 
 
Assessment/Plan
Assessment:
Patient is a 32-year-old female with PMH significant for alcohol use disorder 
who presented due to hyponatremia.
 
#Acute blood loss anemia secondary to GI bleed
Patient was transfused 3 units PRBCs so far on this admission, and responded 
appropriately.  She had Hemoccult positive stool.  H/H has remained stable over 
the past 2 days
-Plan for EGD tomorrow morning
-We'll continue to monitor CBC with a goal hemoglobin above 8
 
#Hyponatremia
Hypervolemic hypoosmolar hyponatremia likely secondary to decompensated 
alcoholic cirrhosis and decreased intravascular volume.
-We'll continue to monitor BEP daily
-Continue 500 ML fluid restriction
 
#Decompensated alcoholic liver cirrhosis
SBP was ruled out with diagnostic paracentesis, worsening coagulopathy with 
increasing INR
-Continue to monitor INR
-GI recommendations appreciated
-Follow-up EGD results
 
#BRETT
Renal ultrasound was rule out obstruction.  Creatinine remains elevated
-Nephrology recommendations appreciated
-Continue albumin 25 mg 3 times a day
-Continue octreotide and midodrine
 
#Alcohol use disorder
-Continue CIWA protocol
 
Diet: Regular diet, nothing by mouth at midnight for EGD
DVT prophylaxis: Subcutaneous heparin, Alps
CODE STATUS: Full code
Problem List:
 1. Anemia
 
 2. Pancreatitis
 
 3. Ascites due to alcoholic cirrhosis
 
 4. Hyponatremia
 
Pain Ratin
Pain Location:
back
Pain Goal: Remain pain free
Pain Plan:
pain pathway
Tomorrow's Labs & Rationales:
Na BID, bep, cbc
 
 
Quinn CORNEJO,Kianaalondra 18 1156:
Attending MD Review Statement
 
Attending Statement
Attending MD Statement: examined this patient, discuss w/resident/PA/NP, agreed 
w/resident/PA/NP, reviewed EMR data (avail)
Attending Assessment/Plan:
32F PMH alcohol use disorder admitted with sodium level 117, found to have 
alcoholic cirrhosis with ascites and elevated creatinine concerning for 
hepatorenal syndrome.
 
No complaints, doing well, sodium improving, creatinine stable.
 
1. Decompensated alcoholic cirrhosis with ascites
2. Hyponatremia
3. BRETT
4. Alcohol use disorder
 
Plan
- Does not require telemetry at this time
- Follow GI recommendations
- Daily monitoring of sodium (not q4h)
- Will continue albumin, octreotide, and midodrine for now, will discuss 
stopping these with GI
- Re-evaluate need for Percocet
- DVT PPx
- Will speak with GI regarding potential discharge date

## 2018-05-02 VITALS — DIASTOLIC BLOOD PRESSURE: 60 MMHG | SYSTOLIC BLOOD PRESSURE: 108 MMHG

## 2018-05-02 VITALS — DIASTOLIC BLOOD PRESSURE: 62 MMHG | SYSTOLIC BLOOD PRESSURE: 102 MMHG

## 2018-05-02 VITALS — SYSTOLIC BLOOD PRESSURE: 112 MMHG | DIASTOLIC BLOOD PRESSURE: 80 MMHG

## 2018-05-02 LAB
ABSOLUTE GRANULOCYTE CT: 5.7 /CUMM (ref 1.4–6.5)
BASOPHILS # BLD: 0 /CUMM (ref 0–0.2)
BASOPHILS NFR BLD: 0.1 % (ref 0–2)
EOSINOPHIL # BLD: 0.1 /CUMM (ref 0–0.7)
EOSINOPHIL NFR BLD: 1.2 % (ref 0–5)
ERYTHROCYTE [DISTWIDTH] IN BLOOD BY AUTOMATED COUNT: 21.6 % (ref 11.5–14.5)
GRANULOCYTES NFR BLD: 67.7 % (ref 42.2–75.2)
HCT VFR BLD CALC: 24.3 % (ref 37–47)
LYMPHOCYTES # BLD: 1.4 /CUMM (ref 1.2–3.4)
MCH RBC QN AUTO: 35.6 PG (ref 27–31)
MCHC RBC AUTO-ENTMCNC: 33.8 G/DL (ref 33–37)
MCV RBC AUTO: 105.4 FL (ref 81–99)
MONOCYTES # BLD: 1.2 /CUMM (ref 0.1–0.6)
PLATELET # BLD: 140 /CUMM (ref 130–400)
PMV BLD AUTO: 7.7 FL (ref 7.4–10.4)
PROTHROMBIN TIME: 19.5 SEC (ref 9.4–12.5)
RED BLOOD CELL CT: 2.3 /CUMM (ref 4.2–5.4)
WBC # BLD AUTO: 8.5 /CUMM (ref 4.8–10.8)

## 2018-05-02 PROCEDURE — 0DD68ZX EXTRACTION OF STOMACH, VIA NATURAL OR ARTIFICIAL OPENING ENDOSCOPIC, DIAGNOSTIC: ICD-10-PCS

## 2018-05-02 NOTE — PN- HOUSESTAFF
Kassandra CORNEJO,[place] 18 0704:
Subjective
Follow-up For:
Hyponatremia
Decompensated alcoholic cirrhosis
Ascites
Pancreatitis
Alcohol use disorder
BRETT
Subjective:
Patient was seen and examined at bedside.  She was resting comfortably.  She had
no acute events overnight.  She has no new complaints, is able to ambulate to 
the restroom without issues.  Continues to have abdominal distention which she 
believes is improving.
 
Review of Systems
Constitutional:
Denies: chills, fever. 
EENTM:
Reports: no symptoms. 
Cardiovascular:
Reports: no symptoms. 
Respiratory:
Reports: no symptoms. 
Gastrointestinal:
Reports: no symptoms. 
Genitourinary:
Reports: no symptoms. 
Musculoskeletal:
Reports: no symptoms. 
 
Objective
Last 24 Hrs of Vital Signs/I&O
 Vital Signs
 
 
Date Time Temp Pulse Resp B/P B/P Pulse O2 O2 Flow FiO2
 
     Mean Ox Delivery Rate 
 
/ 0638 98.8 78 18 108/60  97 Room Air  
 
05/ 2300 98.6 90 18 110/58  93 Room Air  
 
05/ 2200 98.6 90 18 110/78     
 
05/01 1800  83 16 110/58     
 
05/01 1600 98.1 83 18 110/58     
 
05/01 1400  84 16 110/58     
 
05/01 1337 98.1 83 18 110/58  98 Room Air  
 
05/01 1200  92 16 110/62     
 
05/01 1139    110/62     
 
05/01 1000 98.8 92 16 102/46     
 
05/01 0800       Room Air  
 
05/01 0800 98.8 92 16 102/46     
 
05/01 0705 98.8 92 16 102/46  94 Room Air  
 
 
 Intake & Output
 
 
 / 0800 05/02 0000 05/01 1600
 
Intake Total  281 370
 
Output Total   
 
Balance  281 370
 
    
 
Intake, IV  181 130
 
Intake, Oral  100 240
 
Number  1 
 
Bowel   
 
Movements   
 
Patient  130 lb 
 
Weight   
 
Weight  Bed scale 
 
Measurement   
 
Method   
 
 
 
 
Physical Exam
General Appearance: Alert, Oriented X3, Cooperative, No Acute Distress
Sepsis Skin Exam (color): Jaundiced
HEENT: scleral icterus
Cardiovascular: Regular Rate, Normal S1, Normal S2
Lungs: Clear to Auscultation, Normal Air Movement
Abdomen: Normal Bowel Sounds, Soft, significant abdominal distension, unchanged 
from yesterday
Neurological: Normal Speech, Normal Tone, Sensation Intact
Extremities: No Clubbing, No Cyanosis, No Edema
Current Medications:
 Current Medications
 
 
  Sig/Reagan Start time  Last
 
Medication Dose Route Stop Time Status Admin
 
Albumin Human 25 GM Q8H  0756 AC 05/02
 
  IV   0014
 
Folic Acid 1 MG DAILY  09 AC 
 
  PO   0755
 
Heparin Sodium  5,000 UNIT Q8 2200 AC 
 
(Porcine)  SC   0556
 
Lorazepam 0 Q1P PRN 2000 AC 
 
  IV   2221
 
Midodrine 10 MG 0800,1200,1600  08 AC 
 
  PO   1559
 
Nicotine 14 MG DAILY AS NEEDED PRN  0730 AC 
 
  TOP   0835
 
Octreotide Acetate 100 MCG TID  09 AC 
 
  SC   2206
 
Oxycodone HCl 5 MG BID PRN  0245 AC 
 
  PO   1558
 
Pantoprazole Sodium 40 MG  AC 
 
  IV   220
 
Thiamine HCl 100 MG 1800  1800 AC 
 
Sodium Chloride 50 ML IV   1700
 
 
 
 
Last 24 Hrs of Lab/Raghav Results
Last 24 Hrs of Labs/Mics:
 Laboratory Tests
 
18 0620:
Anion Gap 11, Estimated GFR 47  L, BUN/Creatinine Ratio 30.0  H, Total Bilirubin
9.7  H, Direct Bilirubin 6.2  H, AST 70  H, ALT 34, Alkaline Phosphatase 136  H,
Total Protein 6.0  L, Albumin 3.6, PT 19.5  H, INR 1.78  H, CBC w Diff NO MAN 
DIFF REQ, RBC 2.30  L, .4  H, MCH 35.6  H, MCHC 33.8, RDW 21.6  H, MPV 
7.7, Gran % 67.7, Lymphocytes % 16.4  L, Monocytes % 14.6  H, Eosinophils % 1.2,
Basophils % 0.1, Absolute Granulocytes 5.7, Absolute Lymphocytes 1.4, Absolute 
Monocytes 1.2  H, Absolute Eosinophils 0.1, Absolute Basophils 0
 
18 1800:
Sodium Cancelled
 
 
Assessment/Plan
Assessment:
Patient is a 32-year-old female with PMH significant for alcohol use disorder 
who presented due to hyponatremia.
 
#Acute blood loss anemia secondary to GI bleed
EGD today showed portal hypertensive gastropathy, grade 1 esophageal varices and
duodenitis.  
- No active bleeding seen on EGD
-We'll continue to monitor CBC with a goal hemoglobin above 8
 
#Hyponatremia
Hypervolemic hypoosmolar hyponatremia likely secondary to decompensated 
alcoholic cirrhosis and decreased intravascular volume.  Sodium continues to 
improve
-continue to monitor BEP daily
-Continue 500 ML fluid restriction
 
#Decompensated alcoholic liver cirrhosis
SBP was ruled out with diagnostic paracentesis, INR has remained stable today.  
Alpha-fetoprotein was found to be within normal limits
-Continue to monitor INR
-GI recommendations appreciated
 
#BRETT secondary to hepatorenal syndrome
Renal ultrasound was rule out obstruction.  Creatinine remains stable but 
elevated
-Nephrology recommendations appreciated
-Continue albumin 25 mg 3 times a day
-Continue octreotide and midodrine
 
#Alcohol use disorder
-Continue CIWA protocol
 
Diet: Regular diet, nothing by mouth at midnight for EGD
DVT prophylaxis: Subcutaneous heparin, Alps
CODE STATUS: Full code
Problem List:
 1. Anemia
 
 2. Ascites due to alcoholic cirrhosis
 
 3. Hyponatremia
 
Pain Ratin
Pain Location:
none
Pain Goal: Remain pain free
Pain Plan:
pain pathway
Tomorrow's Labs & Rationales:
cbc, bep, INR
 
 
Jacinta Ball MD 18 1017:
Attending MD Review Statement
 
Attending Statement
Attending MD Statement: examined this patient, discuss w/resident/PA/NP, agreed 
w/resident/PA/NP, reviewed EMR data (avail)
Attending Assessment/Plan:
32F PMH alcohol use disorder admitted with sodium level 117, found to have 
alcoholic cirrhosis with ascites and elevated creatinine concerning for 
hepatorenal syndrome.
 
No complaints, doing well, sodium improving, creatinine stable.
 
1. Decompensated alcoholic cirrhosis with ascites
2. Hyponatremia
3. BRETT
4. Alcohol use disorder
 
Plan
- Does not require telemetry at this time
- Going for EGD today
- Follow GI recommendations
- Daily monitoring of sodium
- Will continue albumin, octreotide, and midodrine for now, will discuss 
stopping these with GI
- Re-evaluate need for Percocet
- DVT PPx

## 2018-05-02 NOTE — PROC NOTE ENDOSCOPY
Endoscopy Procedure
Medical History: unchanged
Mental Status: alert/oriented
Heart/Lung Eval Prior to Sedation: within normal limits
Candidate for Sedation? Yes
Procedure Date: 05/02/18
Procedure Type: EGD w/biopsy
:
MD Walters Deborah E.
 
ASA Classification: III
Indications:
1.  Chronic blood loss anemia
2.  Cirrhosis
 
Instrument: diagnostic gastroscope
Meds Received: MAC
Patient's Tolerance: good
Complications: none
Extent Reached: second part of duodenum
Procedure:
Note: Informed consent was obtained prior to procedure.  Risks and benefits of 
procedure were discussed with patient.  Potential complications discussed 
included perforation, bleeding, abdominal pain, and adverse reaction to 
medications.  It was explained that iany or all of these complications could 
result in the need for extended hospitalization, emergency surgery, transfusion 
of packed red blood cells (with the risk of HIV or hepatitis virus), intubation 
with mechanical ventilation, and possible need for antibiotics.  It was further 
explained that an existing tumor polyp or mucosal abnormality might not be 
identified at the time of the procedure thus resulting in a missed opportunity 
for early diagnosis and treatment of a gastrointestinal malignancy or disease 
with possible interval development of a gastrointestinal cancer or other disease
with possible worsening of clinical condition in the interval between 
endoscopies.  It was also discussed that complications are not limited to those 
listed above.  Possible alternatives to endoscopic treatment or evaluation were 
discussed.  All questions were answered.
 
Continuous EKG and blood pressure monitors were attached.  Supplemental oxygen 
was provided with O2 Sat monitoring.  
 
Patient was placed in the left lateral decubitus position.  A surgical timeout 
was performed.  All persons in the room were identified.  All concerns were 
expressed and answered.
 
A bite block was placed in the mouth and sedation was administered by anesthesia
and titrated to comfort prior to starting the procedure.  The Olympus upper 
endoscope was advanced under direct vision to the level of the third portion of 
the duodenum.
 
Esophagus:  The GE junction was identified and was normal.  The Z line was 
located at 35 cm from the incisors.  There were grade 1 esophageal varices with 
stigmata of bleeding at the GE junction.  These extended to 30 cm from the 
incisors and completely effaced with air insufflation.  There was marked 
thinning of esophageal mucosa over the esophageal varices.
 
Stomach: The stomach had diffuse erythema with erosion.  There was mild 
nodularity in the antrum and prepyloric region with erosion..  Retroflexed view 
of the cardiofundic region revealed a snake-skinned reticulated pattern with 
hemorrhagic spots consistent with severe portal hypertensive gastropathy.  There
were no gastric varices.  The GE junction was seen in retroflexion and cherry 
red spots and red whale sign were noted at the GE junction.  There were normal 
rugae and normal distensibility.  The pylorus was patent and easily intubated.  
Biopsies were obtained from the antrum, angularis, gastric body and lesser 
curvature to rule out H. Pylori.
 
Duodenum: The duodenum was fully examined from bulb down to the third portion.  
There was patchy erythema and erosion throughout the entire visualized 
duodenum..
 
With the endoscope in the forward-viewing position, it was slowly withdrawn and 
all areas were re-inspected and findings are as described previously.  Patient 
tolerated the procedure well.
 
EBL: Minimal
 
Specimens Removed:  antrum, angularis, gastric body and lesser curvature to rule
out H. Pylori.
 
Findings:
1.  Portal hypertensive gastropathy with hemorrhagic spots
2.  Duodenitis
3.  Grade 1 esophageal varices with the stigmata of bleeding.  Varices were 
small and had complete effacement with air insufflation.
 
 
Impression:
1.  Portal hypertensive gastropathy with hemorrhagic spots
2.  Duodenitis
3.  Grade 1 esophageal varices with the stigmata of bleeding.  Varices were 
small and showed complete effacement with air insufflation.
 
Recommendations:
1.  Await pathology
2.  Continue to follow serial H&H
3.  Will consider banding in the future.  At this time as varices are quite 
small and mucosa is quite thinned believe them ligation is not appropriate.
4.  Given patient's hepatorenal syndrome as well as ascites patient is not a 
current candidate for beta blockers.
5.  Continue octreotide, Midrodrine, and albumin

## 2018-05-03 VITALS — DIASTOLIC BLOOD PRESSURE: 64 MMHG | SYSTOLIC BLOOD PRESSURE: 114 MMHG

## 2018-05-03 VITALS — DIASTOLIC BLOOD PRESSURE: 74 MMHG | SYSTOLIC BLOOD PRESSURE: 100 MMHG

## 2018-05-03 VITALS — DIASTOLIC BLOOD PRESSURE: 68 MMHG | SYSTOLIC BLOOD PRESSURE: 116 MMHG

## 2018-05-03 LAB
ABSOLUTE GRANULOCYTE CT: 5 /CUMM (ref 1.4–6.5)
BASOPHILS # BLD: 0.1 /CUMM (ref 0–0.2)
BASOPHILS NFR BLD: 0.7 % (ref 0–2)
EOSINOPHIL # BLD: 0.1 /CUMM (ref 0–0.7)
EOSINOPHIL NFR BLD: 1.3 % (ref 0–5)
ERYTHROCYTE [DISTWIDTH] IN BLOOD BY AUTOMATED COUNT: 20.8 % (ref 11.5–14.5)
GRANULOCYTES NFR BLD: 64.9 % (ref 42.2–75.2)
HCT VFR BLD CALC: 24.7 % (ref 37–47)
LYMPHOCYTES # BLD: 1.5 /CUMM (ref 1.2–3.4)
MCH RBC QN AUTO: 35.7 PG (ref 27–31)
MCHC RBC AUTO-ENTMCNC: 34 G/DL (ref 33–37)
MCV RBC AUTO: 104.8 FL (ref 81–99)
MONOCYTES # BLD: 1.1 /CUMM (ref 0.1–0.6)
PLATELET # BLD: 135 /CUMM (ref 130–400)
PMV BLD AUTO: 7.8 FL (ref 7.4–10.4)
PROTHROMBIN TIME: 19.2 SEC (ref 9.4–12.5)
RED BLOOD CELL CT: 2.36 /CUMM (ref 4.2–5.4)
WBC # BLD AUTO: 7.7 /CUMM (ref 4.8–10.8)

## 2018-05-03 NOTE — PN- HOUSESTAFF
Objective
Last 24 Hrs of Vital Signs/I&O
 Vital Signs
 
 
Date Time Temp Pulse Resp B/P B/P Pulse O2 O2 Flow FiO2
 
     Mean Ox Delivery Rate 
 
05/03 0653 98.7 89 18 116/68  99 Room Air  
 
05/02 2231 99.1 97 18 112/80  98   
 
05/02 1500 98.3 60 20 102/62  96   
 
 
 Intake & Output
 
 
 05/03 0800 05/03 0000 05/02 1600
 
Intake Total 210 300 370
 
Output Total   300
 
Balance 210 300 70
 
    
 
Intake,   120
 
Intake, Oral 100 300 250
 
Number  2 0
 
Bowel   
 
Movements   
 
Output, Urine   300
 
Patient  130 lb 
 
Weight   
 
 
 
Current Medications:
 Current Medications
 
 
  Sig/Reagan Start time  Last
 
Medication Dose Route Stop Time Status Admin
 
Albumin Human 25 GM Q8H 04/29 0756  05/03
 
  IV   0039
 
Chlorhexidine  1 GM .STK-MED ONE 05/02 1223 DC 
 
Gluconate  TOP 05/02 1224  
 
Folic Acid 1 MG DAILY 04/29 0900  05/02
 
  PO   1314
 
Heparin Sodium  5,000 UNIT Q8 04/30 2200  05/03
 
(Porcine)  SC   0607
 
Lorazepam 0 Q1P PRN 04/28 2000  05/02
 
  IV   2259
 
Midodrine 10 MG 0800,1200,1600 04/29 0800  05/02
 
  PO   1800
 
Nicotine 14 MG DAILY AS NEEDED PRN 04/29 0730  05/02
 
  TOP   1314
 
Octreotide Acetate 100 MCG TID 04/29 0900  05/02
 
  SC   2200
 
Oxycodone HCl 5 MG BID PRN 04/29 0245  05/03
 
  PO   0607
 
Pantoprazole Sodium 40 MG BID 04/29 0900  05/02
 
  IV   2243
 
Patient Medication  1 ED ONE ONE 05/02 1100 DC 
 
Teaching  ED 05/02 1101  
 
Thiamine HCl 100 MG 1800 04/29 1800  05/02
 
Sodium Chloride 50 ML IV   1632

## 2018-05-03 NOTE — PN- GASTROENTEROLOGY
Assessment/Plan GI
Assessment/Recommendations:
ASESSMENT:
1. Alcoholic Cirrhodis
2.  Hepatorenal Syndrome -- still on midrodrine, octreotide, and albumin with 
decrease in creatinine to 1.3.
3.  Hyponatremia -- improved to 130.
4.  Anemia -- multifactorial likely due to chronic losses from portal 
hypertensive gastropathy and varices
5.  Hypoalbuminemia -- improved
6.  Alcohol Depenence
7.  Esophageal Varices
8.  Portal Hypertensive Gastropathy
9.  Coagulopathy PT/INR now trending downwards.
 
RECOMMENDATIONS:
1.  Continue octreotide, albumin and middrodrine.  Will discuss discontinuation 
of these medications with nephrology.  Patient will need diuretics for 
management of ascites but concerned that she may not be able to tolerate them 
given underlying CKD.
2.  Cannot discharge to home as long as PT/INR is increasing.  Once PT/INR is 
stabilized or trending downwards can discharge to home with close follow-up with
Dr. Wolfgang Caceres.
3.  Would give trial of subcutaneous or IV vitamin K although is unlikely to 
help given end-stage liver disease.
 
 
 
 
 
Subjective
Subjective:
Patient remained stable.  She has had no nausea, vomiting, or abdominal pain and
denies melena.
 
Objective
Vital Signs and I&Os
Vital Signs
 
 
Date Time Temp Pulse Resp B/P B/P Pulse O2 O2 Flow FiO2
 
     Mean Ox Delivery Rate 
 
05/03 0653 98.7 89 18 116/68  99 Room Air  
 
05/02 2231 99.1 97 18 112/80  98   
 
05/02 1500 98.3 60 20 102/62  96   
 
 
 Intake & Output
 
 
 05/03 1600 05/03 0400 05/02 1600 05/02 0400 05/01 1600 05/01 0400
 
Intake Total 210 300 370 281 500 493
 
Output Total   300   25
 
Balance 210 300 70 281 500 468
 
       
 
Intake,   120 181 230 343
 
Intake, Oral 100 300 250 100 270 150
 
Number  2 0 1  
 
Bowel      
 
Movements      
 
Output,      25
 
Emesis      
 
Output, Urine   300   
 
Patient  130 lb  130 lb  129 lb
 
Weight      
 
Weight    Bed scale  
 
Measurement      
 
Method      
 
 
 
 
Physical Exam
General Appearance: no apparent distress, alert, awake
Head: atraumatic, normal appearance
Cardiovascular: regular rate/rhythm, normal S1 and S2 without rub murmur or 
gallop
Abdomen: normal bowel sounds, soft, non-tender, distention
Neurologic/Psychiatric: oriented x 3, normal mood/affect
Skin: jaundice
Current Medications:
 Current Medications
 
 
  Sig/Reagan Start time  Last
 
Medication Dose Route Stop Time Status Admin
 
Albumin Human 25 GM Q8H 04/29 0756 AC 05/03
 
  IV   0843
 
Chlorhexidine  1 GM .STK-MED ONE 05/02 1223 DC 
 
Gluconate  TOP 05/02 1224  
 
Folic Acid 1 MG DAILY 04/29 0900  05/02
 
  PO   1314
 
Heparin Sodium  5,000 UNIT Q8 04/30 2200 AC 05/03
 
(Porcine)  SC   0607
 
Lorazepam 0 Q1P PRN 04/28 2000 DC 05/02
 
  IV   2259
 
Midodrine 10 MG 0800,1200,1600 04/29 0800 AC 05/03
 
  PO   0833
 
Nicotine 14 MG DAILY AS NEEDED PRN 04/29 0730 AC 05/02
 
  TOP   1314
 
Octreotide Acetate 100 MCG TID 04/29 0900 AC 05/03
 
  SC   0833
 
Omeprazole 40 MG DAILY AC 05/03 0806 AC 
 
  PO   
 
Oxycodone HCl 5 MG BID PRN 04/29 0245 AC 05/03
 
  PO   0607
 
Pantoprazole Sodium 40 MG BID 04/29 0900 DC 05/02
 
  IV   2243
 
Patient Medication  1 ED ONE ONE 05/02 1100 DC 
 
Teaching  ED 05/02 1101  
 
Potassium Chloride 40 MEQ ONCE ONE 05/03 0915 DC 
 
  PO 05/03 0916  
 
Thiamine HCl 100 MG 1800 04/29 1800 AC 05/02
 
Sodium Chloride 50 ML IV   1632
 
 
 
 
Results
Pertinent Lab Results:
 Laboratory Tests
 
 
 05/03 05/02
 
 0638 0620
 
Chemistry  
 
  Sodium (137 - 145 mmol/L) 134  L 130  L
 
  Potassium (3.5 - 5.1 mmol/L) 3.4  L 3.6
 
  Chloride (98 - 107 mmol/L) 92  L 90  L
 
  Carbon Dioxide (22 - 30 mmol/L) 28 29
 
  Anion Gap (5 - 16) 14 11
 
  BUN (7 - 17 mg/dL) 38  H 39  H
 
  Creatinine (0.5 - 1.0 mg/dL) 1.3  H 1.3  H
 
  Estimated GFR (>60 ml/min) 47  L 47  L
 
  BUN/Creatinine Ratio (7 - 25 %) 29.2  H 30.0  H
 
  Total Bilirubin (0.2 - 1.3 mg/dL)  9.7  H
 
  Direct Bilirubin (< 0.4 mg/dL)  6.2  H
 
  AST (14 - 36 U/L)  70  H
 
  ALT (9 - 52 U/L)  34
 
  Alkaline Phosphatase (<127 U/L)  136  H
 
  Total Protein (6.3 - 8.2 g/dL)  6.0  L
 
  Albumin (3.5 - 5.0 g/dL)  3.6
 
Coagulation  
 
  PT (9.4 - 12.5 SEC) 19.2  H 19.5  H
 
  INR (0.90 - 1.19) 1.75  H 1.78  H
 
Hematology  
 
  CBC w Diff NO MAN DIFF REQ NO MAN DIFF REQ
 
  WBC (4.8 - 10.8 /CUMM) 7.7 8.5
 
  RBC (4.20 - 5.40 /CUMM) 2.36  L 2.30  L
 
  Hgb (12.0 - 16.0 G/DL) 8.4  L 8.2  L
 
  Hct (37 - 47 %) 24.7  L 24.3  L
 
  MCV (81.0 - 99.0 FL) 104.8  H 105.4  H
 
  MCH (27.0 - 31.0 PG) 35.7  H 35.6  H
 
  MCHC (33.0 - 37.0 G/DL) 34.0 33.8
 
  RDW (11.5 - 14.5 %) 20.8  H 21.6  H
 
  Plt Count (130 - 400 /CUMM) 135 140
 
  MPV (7.4 - 10.4 FL) 7.8 7.7
 
  Gran % (42.2 - 75.2 %) 64.9 67.7
 
  Lymphocytes % (20.5 - 51.1 %) 19.0  L 16.4  L
 
  Monocytes % (1.7 - 9.3 %) 14.1  H 14.6  H
 
  Eosinophils % (0 - 5 %) 1.3 1.2
 
  Basophils % (0.0 - 2.0 %) 0.7 0.1
 
  Absolute Granulocytes (1.4 - 6.5 /CUMM) 5.0 5.7
 
  Absolute Lymphocytes (1.2 - 3.4 /CUMM) 1.5 1.4
 
  Absolute Monocytes (0.10 - 0.60 /CUMM) 1.1  H 1.2  H
 
  Absolute Eosinophils (0.0 - 0.7 /CUMM) 0.1 0.1
 
  Absolute Basophils (0.0 - 0.2 /CUMM) 0.1 0
 
 
 
 
 05/01 05/01 05/01
 
 1800 1030 0615
 
Chemistry   
 
  Sodium Cancelled  
 
  Alpha Fetoprotein   Cancelled
 
Coagulation   
 
  PT (9.4 - 12.5 SEC)  19.5  H 
 
  INR (0.90 - 1.19)  1.78  H 
 
 
 
 
 05/01 05/01 04/30 04/30
 
 0615 0145 2300 1800
 
Chemistry    
 
  Sodium (137 - 145 mmol/L) 128  L 129  L 128  L 129  L
 
  Potassium (3.5 - 5.1 mmol/L) 3.8   
 
  Chloride (98 - 107 mmol/L) 89  L   
 
  Carbon Dioxide (22 - 30 mmol/L) 27   
 
  Anion Gap (5 - 16) 12   
 
  BUN (7 - 17 mg/dL) 39  H   
 
  Creatinine (0.5 - 1.0 mg/dL) 1.2  H   
 
  Estimated GFR (>60 ml/min) 51  L   
 
  BUN/Creatinine Ratio (7 - 25 %) 32.5  H   
 
  Total Bilirubin (0.2 - 1.3 mg/dL) 9.6  H   
 
  Direct Bilirubin (< 0.4 mg/dL) 6.3  H   
 
  AST (14 - 36 U/L) 87  H   
 
  ALT (9 - 52 U/L) 40   
 
  Alkaline Phosphatase (<127 U/L) 155  H   
 
  Total Protein (6.3 - 8.2 g/dL) 5.8  L   
 
  Albumin (3.5 - 5.0 g/dL) 3.5   
 
Hematology    
 
  CBC w Diff NO MAN DIFF REQ   
 
  WBC (4.8 - 10.8 /CUMM) 9.6   
 
  RBC (4.20 - 5.40 /CUMM) 2.43  L   
 
  Hgb (12.0 - 16.0 G/DL) 8.6  L   
 
  Hct (37 - 47 %) 25.1  L   
 
  MCV (81.0 - 99.0 FL) 103.2  H   
 
  MCH (27.0 - 31.0 PG) 35.4  H   
 
  MCHC (33.0 - 37.0 G/DL) 34.3   
 
  RDW (11.5 - 14.5 %) 21.6  H   
 
  Plt Count (130 - 400 /CUMM) 145   
 
  MPV (7.4 - 10.4 FL) 7.7   
 
  Gran % (42.2 - 75.2 %) 74.8   
 
  Lymphocytes % (20.5 - 51.1 %) 12.8  L   
 
  Monocytes % (1.7 - 9.3 %) 11.2  H   
 
  Eosinophils % (0 - 5 %) 0.9   
 
  Basophils % (0.0 - 2.0 %) 0.3   
 
  Absolute Granulocytes (1.4 - 6.5 /CUMM) 7.2  H   
 
  Absolute Lymphocytes (1.2 - 3.4 /CUMM) 1.2   
 
  Absolute Monocytes (0.10 - 0.60 /CUMM) 1.1  H   
 
  Absolute Eosinophils (0.0 - 0.7 /CUMM) 0.1   
 
  Absolute Basophils (0.0 - 0.2 /CUMM) 0   
 
 
 
 
 04/30
 
 1410
 
Chemistry 
 
  Sodium (137 - 145 mmol/L) 124  L

## 2018-05-03 NOTE — PN- HOUSESTAFF
Kassandra CORNEJO,Dud[place] 18 0714:
Subjective
Follow-up For:
Hepatorenal syndrome
Alcoholic cirrhosis
Esophageal varices
Hyponatremia
Subjective:
Patient was seen and examined at bedside.  She was asleep.  She had no acute 
events overnight.  She was given 2 mg IV Ativan overnight despite scoring 0 on 
CIWA.  Patient is not lethargic and refusing to answer questions.  She will open
her eyes to verbal stimuli but quickly closes them and falls back asleep.  
Review of systems is unable to be obtained at this time.
 
Review of Systems
Constitutional:
Reports: see HPI. 
 
Objective
Last 24 Hrs of Vital Signs/I&O
 Vital Signs
 
 
Date Time Temp Pulse Resp B/P B/P Pulse O2 O2 Flow FiO2
 
     Mean Ox Delivery Rate 
 
 0653 98.7 89 18 116/68  99 Room Air  
 
 2231 99.1 97 18 112/80  98   
 
 1500 98.3 60 20 102/62  96   
 
 
 Intake & Output
 
 
  0800  0000  1600
 
Intake Total 210 300 370
 
Output Total   300
 
Balance 210 300 70
 
    
 
Intake,   120
 
Intake, Oral 100 300 250
 
Number  2 0
 
Bowel   
 
Movements   
 
Output, Urine   300
 
Patient  130 lb 
 
Weight   
 
 
 
 
Physical Exam
General Appearance: Cooperative, No Acute Distress, lethargic but arousable
Skin Temp/Moisture Exam: Warm/Dry
Sepsis Skin Exam (color): Jaundiced
HEENT: scleral icterus
Cardiovascular: Regular Rate, Normal S1, Normal S2
Lungs: Clear to Auscultation, Normal Air Movement
Abdomen: distended 
Neurological: Normal Speech, Normal Tone, Sensation Intact
Extremities: No Clubbing, No Cyanosis, No Edema
Current Medications:
 Current Medications
 
 
  Sig/Reagan Start time  Last
 
Medication Dose Route Stop Time Status Admin
 
Albumin Human 25 GM Q8H  0756 AC 
 
  IV   0039
 
Chlorhexidine  1 GM .STK-MED ONE  1223 DC 
 
Gluconate  TOP  1224  
 
Folic Acid 1 MG DAILY  09 AC 
 
  PO   1314
 
Heparin Sodium  5,000 UNIT Q8  2200 AC 
 
(Porcine)  SC   0607
 
Lorazepam 0 Q1P PRN 2000 AC 
 
  IV   2259
 
Midodrine 10 MG 0800,1200,1600  0800 AC 
 
  PO   1800
 
Nicotine 14 MG DAILY AS NEEDED PRN  0730 AC 
 
  TOP   1314
 
Octreotide Acetate 100 MCG TID  09  
 
  SC   2200
 
Oxycodone HCl 5 MG BID PRN  0245  
 
  PO   0607
 
Pantoprazole Sodium 40 MG BID  09  
 
  IV   2243
 
Patient Medication  1 ED ONE ONE  1100 MN 
 
Teaching  ED  1101  
 
Thiamine HCl 100 MG 1800  1800  
 
Sodium Chloride 50 ML IV   1632
 
 
 
 
Last 24 Hrs of Lab/Raghav Results
Last 24 Hrs of Labs/Mics:
 Laboratory Tests
 
18 0638:
Anion Gap 14, Estimated GFR 47  L, BUN/Creatinine Ratio 29.2  H, PT 19.2  H, INR
1.75  H, CBC w Diff NO MAN DIFF REQ, RBC 2.36  L, .8  H, MCH 35.7  H, 
MCHC 34.0, RDW 20.8  H, MPV 7.8, Gran % 64.9, Lymphocytes % 19.0  L, Monocytes %
14.1  H, Eosinophils % 1.3, Basophils % 0.7, Absolute Granulocytes 5.0, Absolute
Lymphocytes 1.5, Absolute Monocytes 1.1  H, Absolute Eosinophils 0.1, Absolute 
Basophils 0.1
 
 
Assessment/Plan
Assessment:
Patient is a 32-year-old female with PMH significant for alcohol use disorder 
who presented due to hyponatremia.
 
Overnight patient and got 2 mg IV Ativan overnight.  She is lethargic this 
morning.
 
#Acute blood loss anemia secondary to GI bleed
EGD today showed portal hypertensive gastropathy, grade 1 esophageal varices and
duodenitis.  
- No active bleeding seen on EGD
-We'll continue to monitor CBC with a goal hemoglobin above 8
-H/H remained stable today
 
#Hyponatremia
Sodium continues to improve, today is 134
-continue to monitor BEP daily
-Continue 500 ML fluid restriction
 
#Decompensated alcoholic liver cirrhosis
SBP was ruled out with diagnostic paracentesis, INR has remained stable today.  
Alpha-fetoprotein was found to be within normal limits, INR has leveled off and 
started trending down.  
-GI recommendations appreciated
 
#BRETT secondary to hepatorenal syndrome
Renal ultrasound was rule out obstruction.  Creatinine remains stable but 
elevated at 1.3, will discuss with nephrology the need for continuing albumin.
-Nephrology recommendations appreciated
-decreased albumin to 12.5 TID and will continue to decrease tomorrow
-Continue octreotide and midodrine
 
#Alcohol use disorder
Patient has been scoring zeros on CIWA, will discontinue Ativan
 
Diet: Regular diet, nothing by mouth at midnight for EGD
DVT prophylaxis: Subcutaneous heparin, Alps
CODE STATUS: Full code
Problem List:
 1. Esophageal varices determined by endoscopy
 
 2. Hepatorenal syndrome
 
 3. Anemia
 
Pain Ratin
Pain Location:
none
Pain Goal: Remain pain free
Pain Plan:
pain pathway
Tomorrow's Labs & Rationales:
cbc, bep 
 
 
Jacinta Ball MD 18 1044:
Attending MD Review Statement
 
Attending Statement
Attending MD Statement: examined this patient, discuss w/resident/PA/NP, agreed 
w/resident/PA/NP, reviewed EMR data (avail)
Attending Assessment/Plan:
32F PMH alcohol use disorder admitted with sodium level 117, found to have 
alcoholic cirrhosis with ascites and elevated creatinine concerning for 
hepatorenal syndrome.
 
No complaints, doing well, sodium improving, creatinine stable.  EGD showed very
small non-bleeding varices.
 
1. Decompensated alcoholic cirrhosis with ascites
2. Hyponatremia
3. BRETT
4. Alcohol use disorder
5. Esophageal varices
6. Hepatorenal syndrome
 
Plan
- Does not require telemetry at this time
- Follow nephrology and GI recommendations
- Daily monitoring of sodium
- Will continue albumin, octreotide, and midodrine for now, will discuss 
stopping these with GI
- Re-evaluate need for Percocet
- DVT PPx
- Likely discharge today pending renal recommendations with intensive outpatient
treatment for alcohol abuse

## 2018-05-03 NOTE — PATIENT DISCHARGE INSTRUCTIONS
Discharge Instructions
 
General Discharge Information
You were seen/treated for:
Hyponatremia
Cirrhosis
Hepatorenal syndrome
Acute blood loss anemia
You had these procedures:
Paracentesis
Special Instructions:
Follow-up with Dr. Walters within 1 week of discharge, we have provided you 
with a referral.
 
Follow-up with Dr. Roger within 1 week of discharge, we provided you with a 
referral.
 
Follow-up with her primary care physician within one week.
 
Take all medication as directed. Discuss with your Providers in regards to 
restarting your Spironolactone and Furosemide
 
Call your doctor or return to the ER if you should experience abdominal pain, 
lightheadedness, dizziness, fever, shaking chills, nausea, vomiting, bloody 
stool
 
Diet
Recommended Diet: Heart Healthy
Additional DIET Information:
Sodium restriction
 
Activity
Other activity limits:
As tolerated
 
Acute Coronary Syndrome
 
Inclusion Criteria
At DC or during hospital stay patient has or had the following:
ACS DIAGNOSIS No
 
Discharge Core Measures
Meds if any: Prescribed or Continued at Discharge
Meds if any: NOT Prescribed or Continued at Discharge
 
Congestive Heart Failure
 
Inclusion Criteria
At DC or during hospital stay patient has or had the following:
CHF DIAGNOSIS No
 
Discharge Core Measures
Meds if any: Prescribed or Continued at Discharge
Meds if any: NOT Prescribed or Continued at Discharge
 
Cerebrovascular accident
 
Inclusion Criteria
At DC or during hospital stay patient has or had the following:
CVA/TIA Diagnosis No
 
Discharge Core Measures
Meds if any: Prescribed or Continued at Discharge
Meds if any: NOT Prescribed or Continued at Discharge
 
Venous thromboembolism
 
Inclusion Criteria
VTE Diagnosis No
VTE Type NONE
VTE Confirmed by (Test) NONE
 
Discharge Core Measures
- Per Current guidelines, there needs to be overlap
- treatment for the first 5 days of Warfarin therapy.
- If discharged on Warfarin prior to 5 days of
- overlap therapy, the patient will need to be
- assessed for post discharge needs including
- *Post discharge parental anticoagulation
- *Warfarin and/or parental anticoagulation education
- *Follow up date to check INR post discharge
At least 5 days overlap therapy as Inpatient No
Meds if any: Prescribed or Continued at Discharge
Note: Overlap Therapy is Warfarin and Anticoagulant
Meds if any: NOT Prescribed or Continued at Discharge

## 2018-05-04 VITALS — SYSTOLIC BLOOD PRESSURE: 96 MMHG | DIASTOLIC BLOOD PRESSURE: 50 MMHG

## 2018-05-04 VITALS — SYSTOLIC BLOOD PRESSURE: 100 MMHG | DIASTOLIC BLOOD PRESSURE: 60 MMHG

## 2018-05-04 VITALS — DIASTOLIC BLOOD PRESSURE: 60 MMHG | SYSTOLIC BLOOD PRESSURE: 100 MMHG

## 2018-05-04 VITALS — SYSTOLIC BLOOD PRESSURE: 120 MMHG | DIASTOLIC BLOOD PRESSURE: 66 MMHG

## 2018-05-04 VITALS — DIASTOLIC BLOOD PRESSURE: 62 MMHG | SYSTOLIC BLOOD PRESSURE: 98 MMHG

## 2018-05-04 LAB
ABSOLUTE GRANULOCYTE CT: 6.3 /CUMM (ref 1.4–6.5)
BASOPHILS # BLD: 0 /CUMM (ref 0–0.2)
BASOPHILS NFR BLD: 0.5 % (ref 0–2)
EOSINOPHIL # BLD: 0.1 /CUMM (ref 0–0.7)
EOSINOPHIL NFR BLD: 1.2 % (ref 0–5)
ERYTHROCYTE [DISTWIDTH] IN BLOOD BY AUTOMATED COUNT: 21 % (ref 11.5–14.5)
GRANULOCYTES NFR BLD: 65.3 % (ref 42.2–75.2)
HCT VFR BLD CALC: 24.2 % (ref 37–47)
LYMPHOCYTES # BLD: 1.8 /CUMM (ref 1.2–3.4)
MCH RBC QN AUTO: 35.5 PG (ref 27–31)
MCHC RBC AUTO-ENTMCNC: 33.9 G/DL (ref 33–37)
MCV RBC AUTO: 104.6 FL (ref 81–99)
MONOCYTES # BLD: 1.4 /CUMM (ref 0.1–0.6)
PLATELET # BLD: 135 /CUMM (ref 130–400)
PMV BLD AUTO: 8 FL (ref 7.4–10.4)
PROTHROMBIN TIME: 18.8 SEC (ref 9.4–12.5)
RED BLOOD CELL CT: 2.31 /CUMM (ref 4.2–5.4)
WBC # BLD AUTO: 9.6 /CUMM (ref 4.8–10.8)

## 2018-05-04 NOTE — PN- HOUSESTAFF
Kassandra CORNEJO,Dud[place] 18 0826:
Subjective
Follow-up For:
Hepatorenal syndrome
Alcoholic cirrhosis
Esophageal varices
Hyponatremia
Subjective:
She was seen and examined at bedside.  She was resting comfortably.  Overnight 
the nurse covering discovered a cigarette but in the toilet and when searching 
her purse found multiple faxes cigarettes and a water bottle full of wine.  
Patient was placed on a one-to-one sitter.  Today during morning running patient
for that she would like to leave AGAINST MEDICAL ADVICE.  After discussion about
the severity of her illness and the need to continue monitoring her kidney and 
hepatic function she agreed to stay.  She currently has no complaints.
 
Review of Systems
Constitutional:
Denies: chills, fever. 
Cardiovascular:
Reports: no symptoms. 
Respiratory:
Reports: no symptoms. 
Gastrointestinal:
Reports: no symptoms. 
Genitourinary:
Reports: no symptoms. 
Musculoskeletal:
Reports: no symptoms. 
Skin:
Reports: no symptoms. 
 
Objective
Last 24 Hrs of Vital Signs/I&O
 Vital Signs
 
 
Date Time Temp Pulse Resp B/P B/P Pulse O2 O2 Flow FiO2
 
     Mean Ox Delivery Rate 
 
 0605 99.6 96 20 96/50  94 Room Air  
 
 2251 100.4 102 20 100/74  94   
 
/ 1600 97.7 88 20 114/64     
 
05/03 1443 97.7 88 20 114/64  98   
 
 
 Intake & Output
 
 
  1600  0800  0000
 
Intake Total   420
 
Output Total   
 
Balance   420
 
    
 
Intake, IV   180
 
Intake, Oral   240
 
Patient   126 lb
 
Weight   
 
 
 
 
Physical Exam
General Appearance: Alert, Oriented X3, Cooperative, No Acute Distress
Sepsis Skin Exam (color): Jaundiced
HEENT: scleral icterus
Cardiovascular: Regular Rate, Normal S1, Normal S2
Lungs: Clear to Auscultation, Normal Air Movement
Abdomen: abdomen remains distended
Neurological: Normal Speech, Normal Tone, Sensation Intact
Current Medications:
 Current Medications
 
 
  Sig/Reagan Start time  Last
 
Medication Dose Route Stop Time Status Admin
 
Albumin Human 12.5 GM Q8H  1556 AC 
 
  IV   2305
 
Albumin Human 25 GM Q8H  0756 DC 
 
  IV   0843
 
Folic Acid 1 MG DAILY  0900 AC 
 
  PO   1232
 
Heparin Sodium  5,000 UNIT Q8  2200 AC 
 
(Porcine)  SC   2304
 
Lorazepam 0 Q1P PRN 2000 DC 
 
  IV   2259
 
Midodrine 10 MG 0800,1200,1600  0800 AC 
 
  PO   1804
 
Nicotine 14 MG DAILY AS NEEDED PRN  0730  
 
  TOP   1314
 
Octreotide Acetate 100 MCG TID  0900 AC 
 
  SC   2304
 
Omeprazole 40 MG DAILY AC  0806 AC 
 
  PO   1232
 
Oxycodone HCl 5 MG BID PRN  0245 AC 
 
  PO   0607
 
Phytonadione 5 MG ONCE ONE  1745 DC 
 
  PO  1746  2304
 
Potassium Chloride 40 MEQ ONCE ONE  0915 DC 
 
  PO  0916  1232
 
Thiamine HCl 100 MG 1800  1800  
 
Sodium Chloride 50 ML IV   1639
 
 
 
 
Last 24 Hrs of Lab/Raghav Results
Last 24 Hrs of Labs/Mics:
 Laboratory Tests
 
18 0628:
Anion Gap 13, Estimated GFR 43  L, BUN/Creatinine Ratio 28.6  H, PT 18.8  H, INR
1.72  H, CBC w Diff NO MAN DIFF REQ, RBC 2.31  L, .6  H, MCH 35.5  H, 
MCHC 33.9, RDW 21.0  H, MPV 8.0, Gran % 65.3, Lymphocytes % 18.5  L, Monocytes %
14.5  H, Eosinophils % 1.2, Basophils % 0.5, Absolute Granulocytes 6.3, Absolute
Lymphocytes 1.8, Absolute Monocytes 1.4  H, Absolute Eosinophils 0.1, Absolute 
Basophils 0, Serum Alcohol < 10.0
 
 
Assessment/Plan
Assessment:
Patient is a 32-year-old female with PMH significant for alcohol use disorder 
who presented due to hyponatremia.
 
Overnight patient was found to be smoking a cigarette and have wine in her purse
 
 
#Acute blood loss anemia secondary to GI bleed
EGD showed portal hypertensive gastropathy, grade 1 esophageal varices and 
duodenitis.  
- No active bleeding seen on EGD
-H/H remains stable 
 
#Hyponatremia
Sodium continues to improve, today is 135
-continue to monitor BEP daily
-Fluid restriction increased to 1000 mL's daily
 
#Decompensated alcoholic liver cirrhosis
SBP was ruled out with diagnostic paracentesis, INR has remained stable today.  
Alpha-fetoprotein was found to be within normal limits, INR has leveled off and 
started trending down.  
-GI recommendations appreciated
 
#BRETT secondary to hepatorenal syndrome
Renal ultrasound was rule out obstruction.  Creatinine remains stable but 
elevated at 1.4, will discuss with nephrology the need for continuing albumin.
- Nephrology recommendations appreciated
- decreased albumin to 12.5 BID and will continue to decrease tomorrow
- Continue octreotide 
- midodrine dose decreased due to concern of borderline BP and dependent on 
telemetry
- If patient's renal function remained stable and BP holds within the normal 
range, can likely be DC'd over the weekend
 
#Alcohol use disorder
Patient has been scoring zeros on CIWA, will discontinue Ativan
 
Diet: Regular diet 
DVT prophylaxis: Subcutaneous heparin, Alps
CODE STATUS: Full code
Problem List:
 1. Esophageal varices determined by endoscopy
 
 2. Hepatorenal syndrome
 
 3. Anemia
 
 4. Pancreatitis
 
Pain Ratin
Pain Location:
none
Pain Goal: Remain pain free
Pain Plan:
pain pathway
Tomorrow's Labs & Rationales:
cbc, bep, inr, LFTs
 
 
Jacinta Ball MD 18 1204:
Attending MD Review Statement
 
Attending Statement
Attending MD Statement: examined this patient, discuss w/resident/PA/NP, agreed 
w/resident/PA/NP, reviewed EMR data (avail)
Attending Assessment/Plan:
32F PMH alcohol use disorder admitted with sodium level 117, found to have 
alcoholic cirrhosis with ascites and elevated creatinine concerning for 
hepatorenal syndrome.
 
No complaints, doing well, sodium improving, creatinine stable.  EGD showed very
small non-bleeding varices.  Was found drinking wine and smoking cigarettes in 
her room last night.  Almost left AMA this morning but was convinced to stay.
 
1. Decompensated alcoholic cirrhosis with ascites
2. Hyponatremia
3. BRETT
4. Alcohol use disorder
5. Esophageal varices
6. Hepatorenal syndrome
 
Plan
- Does not require telemetry at this time
- Follow nephrology and GI recommendations
- Daily monitoring of sodium
- Will continue albumin, octreotide, and midodrine for now, will discuss 
stopping these with GI
- DVT PPx

## 2018-05-05 VITALS — DIASTOLIC BLOOD PRESSURE: 68 MMHG | SYSTOLIC BLOOD PRESSURE: 108 MMHG

## 2018-05-05 VITALS — SYSTOLIC BLOOD PRESSURE: 112 MMHG | DIASTOLIC BLOOD PRESSURE: 60 MMHG

## 2018-05-05 VITALS — DIASTOLIC BLOOD PRESSURE: 60 MMHG | SYSTOLIC BLOOD PRESSURE: 112 MMHG

## 2018-05-05 VITALS — SYSTOLIC BLOOD PRESSURE: 108 MMHG | DIASTOLIC BLOOD PRESSURE: 68 MMHG

## 2018-05-05 VITALS — SYSTOLIC BLOOD PRESSURE: 110 MMHG | DIASTOLIC BLOOD PRESSURE: 66 MMHG

## 2018-05-05 VITALS — DIASTOLIC BLOOD PRESSURE: 58 MMHG | SYSTOLIC BLOOD PRESSURE: 114 MMHG

## 2018-05-05 VITALS — DIASTOLIC BLOOD PRESSURE: 66 MMHG | SYSTOLIC BLOOD PRESSURE: 110 MMHG

## 2018-05-05 LAB
ABSOLUTE GRANULOCYTE CT: 5.8 /CUMM (ref 1.4–6.5)
BASOPHILS # BLD: 0 /CUMM (ref 0–0.2)
BASOPHILS NFR BLD: 0.5 % (ref 0–2)
EOSINOPHIL # BLD: 0.1 /CUMM (ref 0–0.7)
EOSINOPHIL NFR BLD: 1.6 % (ref 0–5)
ERYTHROCYTE [DISTWIDTH] IN BLOOD BY AUTOMATED COUNT: 20.9 % (ref 11.5–14.5)
GRANULOCYTES NFR BLD: 66.8 % (ref 42.2–75.2)
HCT VFR BLD CALC: 25 % (ref 37–47)
LYMPHOCYTES # BLD: 1.6 /CUMM (ref 1.2–3.4)
MCH RBC QN AUTO: 35.3 PG (ref 27–31)
MCHC RBC AUTO-ENTMCNC: 33.7 G/DL (ref 33–37)
MCV RBC AUTO: 104.8 FL (ref 81–99)
MONOCYTES # BLD: 1.1 /CUMM (ref 0.1–0.6)
PLATELET # BLD: 132 /CUMM (ref 130–400)
PMV BLD AUTO: 7.8 FL (ref 7.4–10.4)
PROTHROMBIN TIME: 19.4 SEC (ref 9.4–12.5)
RED BLOOD CELL CT: 2.39 /CUMM (ref 4.2–5.4)
WBC # BLD AUTO: 8.7 /CUMM (ref 4.8–10.8)

## 2018-05-05 NOTE — PN- GASTROENTEROLOGY
Assessment/Plan GI
Assessment/Recommendations:
Alcoholic cirrhosis/hepatitis.  Worsening hyperbilirubinemia, stable 
coagulopathy, no overt encephalopathy.  Small varices on endoscopy.  Ascites, 
without SBP, complicated by BRETT thought to be hepatorenal, and hyponatremia (
resolved).  Maddrey discriminant function score 46.  Very poor prognosis, as the
patient was drinking alcohol in her room, has poor insight, and is likely to 
have a bad outcome once discharged.
 
Recommendations
* Would D/C albumin, and continue octreotide and midodrine.  Follow electrolytes
/renal function carefully.
* Await nephrology input; please recall them
* May discontinue fluid restriction
* Please change diet to 2 g sodium
* Consider pentoxifylline 400 mg PO TID for 28 days
 
 
 
 
 
 
 
 
Subjective
Subjective:
Has some abdominal discomfort.  No nausea, vomiting, bleeding, diarrhea.  Claims
to be urinating well.
 
Objective
Vital Signs and I&Os
Vital Signs
 
 
Date Time Temp Pulse Resp B/P B/P Pulse O2 O2 Flow FiO2
 
     Mean Ox Delivery Rate 
 
05/05 1200  98 16 110/66     
 
05/05 1156    110/66     
 
05/05 0800       Room Air  
 
05/05 0800 98.7 106 20 108/68     
 
05/05 0705 98.7 106 20 108/68  96 Room Air  
 
05/04 2329 99.6 94 22 120/66  94 Room Air  
 
05/04 1420 98.8 88 22 98/62  94   
 
05/04 1400  88 16 100/60     
 
 
 Intake & Output
 
 
 05/05 1600 05/05 0400 05/04 1600 05/04 0400 05/03 1600 05/03 0400
 
Intake Total 110 100 542 420 580 300
 
Output Total   325  300 
 
Balance 110 100 217 420 280 300
 
       
 
Intake, Blood     100 
 
Product      
 
Intake, IV 10 100 70 180 130 
 
Intake, Oral 100  472 240 350 300
 
Number     0 2
 
Bowel      
 
Movements      
 
Output, Urine   325  300 
 
Patient  124 lb  126 lb  130 lb
 
Weight      
 
 
 
Physical Exam:
Alert and oriented.  No asterixis.  Sclera icteric.  Large ecchymosis on right 
upper extremity.  Abdomen distended, positive caput medusa, mild tenderness.  
Trace pretibial edema..
Current Medications:
 Current Medications
 
 
  Sig/Reagan Start time  Last
 
Medication Dose Route Stop Time Status Admin
 
Al Hydroxide/Mg  30 ML Q4-6 PRN PRN 05/04 1530 AC 
 
Hydroxide  PO   
 
Albumin Human 12.5 GM BID 05/04 2100 AC 05/05
 
  IV   0816
 
Folic Acid 1 MG DAILY 04/29 0900 AC 05/05
 
  PO   0816
 
Heparin Sodium  5,000 UNIT Q8 04/30 2200 AC 05/05
 
(Porcine)  SC   0644
 
Midodrine 5 MG 0800,1200,1600 05/04 1600 AC 05/05
 
  PO   1157
 
Nicotine 14 MG DAILY AS NEEDED PRN 04/29 0730  05/02
 
  TOP   1314
 
Octreotide Acetate 100 MCG TID 04/29 0900 AC 05/05
 
  SC   0816
 
Omeprazole 40 MG DAILY AC 05/03 0806 AC 05/05
 
  PO   0644
 
Oxycodone HCl 5 MG BID PRN 04/29 0245 AC 05/05
 
  PO   0040
 
Thiamine HCl 100 MG 1800 04/29 1800 AC 05/04
 
Sodium Chloride 50 ML IV   1806
 
 
 
 
Results
Pertinent Lab Results:
 Laboratory Tests
 
 
 05/05 05/04
 
 0654 0628
 
Chemistry  
 
  Sodium (137 - 145 mmol/L) 137 135  L
 
  Potassium (3.5 - 5.1 mmol/L) 3.9 4.1
 
  Chloride (98 - 107 mmol/L) 96  L 95  L
 
  Carbon Dioxide (22 - 30 mmol/L) 27 27
 
  Anion Gap (5 - 16) 14 13
 
  BUN (7 - 17 mg/dL) 36  H 40  H
 
  Creatinine (0.5 - 1.0 mg/dL) 1.2  H 1.4  H
 
  Estimated GFR (>60 ml/min) 51  L 43  L
 
  BUN/Creatinine Ratio (7 - 25 %) 30.0  H 28.6  H
 
  Total Bilirubin (0.2 - 1.3 mg/dL) 12.1  H 
 
  Direct Bilirubin (< 0.4 mg/dL) 7.7  H 
 
  AST (14 - 36 U/L) 54  H 
 
  ALT (9 - 52 U/L) 27 
 
  Alkaline Phosphatase (<127 U/L) 121 
 
  Total Protein (6.3 - 8.2 g/dL) 6.3 
 
  Albumin (3.5 - 5.0 g/dL) 3.9 
 
Coagulation  
 
  PT (9.4 - 12.5 SEC) 19.4  H 18.8  H
 
  INR (0.90 - 1.19) 1.77  H 1.72  H
 
Hematology  
 
  CBC w Diff NO MAN DIFF REQ NO MAN DIFF REQ
 
  WBC (4.8 - 10.8 /CUMM) 8.7 9.6
 
  RBC (4.20 - 5.40 /CUMM) 2.39  L 2.31  L
 
  Hgb (12.0 - 16.0 G/DL) 8.4  L 8.2  L
 
  Hct (37 - 47 %) 25.0  L 24.2  L
 
  MCV (81.0 - 99.0 FL) 104.8  H 104.6  H
 
  MCH (27.0 - 31.0 PG) 35.3  H 35.5  H
 
  MCHC (33.0 - 37.0 G/DL) 33.7 33.9
 
  RDW (11.5 - 14.5 %) 20.9  H 21.0  H
 
  Plt Count (130 - 400 /CUMM) 132 135
 
  MPV (7.4 - 10.4 FL) 7.8 8.0
 
  Gran % (42.2 - 75.2 %) 66.8 65.3
 
  Lymphocytes % (20.5 - 51.1 %) 17.9  L 18.5  L
 
  Monocytes % (1.7 - 9.3 %) 13.2  H 14.5  H
 
  Eosinophils % (0 - 5 %) 1.6 1.2
 
  Basophils % (0.0 - 2.0 %) 0.5 0.5
 
  Absolute Granulocytes (1.4 - 6.5 /CUMM) 5.8 6.3
 
  Absolute Lymphocytes (1.2 - 3.4 /CUMM) 1.6 1.8
 
  Absolute Monocytes (0.10 - 0.60 /CUMM) 1.1  H 1.4  H
 
  Absolute Eosinophils (0.0 - 0.7 /CUMM) 0.1 0.1
 
  Absolute Basophils (0.0 - 0.2 /CUMM) 0 0
 
Toxicology  
 
  Serum Alcohol (<10 MG/DL)  < 10.0
 
 
 
 
 05/03
 
 0638
 
Chemistry 
 
  Sodium (137 - 145 mmol/L) 134  L
 
  Potassium (3.5 - 5.1 mmol/L) 3.4  L
 
  Chloride (98 - 107 mmol/L) 92  L
 
  Carbon Dioxide (22 - 30 mmol/L) 28
 
  Anion Gap (5 - 16) 14
 
  BUN (7 - 17 mg/dL) 38  H
 
  Creatinine (0.5 - 1.0 mg/dL) 1.3  H
 
  Estimated GFR (>60 ml/min) 47  L
 
  BUN/Creatinine Ratio (7 - 25 %) 29.2  H
 
Coagulation 
 
  PT (9.4 - 12.5 SEC) 19.2  H
 
  INR (0.90 - 1.19) 1.75  H
 
Hematology 
 
  CBC w Diff NO MAN DIFF REQ
 
  WBC (4.8 - 10.8 /CUMM) 7.7
 
  RBC (4.20 - 5.40 /CUMM) 2.36  L
 
  Hgb (12.0 - 16.0 G/DL) 8.4  L
 
  Hct (37 - 47 %) 24.7  L
 
  MCV (81.0 - 99.0 FL) 104.8  H
 
  MCH (27.0 - 31.0 PG) 35.7  H
 
  MCHC (33.0 - 37.0 G/DL) 34.0
 
  RDW (11.5 - 14.5 %) 20.8  H
 
  Plt Count (130 - 400 /CUMM) 135
 
  MPV (7.4 - 10.4 FL) 7.8
 
  Gran % (42.2 - 75.2 %) 64.9
 
  Lymphocytes % (20.5 - 51.1 %) 19.0  L
 
  Monocytes % (1.7 - 9.3 %) 14.1  H
 
  Eosinophils % (0 - 5 %) 1.3
 
  Basophils % (0.0 - 2.0 %) 0.7
 
  Absolute Granulocytes (1.4 - 6.5 /CUMM) 5.0
 
  Absolute Lymphocytes (1.2 - 3.4 /CUMM) 1.5
 
  Absolute Monocytes (0.10 - 0.60 /CUMM) 1.1  H
 
  Absolute Eosinophils (0.0 - 0.7 /CUMM) 0.1
 
  Absolute Basophils (0.0 - 0.2 /CUMM) 0.1
 
 
 
 
 
  Eosinophils % (0 - 5 %) 1.3
 
  Basophils % (0.0 - 2.0 %) 0.7
 
  Absolute Granulocytes (1.4 - 6.5 /CUMM) 5.0
 
  Absolute Lymphocytes (1.2 - 3.4 /CUMM) 1.5
 
  Absolute Monocytes (0.10 - 0.60 /CUMM) 1.1  H
 
  Absolute Eosinophils (0.0 - 0.7 /CUMM) 0.1
 
  Absolute Basophils (0.0 - 0.2 /CUMM) 0.1

## 2018-05-05 NOTE — PN- HOUSESTAFF
**See Addendum**
Subjective
Follow-up For:
Hepatorenal syndrome
Alcoholic cirrhosis
Esophageal varices
Hyponatremia
Tele-Events Since Last Visit:
off telemetry
Subjective:
no complaints
anxious to be discharged
 
Review of Systems
Constitutional:
Reports: see HPI. 
 
Objective
Last 24 Hrs of Vital Signs/I&O
 Vital Signs
 
 
Date Time Temp Pulse Resp B/P B/P Pulse O2 O2 Flow FiO2
 
     Mean Ox Delivery Rate 
 
 0800       Room Air  
 
 0800 98.7 106 20 108/68     
 
05/ 0705 98.7 106 20 108/68  96 Room Air  
 
/ 2329 99.6 94 22 120/66  94 Room Air  
 
/ 1420 98.8 88 22 98/62  94   
 
05/04 1400  88 16 100/60     
 
05/04 1201  88 16 100/60     
 
/ 1200  88 16 100/60     
 
 
 Intake & Output
 
 
  1600  0800  0000
 
Intake Total  110 100
 
Output Total   
 
Balance  110 100
 
    
 
Intake, IV  10 100
 
Intake, Oral  100 
 
Patient   56.331 kg
 
Weight   
 
 
 
 
Physical Exam
General Appearance: Alert, Oriented X3, Cooperative, No Acute Distress
Skin: jaundiced
HEENT: scleral icteris
Cardiovascular: Regular Rate, Normal S1, Normal S2, No Murmurs
Lungs: Clear to Auscultation, Normal Air Movement
Abdomen: distended but soft abdomen with ascites
Extremities: No Clubbing, No Cyanosis, No Edema, Normal Pulses, RUE bruising
Current Medications:
 Current Medications
 
 
  Sig/Reagan Start time  Last
 
Medication Dose Route Stop Time Status Admin
 
Al Hydroxide/Mg  30 ML Q4-6 PRN PRN  1530 AC 
 
Hydroxide  PO   
 
Albumin Human 12.5 GM BID  2100 AC 
 
  IV   0816
 
Albumin Human 12.5 GM Q8H  1556 DC 
 
  IV   0814
 
Folic Acid 1 MG DAILY  0900 AC 
 
  PO   0816
 
Heparin Sodium  5,000 UNIT Q8  2200 AC 
 
(Porcine)  SC   0644
 
Midodrine 5 MG 0800,1200,1600  1600 AC 
 
  PO   0816
 
Midodrine 10 MG 0800,1200,1600  0800 DC 
 
  PO   1157
 
Nicotine 14 MG DAILY AS NEEDED PRN  0730 AC 
 
  TOP   1314
 
Octreotide Acetate 100 MCG TID  0900 AC 
 
  SC   0816
 
Omeprazole 40 MG DAILY   0806  
 
  PO   0644
 
Oxycodone HCl 5 MG BID PRN  0245  
 
  PO   0040
 
Thiamine HCl 100 MG 1800  1800  
 
Sodium Chloride 50 ML IV   1806
 
 
 
 
Last 24 Hrs of Lab/Rgahav Results
Last 24 Hrs of Labs/Mics:
 Laboratory Tests
 
18 0654:
Anion Gap 14, Estimated GFR 51  L, BUN/Creatinine Ratio 30.0  H, Total Bilirubin
12.1  H, Direct Bilirubin 7.7  H, AST 54  H, ALT 27, Alkaline Phosphatase 121, 
Total Protein 6.3, Albumin 3.9, PT 19.4  H, INR 1.77  H, CBC w Diff NO MAN DIFF 
REQ, RBC 2.39  L, .8  H, MCH 35.3  H, MCHC 33.7, RDW 20.9  H, MPV 7.8, 
Gran % 66.8, Lymphocytes % 17.9  L, Monocytes % 13.2  H, Eosinophils % 1.6, 
Basophils % 0.5, Absolute Granulocytes 5.8, Absolute Lymphocytes 1.6, Absolute 
Monocytes 1.1  H, Absolute Eosinophils 0.1, Absolute Basophils 0
 
 
Assessment/Plan
Assessment:
32-year-old female with PMH significant for alcohol use disorder and 
decompensated cirrhosis
 
Acute blood loss anemia secondary to GI bleed
 EGD showed portal hypertensive gastropathy
 grade 1 esophageal varices and duodenitis.  
 No active bleeding seen on EGD
 H/H remains stable 
 
Hyponatremia
 Sodium continues to improve on fluid restriction
 Continue to monitor daily chemistries and fluid restriction
 
Decompensated alcoholic liver cirrhosis
 SBP was ruled out with diagnostic paracentesis
 INR has remained stable today.  
 Alpha-fetoprotein negative
 Bilirubinemia worsening
 Follow up GI recommendations
 
BRETT secondary to hepatorenal syndrome
 Renal ultrasound was negative for obstruction
 Creatinine improving, 1.2 today, on octreotide, midodrine and albumin
 Nephrology recommendations to be followed
 Midodrine dose decreased for hypotension
 
 
Alcohol use disorder
 Monitor per [facility] protocol
 
Regular diet 
DVT ppx-heparin subcutaneous
Full code
Problem List:
 1. Esophageal varices determined by endoscopy
 
 2. Hepatorenal syndrome
 
 3. Ascites due to alcoholic cirrhosis
 
 4. Anemia
 
 5. Alcohol abuse
 
 6. Hyponatremia
 
Pain Ratin
Pain Location:
n/a
Pain Goal: Pain 4 or less
Pain Plan:
prn
Tomorrow's Labs & Rationales:
bep/lfts/pt/inr

## 2018-05-06 VITALS — SYSTOLIC BLOOD PRESSURE: 130 MMHG | DIASTOLIC BLOOD PRESSURE: 70 MMHG

## 2018-05-06 VITALS — DIASTOLIC BLOOD PRESSURE: 70 MMHG | SYSTOLIC BLOOD PRESSURE: 130 MMHG

## 2018-05-06 VITALS — DIASTOLIC BLOOD PRESSURE: 64 MMHG | SYSTOLIC BLOOD PRESSURE: 112 MMHG

## 2018-05-06 VITALS — SYSTOLIC BLOOD PRESSURE: 116 MMHG | DIASTOLIC BLOOD PRESSURE: 68 MMHG

## 2018-05-06 VITALS — SYSTOLIC BLOOD PRESSURE: 118 MMHG | DIASTOLIC BLOOD PRESSURE: 62 MMHG

## 2018-05-06 LAB — PROTHROMBIN TIME: 21.4 SEC (ref 9.4–12.5)

## 2018-05-06 NOTE — PN- HOUSESTAFF
Kassandra CORNJEO,Dud[place] 18 1138:
Subjective
Follow-up For:
Alcoholic hepatitis, decompensated cirrhosis
Alcohol use disorder
Hepatorenal syndrome
Esophageal varices
Hyponatremia
Subjective:
Patient was seen and examined at bedside.  She is resting comfortably.  
Overnight she began her menstrual period and became febrile.  She believes that 
she has fevers when she is menstruating.  She has no new complaints today, 
continues to have abdominal distention.  She has not experienced recurrence of 
fever or chills since last night.  She reports a mild nonproductive cough. She 
denies any nausea, vomiting, fever, chills, chest pain, shortness of breath, 
palpitations.
 
Review of Systems
Constitutional:
Denies: chills, fever. 
Cardiovascular:
Denies: chest pain, palpitations. 
Respiratory:
Reports: cough. 
Gastrointestinal:
Reports: bloating. 
Genitourinary:
Reports: no symptoms. 
 
Objective
Last 24 Hrs of Vital Signs/I&O
 Vital Signs
 
 
Date Time Temp Pulse Resp B/P B/P Pulse O2 O2 Flow FiO2
 
     Mean Ox Delivery Rate 
 
/ 1000  106 16 130/70     
 
05/06 0800       Room Air  
 
05/06 0800 98.4 124 20 130/70     
 
05/06 0641 98.4 124 24 130/70  95 Room Air  
 
05/06 0053 99.0        
 
05/05 2237 101.2 110 16 114/58  96 Room Air  
 
05/05 1800  106 20 112/60     
 
05/05 1600 99.5 106 20 112/60     
 
05/05 1433 99.5 106 20 112/60  94 Room Air  
 
05/05 1400  98 16 110/66     
 
05/05 1200  98 16 110/66     
 
05/05 1156    110/66     
 
 
 Intake & Output
 
 
 /06 1600 05/06 0800 05/06 0000
 
Intake Total 140 300 431
 
Output Total   
 
Balance 140 300 431
 
    
 
Intake, IV 20  71
 
Intake, Oral 120 300 360
 
Number 1 1 
 
Bowel   
 
Movements   
 
Patient   121 lb
 
Weight   
 
Weight   Bed scale
 
Measurement   
 
Method   
 
 
 
 
Physical Exam
General Appearance: Alert, Oriented X3, Cooperative, No Acute Distress
Sepsis Skin Exam (color): Jaundiced
HEENT: scleral icterus
Cardiovascular: Regular Rate, Normal S1, Normal S2
Lungs: Clear to Auscultation, Normal Air Movement
Abdomen: abdominal distention, no tenderness to palpation
Neurological: Normal Speech, Normal Tone, Sensation Intact
Extremities: No Clubbing, No Cyanosis, No Edema
Current Medications:
 Current Medications
 
 
  Sig/Reagan Start time  Last
 
Medication Dose Route Stop Time Status Admin
 
Al Hydroxide/Mg  30 ML Q4-6 PRN PRN  1530 AC 
 
Hydroxide  PO   
 
Albumin Human 12.5 GM BID  2100 DC 
 
  IV   0816
 
Ceftriaxone Sodium 1,000 MG DAILY  1000 AC 
 
  IV   1022
 
Folic Acid 1 MG DAILY  0900 AC 
 
  PO   0733
 
Heparin Sodium  5,000 UNIT Q8  2200 AC 
 
(Porcine)  SC   0527
 
Midodrine 5 MG 0800,1200,1600  1600 AC 
 
  PO   0733
 
Nicotine 14 MG DAILY AS NEEDED PRN  0730 AC 
 
  TOP   1022
 
Octreotide Acetate 100 MCG TID  0900 AC 
 
  SC   0733
 
Omeprazole 40 MG DAILY AC  0806 AC 
 
  PO   0527
 
Oxycodone HCl 5 MG BID PRN  0245 AC 
 
  PO   0009
 
Pentoxifylline 400 MG TID  2100 AC 
 
  PO   0733
 
Thiamine HCl 100 MG 1800  1800 AC 
 
Sodium Chloride 50 ML IV   1747
 
 
 
 
Assessment/Plan
Assessment:
Patient is a 32-year-old female with PMH significant for alcohol use disorder 
who presented due to hyponatremia.
 
Overnight patient spiked a fever of 101.2.  Previous paracentesis ruled out SBP,
will pursue repeat diagnostic paracentesis today bedside.  
 
#Sinus tachycardia
Patient has had mildly elevated heart rate over the last 24 hours, EKG shows 
sinus tachycardia, possibly secondary to fever
 
#Acute blood loss anemia secondary to GI bleed
EGD showed portal hypertensive gastropathy, grade 1 esophageal varices and 
duodenitis.  
- No active bleeding seen on EGD
- H/H remains stable 
 
#Hyponatremia
Sodium continues to improve, today is 137
-continue to monitor BEP daily
-Patient no longer requires fluid restriction
 
#Decompensated alcoholic liver cirrhosis
Given fever overnight, will pursue diagnostic paracentesis today to rule out 
SBP.  Alpha-fetoprotein was found to be within normal limits.
-GI recommendations appreciated
-Empirically started on IV ceftriaxone
-INR also continues to trend up over the last 2 days, will will continue to 
follow INR
 
#BRETT secondary to hepatorenal syndrome, improving
Renal ultrasound was rule out obstruction.  Creatinine has decreased currently 
1.1
- Albumin has been discontinued
-Will reconsult nephrology for recommendations on continuing Midrine and 
octreotide as an outpatient, patient is unlikely to be compliant with these 
medications as an outpatient.
- Continue octreotide 
- Continue management
 
Diet: Heart healthy diet
DVT prophylaxis: Subcutaneous heparin, Alps
CODE STATUS: Full code
Problem List:
 1. Esophageal varices determined by endoscopy
 
 2. Hepatorenal syndrome
 
 3. Anemia
 
 4. Ascites due to alcoholic cirrhosis
 
Pain Ratin
Pain Location:
none
Pain Goal: Remain pain free
Pain Plan:
pain pathway
Tomorrow's Labs & Rationales:
cbc, bep INR
 
 
Marco Holloway 18 1339:
Attending MD Review Statement
 
Attending Statement
Attending MD Statement: examined this patient, discuss w/resident/PA/NP, agreed 
w/resident/PA/NP, discussed with family, reviewed EMR data (avail), discussed 
with nursing, discussed with case mgmt, reviewed images, amended to note
Attending Assessment/Plan:
Patient spiked fever overnight with tachyacrdia. GI and nephorlogy consulted 
during the hospital stay. Albumin stopped, midodrine and octreotide continued. 
Patient needs repeat diagnostic/therpaurtic paracentesis or consider empiric abx
for SBP. Continue monitor creatinine. She is not on diuretics. Her prognosis is 
overall poor.

## 2018-05-06 NOTE — PN- NEPHROLOGY
Assessment/Plan Nephrology
Assessment:
HRS, creatinine better but not back to baseline.  Creatinine of 1 still over 
March value and patient with little muscle mass.  Albumin has been discontinued.
 
Suggestion:
Would also trial stopping octrotide as difficult to use as outpatient, continue 
midodrine and follow. 
 
 
Subjective
Subjective:
No acute complaints although still seems confused about her disease. 
 
Objective
Vital Signs and I&Os
Vital Signs
 
 
Date Time Temp Pulse Resp B/P B/P Pulse O2 O2 Flow FiO2
 
     Mean Ox Delivery Rate 
 
05/06 1000  106 16 130/70     
 
05/06 0800       Room Air  
 
05/06 0800 98.4 124 20 130/70     
 
05/06 0641 98.4 124 24 130/70  95 Room Air  
 
05/06 0053 99.0        
 
05/05 2237 101.2 110 16 114/58  96 Room Air  
 
05/05 1800  106 20 112/60     
 
05/05 1600 99.5 106 20 112/60     
 
05/05 1433 99.5 106 20 112/60  94 Room Air  
 
05/05 1400  98 16 110/66     
 
05/05 1200  98 16 110/66     
 
05/05 1156    110/66     
 
 
 Intake & Output
 
 
 05/06 1600 05/06 0400 05/05 1600 05/05 0400 05/04 1600 05/04 0400
 
Intake Total 440 431 402 100 542 420
 
Output Total     325 
 
Balance 440 431 402 100 217 420
 
       
 
Intake, IV 20 71 80 100 70 180
 
Intake, Oral 420 360 322  472 240
 
Number 2  1   
 
Bowel      
 
Movements      
 
Output, Urine     325 
 
Patient  121 lb  124 lb  126 lb
 
Weight      
 
Weight  Bed scale    
 
Measurement      
 
Method      
 
 
 
Physical Exam:
NAD  VS as above  Lungs: clear  CV: no rub  Abd: distended, tympanitic  Exts: no
edema  Neuro: awake, poor concentration
Current Medications:
 Current Medications
 
 
  Sig/Reagan Start time  Last
 
Medication Dose Route Stop Time Status Admin
 
Al Hydroxide/Mg  30 ML Q4-6 PRN PRN 05/04 1530 AC 
 
Hydroxide  PO   
 
Albumin Human 12.5 GM BID 05/04 2100 DC 05/05
 
  IV   0816
 
Ceftriaxone Sodium 1,000 MG DAILY 05/06 1000 AC 05/06
 
  IV   1022
 
Folic Acid 1 MG DAILY 04/29 0900 AC 05/06
 
  PO   0733
 
Heparin Sodium  5,000 UNIT Q8 04/30 2200 AC 05/06
 
(Porcine)  SC   0527
 
Midodrine 5 MG 0800,1200,1600 05/04 1600 AC 05/06
 
  PO   0733
 
Nicotine 14 MG DAILY AS NEEDED PRN 04/29 0730 AC 05/06
 
  TOP   1022
 
Octreotide Acetate 100 MCG TID 04/29 0900 AC 05/06
 
  SC   0733
 
Omeprazole 40 MG DAILY AC 05/03 0806 AC 05/06
 
  PO   0527
 
Oxycodone HCl 5 MG BID PRN 04/29 0245 AC 05/06
 
  PO   0009
 
Pentoxifylline 400 MG TID 05/05 2100 AC 05/06
 
  PO   0733
 
Thiamine HCl 100 MG 1800 04/29 1800 AC 05/05
 
Sodium Chloride 50 ML IV   1747
 
 
 
 
Results
Pertinent Lab Results:
 Laboratory Tests
 
 
 05/06 05/05
 
 0900 0654
 
Chemistry  
 
  Sodium (137 - 145 mmol/L) 137 137
 
  Potassium (3.5 - 5.1 mmol/L) 4.0 3.9
 
  Chloride (98 - 107 mmol/L) 95  L 96  L
 
  Carbon Dioxide (22 - 30 mmol/L) 25 27
 
  Anion Gap (5 - 16) 16 14
 
  BUN (7 - 17 mg/dL) 37  H 36  H
 
  Creatinine (0.5 - 1.0 mg/dL) 1.1  H 1.2  H
 
  Estimated GFR (>60 ml/min) 57  L 51  L
 
  BUN/Creatinine Ratio (7 - 25 %) 33.6  H 30.0  H
 
  Total Bilirubin (0.2 - 1.3 mg/dL) 12.4  H 12.1  H
 
  Direct Bilirubin (< 0.4 mg/dL) 8.3  H 7.7  H
 
  AST (14 - 36 U/L) 68  H 54  H
 
  ALT (9 - 52 U/L) 35 27
 
  Alkaline Phosphatase (<127 U/L) 124 121
 
  Total Protein (6.3 - 8.2 g/dL) 7.0 6.3
 
  Albumin (3.5 - 5.0 g/dL) 4.0 3.9
 
Coagulation  
 
  PT (9.4 - 12.5 SEC) 21.4  H 19.4  H
 
  INR (0.90 - 1.19) 1.95  H 1.77  H
 
Hematology  
 
  CBC w Diff  NO MAN DIFF REQ
 
  WBC (4.8 - 10.8 /CUMM)  8.7
 
  RBC (4.20 - 5.40 /CUMM)  2.39  L
 
  Hgb (12.0 - 16.0 G/DL)  8.4  L
 
  Hct (37 - 47 %)  25.0  L
 
  MCV (81.0 - 99.0 FL)  104.8  H
 
  MCH (27.0 - 31.0 PG)  35.3  H
 
  MCHC (33.0 - 37.0 G/DL)  33.7
 
  RDW (11.5 - 14.5 %)  20.9  H
 
  Plt Count (130 - 400 /CUMM)  132
 
  MPV (7.4 - 10.4 FL)  7.8
 
  Gran % (42.2 - 75.2 %)  66.8
 
  Lymphocytes % (20.5 - 51.1 %)  17.9  L
 
  Monocytes % (1.7 - 9.3 %)  13.2  H
 
  Eosinophils % (0 - 5 %)  1.6
 
  Basophils % (0.0 - 2.0 %)  0.5
 
  Absolute Granulocytes (1.4 - 6.5 /CUMM)  5.8
 
  Absolute Lymphocytes (1.2 - 3.4 /CUMM)  1.6
 
  Absolute Monocytes (0.10 - 0.60 /CUMM)  1.1  H
 
  Absolute Eosinophils (0.0 - 0.7 /CUMM)  0.1
 
  Absolute Basophils (0.0 - 0.2 /CUMM)  0
 
 
 
 
 05/04
 
 0628
 
Chemistry 
 
  Sodium (137 - 145 mmol/L) 135  L
 
  Potassium (3.5 - 5.1 mmol/L) 4.1
 
  Chloride (98 - 107 mmol/L) 95  L
 
  Carbon Dioxide (22 - 30 mmol/L) 27
 
  Anion Gap (5 - 16) 13
 
  BUN (7 - 17 mg/dL) 40  H
 
  Creatinine (0.5 - 1.0 mg/dL) 1.4  H
 
  Estimated GFR (>60 ml/min) 43  L
 
  BUN/Creatinine Ratio (7 - 25 %) 28.6  H
 
Coagulation 
 
  PT (9.4 - 12.5 SEC) 18.8  H
 
  INR (0.90 - 1.19) 1.72  H
 
Hematology 
 
  CBC w Diff NO MAN DIFF REQ
 
  WBC (4.8 - 10.8 /CUMM) 9.6
 
  RBC (4.20 - 5.40 /CUMM) 2.31  L
 
  Hgb (12.0 - 16.0 G/DL) 8.2  L
 
  Hct (37 - 47 %) 24.2  L
 
  MCV (81.0 - 99.0 FL) 104.6  H
 
  MCH (27.0 - 31.0 PG) 35.5  H
 
  MCHC (33.0 - 37.0 G/DL) 33.9
 
  RDW (11.5 - 14.5 %) 21.0  H
 
  Plt Count (130 - 400 /CUMM) 135
 
  MPV (7.4 - 10.4 FL) 8.0
 
  Gran % (42.2 - 75.2 %) 65.3
 
  Lymphocytes % (20.5 - 51.1 %) 18.5  L
 
  Monocytes % (1.7 - 9.3 %) 14.5  H
 
  Eosinophils % (0 - 5 %) 1.2
 
  Basophils % (0.0 - 2.0 %) 0.5
 
  Absolute Granulocytes (1.4 - 6.5 /CUMM) 6.3
 
  Absolute Lymphocytes (1.2 - 3.4 /CUMM) 1.8
 
  Absolute Monocytes (0.10 - 0.60 /CUMM) 1.4  H
 
  Absolute Eosinophils (0.0 - 0.7 /CUMM) 0.1
 
  Absolute Basophils (0.0 - 0.2 /CUMM) 0
 
Toxicology 
 
  Serum Alcohol (<10 MG/DL) < 10.0

## 2018-05-06 NOTE — TRANSFER OF CARE SUMMARY
Hospital Course
 
Course
Hospital Course:
Patient was transferred to telemetry floor on 4/30/18 from the ICU is a general 
medicine.  See previous transfer of care for ICU course.
 
Patient was treated for the following medical problems:
 
#Hyponatremia
Patient was initially on a 500 ML fluid restriction.  Daily BEP is showed steady
improvement of hyponatremia.  Fluid restriction was eventually increased to 1000
mL per day, and then stopped altogether.  From 5/1 - 5/6 sodium increased from 
128-137.
 
#Hepatorenal syndrome
Patient is being followed by both GI and nephrology.  Patient was on IV albumin 
infusions 25 mg 3 times a day when transferred.  This was slowly tapered down 
initially by cutting the dose in half to 12.5 mg 3 times a day and then 
decreasing to twice a day and then stopped completely.  Patient has been 
continued on octreotide 100 mcg 3 times a day.  Patient was on metered 10 mg 
twice a day, this was decreased to 5 mg twice a day on 5/4 an order to determine
the continued need based on BP.  While patient has had times of borderline blood
pressure, it does not appear as though she is dependent on midodrine to maintain
BP.  She was started on pentoxifylline 400 mg by mouth 3 times a day on for the 
duration of 28 days, started on 5/5/18.  Renal function has slowly been 
improving.  Trial of stopping octreotide monitoring renal function per 
nephrology.
-Follow up renal function daily
-Continue to follow GI and nephro recommendations
 
 
#Decompensated alcoholic liver cirrhosis
EGD on 5/2/18 revealed stage I esophageal varices.  Patient is not a candidate 
for beta blocker at this time given hepatorenal syndrome.  Patient has 
significant ascites, SBP was initially ruled out during ICU stay.  Patient 
spiked a fever of 101.2 on the evening of 5/5/18.  She was empirically started 
on IV ceftriaxone.
-Follow-up with her visit to the rule out SBP
 
#Acute blood loss anemia
Patient's H/H has remained stable since being transferred out of the ICU.  
Patient received 2 units PRBCs on 4/28/18.
-Continue to follow-up CBC daily
 
Diet: Regular diet
DVT prophylaxis: Subcutaneous heparin
CODE STATUS: Full code 
Assessment/Plan:
See above

## 2018-05-07 VITALS — SYSTOLIC BLOOD PRESSURE: 120 MMHG | DIASTOLIC BLOOD PRESSURE: 72 MMHG

## 2018-05-07 VITALS — SYSTOLIC BLOOD PRESSURE: 108 MMHG | DIASTOLIC BLOOD PRESSURE: 60 MMHG

## 2018-05-07 VITALS — DIASTOLIC BLOOD PRESSURE: 56 MMHG | SYSTOLIC BLOOD PRESSURE: 104 MMHG

## 2018-05-07 VITALS — SYSTOLIC BLOOD PRESSURE: 104 MMHG | DIASTOLIC BLOOD PRESSURE: 56 MMHG

## 2018-05-07 VITALS — DIASTOLIC BLOOD PRESSURE: 64 MMHG | SYSTOLIC BLOOD PRESSURE: 118 MMHG

## 2018-05-07 LAB
ABSOLUTE GRANULOCYTE CT: 6.9 /CUMM (ref 1.4–6.5)
BASOPHILS # BLD: 0.1 /CUMM (ref 0–0.2)
BASOPHILS NFR BLD: 0.7 % (ref 0–2)
EOSINOPHIL # BLD: 0 /CUMM (ref 0–0.7)
EOSINOPHIL NFR BLD: 0.4 % (ref 0–5)
ERYTHROCYTE [DISTWIDTH] IN BLOOD BY AUTOMATED COUNT: 20.5 % (ref 11.5–14.5)
GRANULOCYTES NFR BLD: 70.5 % (ref 42.2–75.2)
HCT VFR BLD CALC: 22.3 % (ref 37–47)
LYMPHOCYTES # BLD: 1.5 /CUMM (ref 1.2–3.4)
MCH RBC QN AUTO: 36.1 PG (ref 27–31)
MCHC RBC AUTO-ENTMCNC: 34.2 G/DL (ref 33–37)
MCV RBC AUTO: 105.5 FL (ref 81–99)
MONOCYTES # BLD: 1.3 /CUMM (ref 0.1–0.6)
PLATELET # BLD: 126 /CUMM (ref 130–400)
PMV BLD AUTO: 7.9 FL (ref 7.4–10.4)
PROTHROMBIN TIME: 21.4 SEC (ref 9.4–12.5)
RED BLOOD CELL CT: 2.11 /CUMM (ref 4.2–5.4)
WBC # BLD AUTO: 9.8 /CUMM (ref 4.8–10.8)

## 2018-05-07 NOTE — PN- HOUSESTAFF
**See Addendum**
Subjective
Follow-up For:
Hepatorenal syndrome, cirrhosis, secondary to alcohol
Subjective:
No overnight events.  Patient had vomiting yesterday but overnight did not have 
any further vomiting.  She slept well.  This morning she has no chest pain, 
shortness of breath, abdominal pain, or other complaints.
 
Review of Systems
Constitutional:
Reports: no symptoms. 
EENTM:
Reports: no symptoms. 
Cardiovascular:
Reports: no symptoms. 
Respiratory:
Reports: no symptoms. 
Gastrointestinal:
Reports: no symptoms. 
Genitourinary:
Reports: no symptoms. 
Musculoskeletal:
Reports: no symptoms. 
Skin:
Reports: no symptoms. 
Neurological/Psychological:
Reports: no symptoms. 
Hematologic/Endocrine:
Reports: no symptoms. 
Immunologic/Allergic:
Reports: no symptoms. 
 
Objective
Last 24 Hrs of Vital Signs/I&O
 Vital Signs
 
 
Date Time Temp Pulse Resp B/P B/P Pulse O2 O2 Flow FiO2
 
     Mean Ox Delivery Rate 
 
 0631 98.9 89 18 104/56  96 Room Air  
 
/ 0223 99.3 100 16 118/64  95 Room Air  
 
/ 2240 99.0 101 18 116/68  97 Room Air  
 
05/ 1458 99.0 98 16 112/64  94   
 
/06 1400 99.0 100 18 118/62     
 
05/06 1000  106 16 130/70     
 
/ 0800       Room Air  
 
/ 0800 98.4 124 20 130/70     
 
 
 Intake & Output
 
 
  0800  0000  1600
 
Intake Total  100 400
 
Output Total   
 
Balance  100 400
 
    
 
Intake, IV   30
 
Intake, Oral  100 370
 
Number  1 1
 
Bowel   
 
Movements   
 
 
 
 
Physical Exam
General Appearance: Alert, Oriented X3, Cooperative, No Acute Distress, 
cachectic
HEENT: jaundiced
Cardiovascular: Regular Rate, Normal S1, Normal S2
Lungs: Clear to Auscultation, Normal Air Movement
Abdomen: distended with fluid wave, nontender
Extremities: No Edema, Normal Pulses, No Tenderness/Swelling
Current Medications:
 Current Medications
 
 
  Sig/Reagan Start time  Last
 
Medication Dose Route Stop Time Status Admin
 
Al Hydroxide/Mg  30 ML Q4-6 PRN PRN  1530 AC 
 
Hydroxide  PO   
 
Ceftriaxone Sodium 1,000 MG DAILY  1000 AC 
 
  IV   1022
 
Folic Acid 1 MG DAILY  0900 AC 
 
  PO   0733
 
Heparin Sodium  5,000 UNIT Q8  2200 AC 
 
(Porcine)  SC   0534
 
Midodrine 5 MG 0800,1200,1600 / 1600 AC 
 
  PO   1709
 
Nicotine 14 MG DAILY AS NEEDED PRN  0730 AC 
 
  TOP   1022
 
Octreotide Acetate 100 MCG TID  0900 DC 
 
  SC   1449
 
Omeprazole 40 MG DAILY AC  0806 AC 
 
  PO   0534
 
Ondansetron HCl 4 MG Q6P PRN  1800 AC 
 
  IV   1807
 
Oxycodone HCl 5 MG Q8P PRN  2115 AC 
 
  PO   2155
 
Oxycodone HCl 5 MG BID PRN  0245 DC 
 
  PO   1232
 
Pentoxifylline 400 MG TID  2100 AC 
 
  PO   2044
 
Thiamine HCl 100 MG 1800  1800 AC 
 
Sodium Chloride 50 ML IV   1709
 
Trimethobenzamide HCl 200 MG ONCE ONE 0 DC 
 
  IM 2201  
 
 
 
 
Last 24 Hrs of Lab/Raghav Results
Last 24 Hrs of Labs/Mics:
 Laboratory Tests
 
18 0900:
Anion Gap 16, Estimated GFR 57  L, BUN/Creatinine Ratio 33.6  H, Total Bilirubin
12.4  H, Direct Bilirubin 8.3  H, AST 68  H, ALT 35, Alkaline Phosphatase 124, 
Total Protein 7.0, Albumin 4.0, PT 21.4  H, INR 1.95  H
 
 
Assessment/Plan
Assessment:
Ms Barros is a 32-year-old female with PMH significant for alcohol use disorder 
who presented due to hyponatremia.  She was initially admitted to the ICU and 
then transferred to general medicine on 18.
 
Problem list:
1.  Fever
2.  Hyponatremia
3.  Acute blood loss anemia secondary to grade 1 esophageal varices and 
duodenitis
4.  Decompensated alcoholic cirrhosis
5.  Hepatorenal syndrome
 
#Fever: Patient had paracentesis about a week ago that was negative for SBP.  
However she had another fever and so we will do another tap today.  Patient does
have decompensated alcoholic cirrhosis.
-Paracentesis, follow labs
-Continue ceftriaxone
-Follow cultures
-Appreciate GI recommendations
 
#Acute blood loss anemia secondary to GI bleed: EGD showed portal hypertensive 
gastropathy, grade 1 esophageal varices and duodenitis.  
- No active bleeding seen on EGD
- H/H remains stable 
 
#Hyponatremia: Sodium was 117 on admission.  Sodium continues to improve, today 
is 137
-continue to monitor BEP daily
 
#Hepatorenal syndrome: Renal ultrasound was rule out obstruction.  Creatinine 
has decreased currently 1.1.  Albumin and octreotide have been discontinued.
-Appreciate nephrology recognitions
-Avoid hepatotoxins and nephrotoxins
 
#Chronic medical problems:
-Continue other home medications
 
DVT prophylaxis with heparin
Heart healthy diet
Full code
 
Problem List:
 1. Hepatorenal syndrome
 
Pain Ratin
Pain Location:
no
Pain Goal: Remain pain free
Pain Plan:
see a/p
Tomorrow's Labs & Rationales:
cbc, bep ,lft, inr

## 2018-05-07 NOTE — PN- GASTROENTEROLOGY
Assessment/Plan GI
Assessment/Recommendations:
Alcoholic cirrhosis/hepatitis.  Improvement in hyperbilirubinemia, stable 
coagulopathy, no overt encephalopathy.  Small varices on endoscopy.  Ascites, 
without SBP (confirmed by today's paracentesis, although the patient was on 
ceftriaxone for 1 day), complicated by BRETT thought to be hepatorenal, and 
hyponatremia (resolved).
 
Recommendations
* The patient did not receive albumin after paracentesis, despite recent acute 
kidney injury.  Please give salt poor albumin 50 g IV.
* Continue midodrine.  Follow electrolytes/renal function carefully.
* Please specify diet as 2 g sodium
* Continue pentoxifylline 400 mg PO TID for a total of 28 days
* If renal function stays stable over the next 48 hours, may be able to 
discharge the patient with careful outpatient follow-up
 
 
 
Subjective
Subjective:
Alert and oriented.  No pain, nausea, fever.
 
Objective
Vital Signs and I&Os
Vital Signs
 
 
Date Time Temp Pulse Resp B/P B/P Pulse O2 O2 Flow FiO2
 
     Mean Ox Delivery Rate 
 
05/07 1600      94 Room Air  
 
05/07 1400 98.9 89 20 104/56     
 
05/07 1200 98.5 92 18 120/72     
 
05/07 1000 98.5 92 16 120/72     
 
05/07 0800 98.9 89 18 104/56     
 
05/07 0631 98.9 89 18 104/56  96 Room Air  
 
05/07 0223 99.3 100 16 118/64  95 Room Air  
 
05/06 2240 99.0 101 18 116/68  97 Room Air  
 
 
 Intake & Output
 
 
 05/07 1600 05/07 0400 05/06 1600 05/06 0400 05/05 1600 05/05 0400
 
Intake Total 100 100 700 431 402 100
 
Output Total      
 
Balance 100 100 700 431 402 100
 
       
 
Intake, IV   30 71 80 100
 
Intake, Oral 100 100 670 360 322 
 
Number  1 2  1 
 
Bowel      
 
Movements      
 
Patient 117 lb   121 lb  124 lb
 
Weight      
 
Weight    Bed scale  
 
Measurement      
 
Method      
 
 
 
Physical Exam:
Alert and oriented, no asterixis.  Sclera and skin icteric.  Abdomen flat status
post 5 L paracentesis; no tenderness, no leak at paracentesis site.  No 
peripheral edema.
Current Medications:
 Current Medications
 
 
  Sig/Reagan Start time  Last
 
Medication Dose Route Stop Time Status Admin
 
Al Hydroxide/Mg  30 ML Q4-6 PRN PRN 05/04 1530 AC 
 
Hydroxide  PO   
 
Ceftriaxone Sodium 1,000 MG DAILY 05/06 1000 AC 05/07
 
  IV   0857
 
Folic Acid 1 MG DAILY 04/29 0900 AC 05/07
 
  PO   0857
 
Heparin Sodium  5,000 UNIT Q8 04/30 2200 AC 05/07
 
(Porcine)  SC   1324
 
Lidocaine 20 ML .STK-MED ONE 05/07 1537 DC 
 
  ID 05/07 1538  
 
Midodrine 5 MG 0800,1200,1600 05/04 1600 AC 05/07
 
  PO   1748
 
Nicotine 14 MG DAILY AS NEEDED PRN 04/29 0730 AC 05/06
 
  TOP   1022
 
Omeprazole 40 MG DAILY AC 05/03 0806 AC 05/07
 
  PO   0534
 
Ondansetron HCl 4 MG Q6P PRN 05/06 1800 AC 05/06
 
  IV   1807
 
Oxycodone HCl 5 MG Q8P PRN 05/06 2115 AC 05/07
 
  PO   1351
 
Oxycodone HCl 5 MG BID PRN 04/29 0245 DC 05/06
 
  PO   1232
 
Pentoxifylline 400 MG TID 05/05 2100 AC 05/07
 
  PO   1328
 
Thiamine HCl 100 MG 1800 04/29 1800 AC 05/07
 
Sodium Chloride 50 ML IV   1749
 
Trimethobenzamide HCl 200 MG ONCE ONE 05/06 2200 DC 
 
  IM 05/06 2201  
 
 
 
 
Results
Pertinent Lab Results:
 Laboratory Tests
 
 
 05/07 05/07 05/07
 
 1435 1435 UNK
 
Chemistry   
 
  Albumin   Cancelled
 
Hematology   
 
  Lymphocytes (%)  18 
 
  % Normal PMNs (%)  2 
 
Other Body Source   
 
  Fluid WBC (0 - 5 /CUMM) 415  H  
 
  Fld Mesothelial Cells (%)     
 
  Fld Total RBCs Counted (0 /CUMM) 179  H  
 
  Fluid Glucose (mg/dL)  112 
 
  Fluid Total Protein (g/dL)  3.2 
 
  Fluid Albumin (g/dL)  1.7 
 
  Fluid LDH (U/L)  222 
 
  Fluid Amylase (U/L)  127 
 
 
 
 
 05/07 05/06
 
 0630 0900
 
Chemistry  
 
  Sodium (137 - 145 mmol/L) 137 137
 
  Potassium (3.5 - 5.1 mmol/L) 3.8 4.0
 
  Chloride (98 - 107 mmol/L) 96  L 95  L
 
  Carbon Dioxide (22 - 30 mmol/L) 26 25
 
  Anion Gap (5 - 16) 15 16
 
  BUN (7 - 17 mg/dL) 35  H 37  H
 
  Creatinine (0.5 - 1.0 mg/dL) 1.1  H 1.1  H
 
  Estimated GFR (>60 ml/min) 57  L 57  L
 
  BUN/Creatinine Ratio (7 - 25 %) 31.8  H 33.6  H
 
  Total Bilirubin (0.2 - 1.3 mg/dL) 9.8  H 12.4  H
 
  Direct Bilirubin (< 0.4 mg/dL) 6.6  H 8.3  H
 
  AST (14 - 36 U/L) 63  H 68  H
 
  ALT (9 - 52 U/L) 31 35
 
  Alkaline Phosphatase (<127 U/L) 108 124
 
  Total Protein (6.3 - 8.2 g/dL) 6.5 7.0
 
  Albumin (3.5 - 5.0 g/dL) 3.7 4.0
 
Coagulation  
 
  PT (9.4 - 12.5 SEC) 21.4  H 21.4  H
 
  INR (0.90 - 1.19) 1.95  H 1.95  H
 
Hematology  
 
  CBC w Diff NO MAN DIFF REQ 
 
  WBC (4.8 - 10.8 /CUMM) 9.8 
 
  RBC (4.20 - 5.40 /CUMM) 2.11  L 
 
  Hgb (12.0 - 16.0 G/DL) 7.6  L 
 
  Hct (37 - 47 %) 22.3  L 
 
  MCV (81.0 - 99.0 FL) 105.5  H 
 
  MCH (27.0 - 31.0 PG) 36.1  H 
 
  MCHC (33.0 - 37.0 G/DL) 34.2 
 
  RDW (11.5 - 14.5 %) 20.5  H 
 
  Plt Count (130 - 400 /CUMM) 126  L 
 
  MPV (7.4 - 10.4 FL) 7.9 
 
  Gran % (42.2 - 75.2 %) 70.5 
 
  Lymphocytes % (20.5 - 51.1 %) 14.9  L 
 
  Monocytes % (1.7 - 9.3 %) 13.5  H 
 
  Eosinophils % (0 - 5 %) 0.4 
 
  Basophils % (0.0 - 2.0 %) 0.7 
 
  Absolute Granulocytes (1.4 - 6.5 /CUMM) 6.9  H 
 
  Absolute Lymphocytes (1.2 - 3.4 /CUMM) 1.5 
 
  Absolute Monocytes (0.10 - 0.60 /CUMM) 1.3  H 
 
  Absolute Eosinophils (0.0 - 0.7 /CUMM) 0 
 
  Absolute Basophils (0.0 - 0.2 /CUMM) 0.1 
 
 
 
 
 05/05
 
 0654
 
Chemistry 
 
  Sodium (137 - 145 mmol/L) 137
 
  Potassium (3.5 - 5.1 mmol/L) 3.9
 
  Chloride (98 - 107 mmol/L) 96  L
 
  Carbon Dioxide (22 - 30 mmol/L) 27
 
  Anion Gap (5 - 16) 14
 
  BUN (7 - 17 mg/dL) 36  H
 
  Creatinine (0.5 - 1.0 mg/dL) 1.2  H
 
  Estimated GFR (>60 ml/min) 51  L
 
  BUN/Creatinine Ratio (7 - 25 %) 30.0  H
 
  Total Bilirubin (0.2 - 1.3 mg/dL) 12.1  H
 
  Direct Bilirubin (< 0.4 mg/dL) 7.7  H
 
  AST (14 - 36 U/L) 54  H
 
  ALT (9 - 52 U/L) 27
 
  Alkaline Phosphatase (<127 U/L) 121
 
  Total Protein (6.3 - 8.2 g/dL) 6.3
 
  Albumin (3.5 - 5.0 g/dL) 3.9
 
Coagulation 
 
  PT (9.4 - 12.5 SEC) 19.4  H
 
  INR (0.90 - 1.19) 1.77  H
 
Hematology 
 
  CBC w Diff NO MAN DIFF REQ
 
  WBC (4.8 - 10.8 /CUMM) 8.7
 
  RBC (4.20 - 5.40 /CUMM) 2.39  L
 
  Hgb (12.0 - 16.0 G/DL) 8.4  L
 
  Hct (37 - 47 %) 25.0  L
 
  MCV (81.0 - 99.0 FL) 104.8  H
 
  MCH (27.0 - 31.0 PG) 35.3  H
 
  MCHC (33.0 - 37.0 G/DL) 33.7
 
  RDW (11.5 - 14.5 %) 20.9  H
 
  Plt Count (130 - 400 /CUMM) 132
 
  MPV (7.4 - 10.4 FL) 7.8
 
  Gran % (42.2 - 75.2 %) 66.8
 
  Lymphocytes % (20.5 - 51.1 %) 17.9  L
 
  Monocytes % (1.7 - 9.3 %) 13.2  H
 
  Eosinophils % (0 - 5 %) 1.6
 
  Basophils % (0.0 - 2.0 %) 0.5
 
  Absolute Granulocytes (1.4 - 6.5 /CUMM) 5.8
 
  Absolute Lymphocytes (1.2 - 3.4 /CUMM) 1.6
 
  Absolute Monocytes (0.10 - 0.60 /CUMM) 1.1  H
 
  Absolute Eosinophils (0.0 - 0.7 /CUMM) 0.1
 
  Absolute Basophils (0.0 - 0.2 /CUMM) 0

## 2018-05-07 NOTE — ULTRASOUND REPORT
CLINICAL HISTORY:
This patient is a 32 years old female with ascites found on physical exam,
who is referred to Interventional Radiology for ultrasound-guided
paracentesis.
 
PROCEDURE:
Ultrasound-guided paracentesis.
 
PHYSICIANS:
Dr. Kayleigh Dixon (attending). Dr. Perez (resident). The attending
radiologist was present during the procedure and related imaging, and
reviewed the report.
 
MEDICATIONS:
5 mL of 1% lidocaine SQ.
 
COMPLICATIONS: None
ESTIMATED BLOOD LOSS: <5 mL
SPECIMENS: None
IMPLANT: None.
 
SITE MARKING:
As part of the preprocedure verification policy, a site marking procedure was
initiated. Due to the nature the procedure, the insertion site could not be
predetermined thus invoking the policy of exemption to site laterality and
marking. Insertion site marking was performed in the procedure room in
conjunction with imaging confirmation.
 
PROCEDURE NOTE:
Informed consent was obtained from the patient prior to the procedure. During
this process, the procedure and potential alternatives were explained along
with the intended outcome and benefits. The risks of the procedure, including
the possibility of an unsuccessful procedure, as well as the risk of not
doing the procedure, were discussed. The patient was given the opportunity to
ask questions regarding the procedure and appeared competent to make
decisions. A signed consent form documenting this discussion was placed in
the medical record. A time-out procedure was performed.
 
Appropriate preprocedure medical history and imaging studies were reviewed.
The patient was brought to the ultrasound room and placed in the supine
position. A time-out procedure was performed. Ultrasound images of the
abdomen were obtained to localize a large collection of ascites. Images were
permanently saved to the record.
 
An area of the right lower quadrant was prepped and draped in the standard
sterile fashion. All elements of maximal sterile barrier technique followed
including use of cap, mask, sterile gown, sterile gloves, a sterile full body
drape and hand hygiene. Also followed skin preparation with 2% chlorhexidine
for cutaneous antisepsis, and sterile ultrasound preparation with sterile gel
and probe cover when applicable. 10 mL of 1% lidocaine was used to obtain
local anesthesia of the skin and deeper tissues. A standard small-bore needle
was introduced to sample fluid and demonstrated a safe access route. There
was no evidence of traversing adjacent organs or vascular structures. A 6-Fr
Safe-T-CentLeBUZZ closed needle/catheter system was utilized for access.  5.2
of clear bright yellow fluid was aspirated before drainage ceased. The
catheter was removed and sterile dressing applied.
 
The patient tolerated the procedure well without evidence of complications.
 
FINDINGS:
Large simple abdominal ascites as detailed above.
 
IMPRESSION:
Successful ultrasound-guided therapeutic and diagnostic paracentesis.
 
PLAN:
The patient was stable after the procedure. The patient will be transferred
to the floor.

## 2018-05-07 NOTE — PN- STUDENT
Subjective
Subjective:
No overnight events reported. Patient was transfered from [facility] over the weekend.
She reports abdominal distension x1 month, extensive ecchymosis over the right 
upper extremity from blood draw attempt in the ED 1 wk ago, and jaundice. She 
reports that she believes she will be having a paracentesis today due to the 
abdominal distension and fever she had over the weekend. She adds that she had 
this procedure done before and believes it was 3 months ago when the abdomen was
last drained.
 
Objective
Objective:
 Current Medications
 
 
  Sig/Reagan Start time  Last
 
Medication Dose Route Stop Time Status Admin
 
Al Hydroxide/Mg  30 ML Q4-6 PRN PRN 05/04 1530 AC 
 
Hydroxide  PO   
 
Ceftriaxone Sodium 1,000 MG DAILY 05/06 1000 AC 05/07
 
  IV   0857
 
Folic Acid 1 MG DAILY 04/29 0900 AC 05/07
 
  PO   0857
 
Heparin Sodium  5,000 UNIT Q8 04/30 2200 AC 05/07
 
(Porcine)  SC   0534
 
Midodrine 5 MG 0800,1200,1600 05/04 1600 AC 05/07
 
  PO   0855
 
Nicotine 14 MG DAILY AS NEEDED PRN 04/29 0730 AC 05/06
 
  TOP   1022
 
Octreotide Acetate 100 MCG TID 04/29 0900 DC 05/06
 
  SC   1449
 
Omeprazole 40 MG DAILY AC 05/03 0806 AC 05/07
 
  PO   0534
 
Ondansetron HCl 4 MG Q6P PRN 05/06 1800 AC 05/06
 
  IV   1807
 
Oxycodone HCl 5 MG Q8P PRN 05/06 2115 AC 05/06
 
  PO   2155
 
Oxycodone HCl 5 MG BID PRN 04/29 0245 DC 05/06
 
  PO   1232
 
Pentoxifylline 400 MG TID 05/05 2100 AC 05/07
 
  PO   0857
 
Thiamine HCl 100 MG 1800 04/29 1800 AC 05/06
 
Sodium Chloride 50 ML IV   1709
 
Trimethobenzamide HCl 200 MG ONCE ONE 05/06 2200 DC 
 
  IM 05/06 2201  
 
 
 Laboratory Tests
 
 
 05/07 0630
 
Chemistry 
 
  Sodium (137 - 145 mmol/L) 137
 
  Potassium (3.5 - 5.1 mmol/L) 3.8
 
  Chloride (98 - 107 mmol/L) 96  L
 
  Carbon Dioxide (22 - 30 mmol/L) 26
 
  Anion Gap (5 - 16) 15
 
  BUN (7 - 17 mg/dL) 35  H
 
  Creatinine (0.5 - 1.0 mg/dL) 1.1  H
 
  Estimated GFR (>60 ml/min) 57  L
 
  BUN/Creatinine Ratio (7 - 25 %) 31.8  H
 
  Total Bilirubin (0.2 - 1.3 mg/dL) 9.8  H
 
  Direct Bilirubin (< 0.4 mg/dL) 6.6  H
 
  AST (14 - 36 U/L) 63  H
 
  ALT (9 - 52 U/L) 31
 
  Alkaline Phosphatase (<127 U/L) 108
 
  Total Protein (6.3 - 8.2 g/dL) 6.5
 
  Albumin (3.5 - 5.0 g/dL) 3.7
 
Coagulation 
 
  PT (9.4 - 12.5 SEC) 21.4  H
 
  INR (0.90 - 1.19) 1.95  H
 
Hematology 
 
  CBC w Diff NO MAN DIFF REQ
 
  WBC (4.8 - 10.8 /CUMM) 9.8
 
  RBC (4.20 - 5.40 /CUMM) 2.11  L
 
  Hgb (12.0 - 16.0 G/DL) 7.6  L
 
  Hct (37 - 47 %) 22.3  L
 
  MCV (81.0 - 99.0 FL) 105.5  H
 
  MCH (27.0 - 31.0 PG) 36.1  H
 
  MCHC (33.0 - 37.0 G/DL) 34.2
 
  RDW (11.5 - 14.5 %) 20.5  H
 
  Plt Count (130 - 400 /CUMM) 126  L
 
  MPV (7.4 - 10.4 FL) 7.9
 
  Gran % (42.2 - 75.2 %) 70.5
 
  Lymphocytes % (20.5 - 51.1 %) 14.9  L
 
  Monocytes % (1.7 - 9.3 %) 13.5  H
 
  Eosinophils % (0 - 5 %) 0.4
 
  Basophils % (0.0 - 2.0 %) 0.7
 
  Absolute Granulocytes (1.4 - 6.5 /CUMM) 6.9  H
 
  Absolute Lymphocytes (1.2 - 3.4 /CUMM) 1.5
 
  Absolute Monocytes (0.10 - 0.60 /CUMM) 1.3  H
 
  Absolute Eosinophils (0.0 - 0.7 /CUMM) 0
 
  Absolute Basophils (0.0 - 0.2 /CUMM) 0.1
 
 
 Microbiology
 
 
Date/Time Procedure - Status
 
Source Growth
 
05/07 0922 Body Fluid Culture - ORD
 
BODY FLUID 
 
05/07 0922 Gram Stain - ORD
 
BODY FLUID 
 
 
 Vital Signs
 
 
Date Time Temp Pulse Resp B/P B/P Pulse O2 O2 Flow FiO2
 
     Mean Ox Delivery Rate 
 
05/07 0631 98.9 89 18 104/56  96 Room Air  
 
05/07 0223 99.3 100 16 118/64  95 Room Air  
 
05/06 2240 99.0 101 18 116/68  97 Room Air  
 
05/06 1458 99.0 98 16 112/64  94   
 
05/06 1400 99.0 100 18 118/62     
 
 
 Intake & Output
 
 
 05/07 1600 05/07 0800 05/07 0000
 
Intake Total  100 100
 
Output Total   
 
Balance  100 100
 
    
 
Intake, Oral  100 100
 
Number   1
 
Bowel   
 
Movements   
 
 
PHYSICAL EXAM
Gen apperance: no evidence of acute distress, thin with large abdominal 
distension, laying flat in bed, jaundice, AOx3
HEENT: scleral icterus bilaterally
CV: tachycardic, regular rhythm, normal S1&S2, no MRG
pulm: slight inspiratory wheeze throughout posterior lung fields
abdomen: distended, rigid, non-tender, dull on percusion, warm to touch, unable 
to appreciate abdominal sounds
Extremities: extensive ecchymoses present over right upper extremity, jaundice, 
thin extremities
 
 
Assessment/Plan
Assessment:
Ms. Barros is a 32 year old female with a PMHx significant for hepatorenal 
syndrome, and cirrhosis secondar to ETOH abuse, presented to the hospital with 
abdominal pain and distension. She was subsequently admited to the ICU on 4/28/
18 due to abnormal lab findings of hyponatremia, and hypoalbumenemia. She was 
then treated for hyponatremia by restricting her fluid intake and her sodium 
levels have improved. She was then transfered to the telemetry unit on 4/30/18. 
She also had EGD done on 5/2/18 which revealed stage 1 esophageal varcies. She 
was transfered to the general medicine floor on 5/6/18.
Plan:
Problem list:
1. Fever
2. Hyponatremia
3. Hypoalbuminemia
3. Macrocytic Anemia 
4. Alcoholic cirrhosis
5. Hepatorenal syndrome
6. Chronic Medical conditions
 
#Fever: Patient has had low grade fever as well as feeling warm to the touch on 
physical exam especially the abdomen. Patient has cirrhosis leading to ascites, 
evident by the abdominal distension. As her abdomen feels warm to the touch this
is likely the source of the patient's fever. She will be undergoing paracentesis
today to further investigate probable Spontaneous Bacterial Peritonitis. This is
unlikely to be infection of another origin, she does not have respiratory 
symptoms which being in the hospital since 4/28 she would be at risk for 
hospital acquired pneumonia. It is also unlikely that this fever is a result of 
infusion reaction to the albumin IV she recieved as this was stopped on May 5. 
Furthermore she does have extensive bruising over her right upper extremity as 
the result of an attempted blood draw but it is unlikely that phlebitis is 
causing this fever.
-Paracentesis to be done by IR today
-drain abdominal fluid 
-Peritoneal fluid analysis- cytology and culture
 
#hyponatremia: Now resolved, patient came in with hyponatermia findings on lab 
work up. Was fluid restricted and lab values improved. Likely a result of 
patients increased pressure of the portal system and decreased perfusion to 
kidneys that resulted in kidney injury and as a result decreased sodium. May 
also be due to decreased perfusion as a result of dilated cardiomyopathy 
secondary to alcohol abuse, however nothing in her PMHx indicates a history of 
dilated cardiomyopathy. This also may have been contributed to by patient being 
on Octreotide for esophageal varcies.
-continue to monitor 
 
#hypoalbuminemia: Patient has cirrhosis secondary to alcohol abuse. Patient had 
low albumin levels when first admited to the hospital. Patient recieved IV 
albumin and albumin levels corrected. This is likely a result of the patients 
liver damage, as she also has abnormal bleeding studies on lab work up as well. 
Patient has physical exam findings of ascites which is likely due to decreased 
oncotic pressure provided by albumin. Poor diet may also factor in to this but, 
almost certainly a result of liver damage due to ETOH abuse that lead to 
decreased albumin. 
-continue to monitor albumin levels
-paracentesis 
 
#Macrocytic anemia: Patient has a history of liver disease as a result of 
alcohol abuse. This is commonly a cause of macrocytic anemia. Patient's diet may
also play a factor and may have vitamin B12 deficiency as a result of poor 
nutrition. Patient also has a history of esophageal varcies however there does 
not appear to be any active bleeding to be contribuiting to the anemia.
-MMA and B12 level
 
#Alcoholic Cirrhosis: Patient has a history of known alcohol induced cirrhosis. 
This is likely contributing to her overall clinical presentation. She was on 
octreotide for the esophageal varcies however this was stopped per nephrology 
recommendations. Patient also recieving Pentoxifylline and Midorine to increase 
perfusion as patient's cirrhotic liver has lead to portal hypertension and 
subsequent development of hepatorenal syndrome. 
-appreciate GI and nephrology recomendations
-avoid hepatotoxins
-paracentesis
-continue pentoxifylline and midorine
-continue IV thiamine 
 
#Hepatorenal syndrome: patient presented with hyponatremia, and ascites. Patient
has been being followed by GI and Nephrology for this issue over the course of 
her hospitalization. Sodium levels have improved with fluid restriction. Another
contributing factor is likely the loss of fluid volume in the vascular 
compartment into the peritoneal cavity, contributing to a hypovolemic state and 
pre-renal like presentation. After administration of IV albumin and restricting 
fluids the sodium levels have corrected. The addition of pentoxifylline and 
midorine also likely contributed to increasing perfusion to the kidneys. Patient
will have paracentesis today to drain the fluid from her abdomen.
-continue pentoxifylline and midorine
-appreciate GI and nephrology recommendations
-avoid hepato/nephrotoxins
-continue to monitor CMP
 
#chronic medical conditions:
-continue to observe 
-take all medications as prescribed
 
DVT ppx Heparin
Heart healthy diet
full code

## 2018-05-08 VITALS — DIASTOLIC BLOOD PRESSURE: 60 MMHG | SYSTOLIC BLOOD PRESSURE: 100 MMHG

## 2018-05-08 VITALS — DIASTOLIC BLOOD PRESSURE: 60 MMHG | SYSTOLIC BLOOD PRESSURE: 106 MMHG

## 2018-05-08 VITALS — DIASTOLIC BLOOD PRESSURE: 70 MMHG | SYSTOLIC BLOOD PRESSURE: 100 MMHG

## 2018-05-08 VITALS — SYSTOLIC BLOOD PRESSURE: 106 MMHG | DIASTOLIC BLOOD PRESSURE: 60 MMHG

## 2018-05-08 VITALS — DIASTOLIC BLOOD PRESSURE: 60 MMHG | SYSTOLIC BLOOD PRESSURE: 104 MMHG

## 2018-05-08 VITALS — SYSTOLIC BLOOD PRESSURE: 120 MMHG | DIASTOLIC BLOOD PRESSURE: 60 MMHG

## 2018-05-08 LAB
ABSOLUTE GRANULOCYTE CT: 6.7 /CUMM (ref 1.4–6.5)
BASOPHILS # BLD: 0 /CUMM (ref 0–0.2)
BASOPHILS NFR BLD: 0.3 % (ref 0–2)
EOSINOPHIL # BLD: 0 /CUMM (ref 0–0.7)
EOSINOPHIL NFR BLD: 0.2 % (ref 0–5)
ERYTHROCYTE [DISTWIDTH] IN BLOOD BY AUTOMATED COUNT: 20 % (ref 11.5–14.5)
GRANULOCYTES NFR BLD: 76.6 % (ref 42.2–75.2)
HCT VFR BLD CALC: 23.5 % (ref 37–47)
LYMPHOCYTES # BLD: 1 /CUMM (ref 1.2–3.4)
MCH RBC QN AUTO: 35.5 PG (ref 27–31)
MCHC RBC AUTO-ENTMCNC: 34.2 G/DL (ref 33–37)
MCV RBC AUTO: 103.8 FL (ref 81–99)
MONOCYTES # BLD: 1 /CUMM (ref 0.1–0.6)
PLATELET # BLD: 106 /CUMM (ref 130–400)
PMV BLD AUTO: 8.9 FL (ref 7.4–10.4)
PROTHROMBIN TIME: 24 SEC (ref 9.4–12.5)
RED BLOOD CELL CT: 2.26 /CUMM (ref 4.2–5.4)
WBC # BLD AUTO: 8.8 /CUMM (ref 4.8–10.8)

## 2018-05-08 NOTE — PN- HOUSESTAFF
Jeanine Qureshi 18 0716:
Subjective
Follow-up For:
#Fever
#Hyponatremia
#Acute blood loss anemia secondary to grade 1 esophageal varices and duodenitis
#Decompensated alcoholic cirrhosis s/p 5L paracentesis POD 1
#Hepatorenal syndrome
#Thrombocytopenia
Subjective:
No acute events overnight. Patient denies fever, chills, abdominal pain, nausea,
vomiting. She reports a cough with yellowish sputum production.
 
Review of Systems
Constitutional:
Reports: see HPI. 
 
Objective
Last 24 Hrs of Vital Signs/I&O
 Vital Signs
 
 
Date Time Temp Pulse Resp B/P B/P Pulse O2 O2 Flow FiO2
 
     Mean Ox Delivery Rate 
 
 0621 98.3 96 20 106/60  100 Room Air  
 
 0600 98.3 96 20 106/60     
 
08 0200 98.4 88 20 104/60     
 
/07 2200 98.4 86 20 108/60     
 
/ 2154 98.4 86 20 108/60  96   
 
/ 1600      94 Room Air  
 
 1400 98.9 89 20 104/56     
 
/07 1200 98.5 92 18 120/72     
 
/07 1000 98.5 92 16 120/72     
 
 
 Intake & Output
 
 
  1600 /08 0800  0000
 
Intake Total  340 250
 
Output Total   
 
Balance  340 250
 
    
 
Intake, IV  100 10
 
Intake, Oral  240 240
 
Number  2 
 
Bowel   
 
Movements   
 
 
 
 
Physical Exam
General Appearance: Alert, Oriented X3, Cooperative, No Acute Distress
Skin: jaundice
HEENT: sclera icteric
Neck: Supple, No JVD, No thryomegaly
Cardiovascular: Regular Rate, Normal S1, Normal S2
Lungs: BL coarse breath sounds
Abdomen: Normal Bowel Sounds, Soft, No Tenderness
Current Medications:
 Current Medications
 
 
  Sig/Reagan Start time  Last
 
Medication Dose Route Stop Time Status Admin
 
Al Hydroxide/Mg  30 ML Q4-6 PRN PRN  1530 AC 
 
Hydroxide  PO   
 
Albumin Human 50 GM ONCE  DC 
 
  IV  033  0458
 
Benzonatate 100 MG TID PRN 2045 AC 
 
  PO   0501
 
Ceftriaxone Sodium 1,000 MG DAILY 900 UNVr 
 
  IV   
 
Ceftriaxone Sodium 2,000 MG DAILY 900 DC 
 
  IV   
 
Ceftriaxone Sodium 1,000 MG DAILY  1000 DC 
 
  IV   0857
 
Folic Acid 1 MG DAILY 900 AC 
 
  PO   0857
 
Heparin Sodium  5,000 UNIT Q8  2200 AC 
 
(Porcine)  SC   0502
 
Lidocaine 20 ML .STK-MED ONE  1537 DC 
 
  ID  1538  
 
Midodrine 5 MG 0800,1200,1600  1600 AC 
 
  PO   1748
 
Nicotine 14 MG DAILY AS NEEDED PRN  0730 AC 
 
  TOP   1022
 
Omeprazole 40 MG DAILY AC  0806 AC 
 
  PO   0502
 
Ondansetron HCl 4 MG .STK-MED ONE  2159 DC 
 
  IM  220  
 
Ondansetron HCl 4 MG Q6P PRN  1800 AC 
 
  IV   2201
 
Oxycodone HCl 5 MG Q8P PRN  211 AC 
 
  PO   2257
 
Pentoxifylline 400 MG TID  2100 AC 
 
  PO   203
 
Thiamine HCl 100 MG 1800  1800 AC 
 
Sodium Chloride 50 ML IV   1749
 
 
 
 
Last 24 Hrs of Lab/Raghav Results
Last 24 Hrs of Labs/Mics:
 Laboratory Tests
 
18 0705:
Anion Gap 15, Estimated GFR > 60, BUN/Creatinine Ratio 33.3  H, Total Bilirubin 
8.0  H, Direct Bilirubin 5.3  H, AST 50  H, ALT 30, Alkaline Phosphatase 91, 
Total Protein 6.4, Albumin 4.0, PT 24.0  H, INR 2.18  H, CBC w Diff NO MAN DIFF 
REQ, RBC 2.26  L, .8  H, MCH 35.5  H, MCHC 34.2, RDW 20.0  H, MPV 8.9, 
Gran % 76.6  H, Lymphocytes % 11.3  L, Monocytes % 11.6  H, Eosinophils % 0.2, 
Basophils % 0.3, Absolute Granulocytes 6.7  H, Absolute Lymphocytes 1.0  L, 
Absolute Monocytes 1.0  H, Absolute Eosinophils 0, Absolute Basophils 0
 
18 1435:
Fluid   H, Fld Mesothelial Cells   , Fld Total RBCs Counted 179  H
 
18 1435:
Lymphocytes 18, % Normal PMNs 2, Fluid Glucose 112, Fluid Total Protein 3.2, 
Fluid Albumin 1.7, Fluid , Fluid Amylase 127
 
18 1000:
Albumin Cancelled
 Microbiology
 1435  BODY FLUID: Body Fluid Culture - RES
1435  BODY FLUID: Gram Stain - RES
 
 
 
Assessment/Plan
Assessment:
Ms. Barros is a 32-year-old female with past medical history significant for 
alcohol use, cirrhosis who was admitted with acute hyponatremia, decompensated 
liver cirrhosis with acute renal failure likely secondary to hepatorenal 
syndrome, acute blood loss anemia.
 
Problem list:
#Fever
#Hyponatremia
#Acute blood loss anemia secondary to grade 1 esophageal varices and duodenitis
#Decompensated alcoholic cirrhosis s/p 5L paracentesis POD 1
#Hepatorenal syndrome
#Thrombocytopenia
 
Plan:
Monitor low platelets
Continue IV Ceftriaxone, will transition to Ciprofloxacin in the morning
Continue Midodrine
IV Albumin 25 gm x 1 dose given
Continue pentoxifylline 400 mg PO TID for a total of 28 days as per GI
We will monitor renal function
Will not discharge patient on Rifaximin or Lactulose because patient mental 
status intact and will cause diarrhea and worsen renal function
Appreciate GI recommendations
 
Diet: Na restriction
DVT ppx: 
Code: FULL
 
Dispo: Home tomorrow morning with GI follow up
 
Problem List:
 1. Ascites due to alcoholic cirrhosis
 
 2. Alcoholic hepatitis
 
 3. Alcohol abuse
 
Pain Ratin
Pain Location:
NA
Pain Goal: Remain pain free
Pain Plan:
NA
Tomorrow's Labs & Rationales:
none
 
 
Leah Barlow MD 18 1027:
Attending MD Review Statement
 
Attending Statement
Attending MD Statement: examined this patient, discuss w/resident/PA/NP, agreed 
w/resident/PA/NP, reviewed EMR data (avail), discussed with nursing, discussed 
with case mgmt, reviewed images, amended to note
Attending Assessment/Plan:
Patient seen and examined, says she is feeling better. S/P 5 lit of Paracentesis
yesterday. Neg for SBP. 
 
Vital Signs
 
 
Date Time Temp Pulse Resp B/P B/P Pulse O2 O2 Flow FiO2
 
     Mean Ox Delivery Rate 
 
 0621 98.3 96 20 106/60  100 Room Air  
 
 0600 98.3 96 20 106/60     
 
 0200 98.4 88 20 104/60     
 
 2200 98.4 86 20 108/60     
 
 2154 98.4 86 20 108/60  96   
 
/ 1600      94 Room Air  
 
 1400 98.9 89 20 104/56     
 
 1200 98.5 92 18 120/72     
 
 
on exam; 
aox3, nad. + jaundice
cv; s1,s2, rrr
resp; clear
abd; soft, nt, bs+
ext; no edema
 
 Laboratory Tests
 
 
 
 
 0705 1435 1435
 
Chemistry   
 
  Sodium (137 - 145 mmol/L) 137  
 
  Potassium (3.5 - 5.1 mmol/L) 3.8  
 
  Chloride (98 - 107 mmol/L) 95  L  
 
  Carbon Dioxide (22 - 30 mmol/L) 26  
 
  Anion Gap (5 - 16) 15  
 
  BUN (7 - 17 mg/dL) 30  H  
 
  Creatinine (0.5 - 1.0 mg/dL) 0.9  
 
  Estimated GFR (>60 ml/min) > 60  
 
  BUN/Creatinine Ratio (7 - 25 %) 33.3  H  
 
  Total Bilirubin (0.2 - 1.3 mg/dL) 8.0  H  
 
  Direct Bilirubin (< 0.4 mg/dL) 5.3  H  
 
  AST (14 - 36 U/L) 50  H  
 
  ALT (9 - 52 U/L) 30  
 
  Alkaline Phosphatase (<127 U/L) 91  
 
  Total Protein (6.3 - 8.2 g/dL) 6.4  
 
  Albumin (3.5 - 5.0 g/dL) 4.0  
 
Coagulation   
 
  PT (9.4 - 12.5 SEC) 24.0  H  
 
  INR (0.90 - 1.19) 2.18  H  
 
Hematology   
 
  CBC w Diff NO MAN DIFF REQ  
 
  WBC (4.8 - 10.8 /CUMM) 8.8  
 
  RBC (4.20 - 5.40 /CUMM) 2.26  L  
 
  Hgb (12.0 - 16.0 G/DL) 8.0  L  
 
  Hct (37 - 47 %) 23.5  L  
 
  MCV (81.0 - 99.0 FL) 103.8  H  
 
  MCH (27.0 - 31.0 PG) 35.5  H  
 
  MCHC (33.0 - 37.0 G/DL) 34.2  
 
  RDW (11.5 - 14.5 %) 20.0  H  
 
  Plt Count (130 - 400 /CUMM) 106  L  
 
  MPV (7.4 - 10.4 FL) 8.9  
 
  Gran % (42.2 - 75.2 %) 76.6  H  
 
  Lymphocytes % (20.5 - 51.1 %) 11.3  L  
 
  Monocytes % (1.7 - 9.3 %) 11.6  H  
 
  Eosinophils % (0 - 5 %) 0.2  
 
  Basophils % (0.0 - 2.0 %) 0.3  
 
  Absolute Granulocytes (1.4 - 6.5 /CUMM) 6.7  H  
 
  Absolute Lymphocytes (1.2 - 3.4 /CUMM) 1.0  L  
 
  Lymphocytes (%)   18
 
  Absolute Monocytes (0.10 - 0.60 /CUMM) 1.0  H  
 
  Absolute Eosinophils (0.0 - 0.7 /CUMM) 0  
 
  Absolute Basophils (0.0 - 0.2 /CUMM) 0  
 
  % Normal PMNs (%)   2
 
Other Body Source   
 
  Fluid WBC (0 - 5 /CUMM)  415  H 
 
  Fld Mesothelial Cells (%)     
 
  Fld Total RBCs Counted (0 /CUMM)  179  H 
 
  Fluid Glucose (mg/dL)   112
 
  Fluid Total Protein (g/dL)   3.2
 
  Fluid Albumin (g/dL)   1.7
 
  Fluid LDH (U/L)   222
 
  Fluid Amylase (U/L)   127
 
 
A/P: 32-year-old female with past medical history significant for alcohol use, 
cirrhosis who was admitted with acute hyponatremia, decompensated liver 
cirrhosis with acute renal failure likely secondary to hepatorenal syndrome, 
acute blood loss anemia.
 
Status post 5 L plus off paracentesis yesterday.  Negative for SBP.  Patient 
received albumin afterwards.  Creatinine currently stable.  Patient is 
complaining of some cough with yellowish sputum.  Please check a chest x-ray.  
Currently on ceftriaxone.  Antibiotics will furthre be decided pending chest x-
ray results.
H&H currently stable. 
Continue all othre current meds. 
DVT px; Hep sq. Encourage ambulation.
If stable, possible discharge home in am.

## 2018-05-08 NOTE — PN- STUDENT
Subjective
Subjective:
No overnight events reported. Ms. Barros reports having 5.2 L of fluid drained 
from her abdomen yesterday, she includes that she had an episode of vomiting and
an episode of spitting up yellowish phlem during the procedure. She reports that
she also started having a cough last night, that is responding well to cough 
medicine she recieved. She states that she does not have difficulty breathing. 
She denies any abdominal pain today and reports she is felling better and would 
like to go home to her two dogs. 
 
Objective
Objective:
 Current Medications
 
 
  Sig/Reagan Start time  Last
 
Medication Dose Route Stop Time Status Admin
 
Al Hydroxide/Mg  30 ML Q4-6 PRN PRN 05/04 1530 AC 
 
Hydroxide  PO   
 
Albumin Human 50 GM ONCE ONE 05/08 0330 DC 05/08
 
  IV 05/08 0331  0458
 
Benzonatate 100 MG TID PRN 05/07 2045 AC 05/08
 
  PO   0501
 
Ceftriaxone Sodium 1,000 MG DAILY 05/09 0900 UNVr 
 
  IV   
 
Ceftriaxone Sodium 2,000 MG DAILY 05/08 0900 DC 
 
  IV   
 
Ceftriaxone Sodium 1,000 MG DAILY 05/06 1000 DC 05/07
 
  IV   0857
 
Folic Acid 1 MG DAILY 04/29 0900 AC 05/07
 
  PO   0857
 
Heparin Sodium  5,000 UNIT Q8 04/30 2200 AC 05/08
 
(Porcine)  SC   0502
 
Lidocaine 20 ML .STK-MED ONE 05/07 1537 DC 
 
  ID 05/07 1538  
 
Midodrine 5 MG 0800,1200,1600 05/04 1600 AC 05/07
 
  PO   1748
 
Nicotine 14 MG DAILY AS NEEDED PRN 04/29 0730 AC 05/06
 
  TOP   1022
 
Omeprazole 40 MG DAILY AC 05/03 0806 AC 05/08
 
  PO   0502
 
Ondansetron HCl 4 MG .STK-MED ONE 05/07 2159 DC 
 
  IM 05/07 2200  
 
Ondansetron HCl 4 MG Q6P PRN 05/06 1800 AC 05/07
 
  IV   2201
 
Oxycodone HCl 5 MG Q8P PRN 05/06 2115 AC 05/07
 
  PO   2257
 
Pentoxifylline 400 MG TID 05/05 2100 AC 05/07
 
  PO   2034
 
Thiamine HCl 100 MG 1800 04/29 1800 AC 05/07
 
Sodium Chloride 50 ML IV   1749
 
 
 Laboratory Tests
 
 
 05/08 05/07 05/07
 
 0705 1435 1435
 
Chemistry   
 
  Sodium (137 - 145 mmol/L) 137  
 
  Potassium (3.5 - 5.1 mmol/L) 3.8  
 
  Chloride (98 - 107 mmol/L) 95  L  
 
  Carbon Dioxide (22 - 30 mmol/L) 26  
 
  Anion Gap (5 - 16) 15  
 
  BUN (7 - 17 mg/dL) 30  H  
 
  Creatinine (0.5 - 1.0 mg/dL) 0.9  
 
  Estimated GFR (>60 ml/min) > 60  
 
  BUN/Creatinine Ratio (7 - 25 %) 33.3  H  
 
  Total Bilirubin (0.2 - 1.3 mg/dL) 8.0  H  
 
  Direct Bilirubin (< 0.4 mg/dL) 5.3  H  
 
  AST (14 - 36 U/L) 50  H  
 
  ALT (9 - 52 U/L) 30  
 
  Alkaline Phosphatase (<127 U/L) 91  
 
  Total Protein (6.3 - 8.2 g/dL) 6.4  
 
  Albumin (3.5 - 5.0 g/dL) 4.0  
 
Coagulation   
 
  PT (9.4 - 12.5 SEC) 24.0  H  
 
  INR (0.90 - 1.19) 2.18  H  
 
Hematology   
 
  CBC w Diff NO MAN DIFF REQ  
 
  WBC (4.8 - 10.8 /CUMM) 8.8  
 
  RBC (4.20 - 5.40 /CUMM) 2.26  L  
 
  Hgb (12.0 - 16.0 G/DL) 8.0  L  
 
  Hct (37 - 47 %) 23.5  L  
 
  MCV (81.0 - 99.0 FL) 103.8  H  
 
  MCH (27.0 - 31.0 PG) 35.5  H  
 
  MCHC (33.0 - 37.0 G/DL) 34.2  
 
  RDW (11.5 - 14.5 %) 20.0  H  
 
  Plt Count (130 - 400 /CUMM) 106  L  
 
  MPV (7.4 - 10.4 FL) 8.9  
 
  Gran % (42.2 - 75.2 %) 76.6  H  
 
  Lymphocytes % (20.5 - 51.1 %) 11.3  L  
 
  Monocytes % (1.7 - 9.3 %) 11.6  H  
 
  Eosinophils % (0 - 5 %) 0.2  
 
  Basophils % (0.0 - 2.0 %) 0.3  
 
  Absolute Granulocytes (1.4 - 6.5 /CUMM) 6.7  H  
 
  Absolute Lymphocytes (1.2 - 3.4 /CUMM) 1.0  L  
 
  Lymphocytes (%)   18
 
  Absolute Monocytes (0.10 - 0.60 /CUMM) 1.0  H  
 
  Absolute Eosinophils (0.0 - 0.7 /CUMM) 0  
 
  Absolute Basophils (0.0 - 0.2 /CUMM) 0  
 
  % Normal PMNs (%)   2
 
Other Body Source   
 
  Fluid WBC (0 - 5 /CUMM)  415  H 
 
  Fld Mesothelial Cells (%)     
 
  Fld Total RBCs Counted (0 /CUMM)  179  H 
 
  Fluid Glucose (mg/dL)   112
 
  Fluid Total Protein (g/dL)   3.2
 
  Fluid Albumin (g/dL)   1.7
 
  Fluid LDH (U/L)   222
 
  Fluid Amylase (U/L)   127
 
 
 
 
 05/07
 
 UNK
 
Chemistry 
 
  Albumin Cancelled
 
 
 Microbiology
 
 
Date/Time Procedure - Status
 
Source Growth
 
05/07 1435 Body Fluid Culture - RES
 
BODY FLUID 
 
05/07 1435 Gram Stain - RES
 
BODY FLUID 
 
 
 Vital Signs
 
 
Date Time Temp Pulse Resp B/P B/P Pulse O2 O2 Flow FiO2
 
     Mean Ox Delivery Rate 
 
05/08 0621 98.3 96 20 106/60  100 Room Air  
 
05/08 0600 98.3 96 20 106/60     
 
05/08 0200 98.4 88 20 104/60     
 
05/07 2200 98.4 86 20 108/60     
 
05/07 2154 98.4 86 20 108/60  96   
 
05/07 1600      94 Room Air  
 
05/07 1400 98.9 89 20 104/56     
 
05/07 1200 98.5 92 18 120/72     
 
05/07 1000 98.5 92 16 120/72     
 
 
 Intake & Output
 
 
 05/08 1600 05/08 0800 05/08 0000
 
Intake Total  340 250
 
Output Total   
 
Balance  340 250
 
    
 
Intake, IV  100 10
 
Intake, Oral  240 240
 
Number  2 
 
Bowel   
 
Movements   
 
 
Physical Exam
General Apperance: No evidence of acute distress, thin, jaundice, laying in bed,
AOx3
HEENT: scleral icterus present bilaterally
CV: regular rhythm and rate, normal s1 &s2, no MRG
Pulm: inspiratory/expiratory ronchi ausculatated in posterior lung fields 
bilaterally
Abd: bowel sounds present, soft, non-tender, small ecchymosis present in RLQ 
after paracentesis, reduced abdomen size still slight distension
Extremities: extensive ecchymoses present over right upper extremity, jaundice, 
no evidence of peripheral edema
 
Assessment/Plan
Assessment:
Ms. Barros is a 32 year old female with a PMHx significant for hepatorenal 
syndrome, and cirrhosis secondary to ETOH abuse, who presented to the hospital 
for abdominal distension and abdominal pain. She was initially admited to the 
ICU and then stepped down to telemetry unit before coming to the general 
medicine floor. She was hyponateremic when she first arrived, however this has 
improved. She also had hypoalbuminemia, requiring IV replacement of albumin. 
Yesterday she underwent paracentesis for suspected SBP as well as therapeutic 
drainage. She reports felling much better after 5.2 L of bright yellow fluid was
drained from her abdomen by IR, cultures are still pending. She reports 1 
episode of vomiting yellow fluid during the procedure and 1 episode of bringing 
up yellow phlem during the procedure. She has reported a cough that started last
night, and on physical exam she is quite ronchorus. Today she is afebrile and 
otherwise feels well, stating she would like to go home to her two dogs. 
Plan:
Problem list:
1. Fever
2. Hyponatremia
3. Hypoalbuminemia
3. Macrocytic Anemia 
4. Alcoholic cirrhosis
5. Hepatorenal syndrome
6. Cough
7. Chronic Medical conditions
 
#fever: Patient had previous fever that was suspected to be a result of SBP, 
paracentecis was performed yesterday and 5.2 L of bright yellow fluid was 
removed from the abdomen. Cultures are still pending however fluid WBC count was
high at 415, but the PMNs were at 2%. Once cultures come back we can determine 
if this is SBP, a result of microperforation, or not the source of her fever. 
She also reports a cough x1 day that started last night. She has been vomiting 
previously and did vomit x1 during the procedure yesterday. She also 
demonstrated ronchi on ausculatation of her posterior lung fields on exam today 
which may be a result of aspiration pneumonia or pneumonitis. It would be more 
likely that it would be pneumonitis as I did not hear crackles on ausculatation,
and ronchi was not isolated to one paticular lobe indicating more of an 
inflammatory process due to irritation is more likely. However she will have an 
x-ray of the chest done today and if it does indicate consolidation then 
hospital acquired pneumonia should be considered as she has an extended 
hospitalization as she was initially treated in the ICU and tele unit before 
coming to the medical floor.
-Chest X-Ray
-Incentive spirometry
-follow parecentesis fluid culture
#hyponatremia: resolved, likely to have been due to decreased kidney perfusion, 
as labs showed BRETT of pre-renal origin. She also may be suffering from dilated 
cardiomyopathy secondary to alcohol abuse.
-continue to follow electrolytes
-consider echocardiogram and outpatient cardiology f/u
#hypoalbuminemia: patient required IV albumin infusion. This also is likely to 
have contributed to patient's ascites as oncotic pressure was decreased allowing
fluid to escape the vessels. Patient's liver disease is likely contributing to 
decreased albumin state.
-monitor total protein and albumin
-monitor LFTs
#macrocytic anemia: Patient was found to have decreased RBC, Hgb and Hct and 
increased MCV, indicating macrocytic anemia. Patient has history of liver 
disease secondary to ETOH abuse. May be related to vitamin defiency as well as 
liver disease. No reports of blood in stools, hematuria or hemoptysis or any 
other active bleeding.
-MMA and B12 levels
-follow cbc
#alcoholic cirrhosis: Patient has history of alcohol induced cirrhosis of liver.
Patient on exam was found to be enlarged. Patient also has elevated LFTs, 
hypoalbuminemia and clotting studies indicating liver dysfunction. Patient also 
requiring Pentoxifylline and Midorine to increase perfusion as patient's 
cirrhotic liver has lead to portal hypertension and subsequent development of 
hepatorenal syndrome. 
-appreciate GI and nephrology recomendations
-avoid hepatotoxins
-continue pentoxifylline and midorine
-continue IV thiamine 
#Hepatorenal syndrome: patient presented with hyponatremia, and ascites. Patient
has been being followed by GI and Nephrology for this issue over the course of 
her hospitalization. Sodium levels have improved with fluid restriction. Another
contributing factor is likely the loss of fluid volume in the vascular 
compartment into the peritoneal cavity, contributing to a hypovolemic state and 
pre-renal like presentation. After administration of IV albumin and restricting 
fluids the sodium levels have corrected. The addition of pentoxifylline and 
midorine also likely contributed to increasing perfusion to the kidneys. 
-continue pentoxifylline and midorine
-appreciate GI and nephrology recommendations
-avoid hepato/nephrotoxins
-continue to monitor CMP
#cough: patient reports 1 day of cough begining last night. Patient had 
paracentesis done yesterday of abdomen, during this procedure patient vomited 
once yellow fluid. Possible that patient aspirated on vomit leading to 
pneumonitis, as on exam rhonchi was present bilaterally in posterior lung fields
throughout without any localized crackles. If patient had localized crackles 
would be more suspecting of aspiration pneumonia. Furthermore it is also a 
possibility that this is related to the patient's decreased albumin, as the 
oncotic pressure is decreased and may allow fluid to leak in to the pleural 
space contributing to a pleural effusion.
-X-Ray of Chest 
-continue to monitor albumin
-incentive spirometry
#chronic medical conditions:
-continue to observe 
-take all medications as prescribed
 
DVT ppx: Heparin
heart healthy diet
full code

## 2018-05-08 NOTE — RADIOLOGY REPORT
EXAMINATION:
XR CHEST
 
CLINICAL INFORMATION:
Fever. New onset cough
 
COMPARISON:
4/28/2018
 
TECHNIQUE:
2 views of the chest were obtained.
 
FINDINGS:
The lungs are well expanded. There is a patchy opacity at the medial right
base. There is blunting at the left costophrenic angle. No pneumothorax. No
edema. The cardiomediastinal silhouette is within normal limits. No acute
osseous abnormality.
 
IMPRESSION:
Small left pleural effusion.
 
Medial right basilar opacity which could represent atelectasis or pneumonia.

## 2018-05-09 VITALS — SYSTOLIC BLOOD PRESSURE: 116 MMHG | DIASTOLIC BLOOD PRESSURE: 62 MMHG

## 2018-05-09 VITALS — DIASTOLIC BLOOD PRESSURE: 62 MMHG | SYSTOLIC BLOOD PRESSURE: 116 MMHG

## 2018-05-09 VITALS — SYSTOLIC BLOOD PRESSURE: 120 MMHG | DIASTOLIC BLOOD PRESSURE: 60 MMHG

## 2018-05-09 LAB
ABSOLUTE GRANULOCYTE CT: 6.8 /CUMM (ref 1.4–6.5)
BASOPHILS # BLD: 0 /CUMM (ref 0–0.2)
BASOPHILS NFR BLD: 0.4 % (ref 0–2)
EOSINOPHIL # BLD: 0 /CUMM (ref 0–0.7)
EOSINOPHIL NFR BLD: 0.5 % (ref 0–5)
ERYTHROCYTE [DISTWIDTH] IN BLOOD BY AUTOMATED COUNT: 20.9 % (ref 11.5–14.5)
GRANULOCYTES NFR BLD: 73.6 % (ref 42.2–75.2)
HCT VFR BLD CALC: 24 % (ref 37–47)
LYMPHOCYTES # BLD: 1.4 /CUMM (ref 1.2–3.4)
MCH RBC QN AUTO: 35.1 PG (ref 27–31)
MCHC RBC AUTO-ENTMCNC: 33.4 G/DL (ref 33–37)
MCV RBC AUTO: 105 FL (ref 81–99)
MONOCYTES # BLD: 0.9 /CUMM (ref 0.1–0.6)
PLATELET # BLD: 112 /CUMM (ref 130–400)
PMV BLD AUTO: 8.7 FL (ref 7.4–10.4)
PROTHROMBIN TIME: 23.4 SEC (ref 9.4–12.5)
RED BLOOD CELL CT: 2.29 /CUMM (ref 4.2–5.4)
WBC # BLD AUTO: 9.2 /CUMM (ref 4.8–10.8)

## 2018-05-09 NOTE — PN- STUDENT
Subjective
Subjective:
No overnight events reported. Ms. Barros reports broken sleep last night, and 
some bright red blood mixed with her stools this morning. She states that she 
does not know if the blood is from her menses or if it is coming from her 
rectum. Ms. Barros adds that she no longer has a cough and that she has no 
difficulty breathing. She is looking forward toward discharge and feels 
comfortable going home. 
 
Objective
Objective:
 Current Medications
 
 
  Sig/Reagan Start time  Last
 
Medication Dose Route Stop Time Status Admin
 
Al Hydroxide/Mg  30 ML Q4-6 PRN PRN 05/04 1530 AC 
 
Hydroxide  PO   
 
Benzonatate 100 MG TID PRN 05/07 2045 AC 05/09
 
  PO   0354
 
Ceftriaxone Sodium 1,000 MG DAILY 05/08 0915 AC 05/08
 
  IV   0938
 
Ceftriaxone Sodium 2,000 MG DAILY 05/08 0900 DC 
 
  IV   
 
Folic Acid 1 MG DAILY 04/29 0900 AC 05/08
 
  PO   0938
 
Heparin Sodium  5,000 UNIT Q8 04/30 2200 AC 05/08
 
(Porcine)  SC   2041
 
Midodrine 5 MG 0800,1200,1600 05/04 1600 AC 05/08
 
  PO   1608
 
Nicotine 14 MG DAILY AS NEEDED PRN 04/29 0730 AC 05/06
 
  TOP   1022
 
Omeprazole 40 MG DAILY AC 05/03 0806 AC 05/08
 
  PO   0502
 
Ondansetron HCl 4 MG .STK-MED ONE 05/08 2200 DC 
 
  IM 05/08 2201  
 
Ondansetron HCl 4 MG Q6P PRN 05/06 1800 AC 05/08
 
  IV   2203
 
Oxycodone HCl 5 MG Q8P PRN 05/06 2115 AC 05/08
 
  PO   2153
 
Pentoxifylline 400 MG TID 05/05 2100 AC 05/08
 
  PO   2041
 
Thiamine HCl 100 MG DAILY 05/08 1048 AC 05/08
 
  PO   1216
 
Thiamine HCl 100 MG 1800 04/29 1800 DC 05/07
 
Sodium Chloride 50 ML IV   1749
 
 
 Laboratory Tests
 
 
 05/09
 
 0635
 
Chemistry 
 
  Total Bilirubin Pending
 
  Direct Bilirubin Pending
 
  AST Pending
 
  ALT Pending
 
  Alkaline Phosphatase Pending
 
  Total Protein Pending
 
  Albumin Pending
 
Coagulation 
 
  PT Pending
 
  INR Pending
 
Hematology 
 
  CBC w Diff Pending
 
  WBC Pending
 
  RBC Pending
 
  Hgb Pending
 
  Hct Pending
 
  MCV Pending
 
  MCH Pending
 
  MCHC Pending
 
  RDW Pending
 
  Plt Count Pending
 
  MPV Pending
 
 
 Vital Signs
 
 
Date Time Temp Pulse Resp B/P B/P Pulse O2 O2 Flow FiO2
 
     Mean Ox Delivery Rate 
 
05/09 0603 99.0 97 20 116/62  96 Room Air  
 
05/09 0000 99.0 105 20 120/60     
 
05/09 0000      94 Room Air  
 
05/08 2155 99.0 105 20 120/60  94   
 
05/08 1948 98.9 97 18 100/60  100 Room Air  
 
05/08 1354 98.7 87 20 100/70  97 Room Air  
 
05/08 1200 98.5 91 20 106/60     
 
05/08 0800 98.3 96 20 106/60     
 
 
 Intake & Output
 
 
 05/09 0800 05/09 0000 05/08 1600
 
Intake Total  300 
 
Output Total   
 
Balance  300 
 
    
 
Intake, IV  20 
 
Intake, Oral  280 
 
Number 0  
 
Bowel   
 
Movements   
 
 
5/9/18 Chest X-Ray
Impression: small left pleural effusion, medial right basilar opacity which 
could represent atalectasis or pneumonia
 
Physical Exam
General apperance: Jaundice, thin, laying flat in bed, no evidence of acute 
distress, AOx3
CV: tachycardic (108), regular rhythm, normal S1&S2, no MRG
Pulmonary: lungs clear to ausculatation in all posterior lung fields
Abd: slight abdominal distension, bowel sounds present, small ecchymosis in RLQ 
at site of paracentesis, stool- soft, brown, with bright red blood mixed in     
                                                                                
                                                                                
                                                                                
                                                                                
                                                                                
                                                                                
                                                                                
                                                                                
                                                                                
                                                                                
                                                                                
                                                                                
                                                                                
                                                                        
Extremities: extensive ecchymosis over ther right upper extremity however 
improving, jaundice of skin, no evidence of peripheral edema 
 
 
Assessment/Plan
Assessment:
Ms. Barros is a 32 year old female with a PMHx significant for hepatorenal 
syndrome, and cirrhosis secondary to ETOH abuse, who presented to the hospital 
with abdominal distension and pain. She was admited to the ICU and stepped down 
to telemetry before arriving to the general medicine floor. Over the course of 
her hospitalization her hyponatremia and hypoalbuminemia have improved. Her 
abdominal distension was also corrected after paracentesis in which 5.2 L of 
bright yellow fluid was drained from her abdomen. However during this procedure 
she had 1 episode of vomiting up yellow fluid and subsequently developed 1 day 
of cough with yellow phlem production. She does not report a cough today, and 
her only concern is bright red blood mixed in her stools this morning. However 
the patient reports that she is currently mensturating. 
 
Plan:
Problem list:
1. Fever
2. Hyponatremia
3. Hypoalbuminemia
3. Macrocytic Anemia 
6. Cough
7. Bright red blood mixed with stool
8. Chronic Medical conditions
 
#fever: Patient had previous fever, however has been afebrile over the last 2 
days. Initally it was suspected that this was the result of SBP, however 
cultures s/p paracentesis revealed no organism growth after 24 hrs. Patient has 
also reported a cough x1 day and episodes of vomiting over her hospital course. 
Chest xray was obtained and revealed pleural effusion of the left lung, and 
possible pneumonia in the right lung. This may be what was the source of the 
fever. She had been recieving abx therapy in the hospital and does not report 
any respiratory difficulty or continued cough.
-discharge today [facility] oral abx therapy course
-return to hospital if fever returns 
-return to hospital if cough/difficulty breathing returns/gets worse
 
#hyponatremia: Patient's hyponatremia found on admission to the hospital has 
resolved. Likely this was related to patient's decreased renal pefusion as labs 
indicated prerenal BRETT. Ms. Barros's history of alcohol abuse resulting in 
cirrhosis and esophageal varcies also raises susipicion for dilated 
cardiomyopathy from alcohol abuse, which would also decrease perfusion to the 
kidneys. She was treated with pentoxifyllin and midorine to increase perfusion 
which has been well tolerated. 
-discharge with pentoxifyllin and midorine
-f/u with cardiology in outpatient setting for echocardiogram and eval for 
possible dilated cardiomyopathy
-f/u with GI 
 
#hypoalbuminemia: On admission patient was also discovered to have decreased 
albumin. This is likely responsible for patient's ascites as decreased albumin 
in the vasculature decreases oncotic pressure allowing fluid to seep out of 
vasculature. Ms. Barros recieved IV albumin in hospital and will likely need 
close monitoring of albumin levels in outpatient setting with GI. It is possible
she may need theraputic IV albumin admistration periodically due to the 
extensive liver damage sustained from alcohol abuse. 
-f/u with GI
-avoid hepatotoxins
-continue pentoxifylline and midorine
 
#macrocytic anemia: Patient continues to demonstrate decreased RBC, HCT and Hgb 
with increased MCV. However Patient is currently mensturating and is likely to 
have some affect on these values. It is also possible the patient has vitamin 
deficiency specifically B12. Another consideration is the patient's long 
standing liver disease which can demonstrate macrocytic anemia on lab workup as 
well. Patient is to be discharged today and this should be followed by PCP.
-f/u with PCP after completion of menses
-consider B12 and MMA testing for vit def.
 
#cough: Patient reported 1 day of cough, after paracentesis procedure which 
elicited one episode of vomiting. It is likely patient aspirated while vomiting 
and contributed to pneumonia which was seen on chest xray. This is likely to 
have contribute to the patient's fever as well. Patient also found to have 
pleural effusion on imaging, which may also be related to the decrease oncotic 
pressure from reduced albumin in the blood. 
-cont. oral abx therapy in outpatient setting
-return if worsens or continues
 
#bright red blood mixed with stool: Patient reports she had a BM this morning 
with bright red blood mixed with stool. She also reports currently having her 
menses. She denies history of hemorroids, however has a history of esophageal 
varcies which is a similar pathologic processes in someone with portal 
hypertension secondary to liver disease. If this was related to bleeding from 
esophageal varcies, her stool would appear dark/coffe ground like apperance as 
it would be indicative of upper GI bleed. However bright red blood would 
indicate lower GI, or result of menses which is most probable. She will f/u with
GI in outpatient setting and if this continues after menses may require further 
investigation via sigmoidscopy or colonscopy. 
-f/u with GI
-return if continues or worsens
 
#chronic medical conditions
-f/u with PCP
-continue all medications as directed
 
DVT ppx Heparin
Heart healthy diet
full code

## 2018-05-09 NOTE — DISCHARGE SUMMARY
Visit Information
 
Visit Dates
Admission Date:
04/28/18
 
Discharge Date:
05/09/18
 
 
Hospital Course
 
Course
Attending Physician:
Cain CORNEJO,Leah
 
Primary Care Physician:
Dr Bear
 
Hospital Course:
Ms Barros is a 32-year-old female with a PMHx of alcohol abuse was sent into the
hospital by her Gastroenterologist when she was found to have Sodium level of 
117.  She was admitted to the ICU and downgraded to general medicine once 
stable.  
 
Patient was treated for the following medical problems:
 
#Hyponatremia
Patient was initially on a 500 ML fluid restriction.  Daily BEP is showed steady
improvement of hyponatremia.  Fluid restriction was eventually increased to 1000
mL per day, and then stopped altogether. 
 
 
#Hepatorenal syndrome
Patient is being followed by both GI and nephrology.  Patient was on IV albumin 
infusions 25 mg 3 times a day when transferred.  This was slowly tapered down 
initially by cutting the dose in half to 12.5 mg 3 times a day and then 
decreasing to twice a day and then stopped completely.  Patient has been 
continued on octreotide 100 mcg 3 times a day.  Patient was on metered 10 mg 
twice a day, this was decreased to 5 mg twice a day on 5/4 an order to determine
the continued need based on BP.  While patient has had times of borderline blood
pressure, it does not appear as though she is dependent on midodrine to maintain
BP.  She was started on pentoxifylline 400 mg by mouth 3 times a day on for the 
duration of 28 days, started on 5/5/18.  Renal function has slowly been 
improving.  We discontinued octreotide and monitored renal function per 
nephrology.
 
 
#Decompensated alcoholic liver cirrhosis
EGD on 5/2/18 revealed stage I esophageal varices.  Patient is not a candidate 
for beta blocker at this time given hepatorenal syndrome.  Patient has 
significant ascites, SBP was initially ruled out during ICU stay.  Patient 
spiked a fever of 101.2 on the evening of 5/5/18.  She was empirically started 
on IV ceftriaxone. Her PMNs did not meet the criteria for SBP.
 
 
#Acute blood loss anemia secondary to grade 1 esophageal varices and duodenitis
Patient's H/H has remained stable since being transferred out of the ICU.  
Patient received 2 units PRBCs on 4/28/18.
 
 
#Thrombocytopenia
Her low platelets were suspected to be due to her liver cirrhosis. We monitored 
her platelets
 
 
#Suspected Aspiration PNA
During her hospital she had an episode of emesis and was suspected to have 
aspirated. CXR demonstrated for right basilar opacity suspicious pneumonia. She 
was covered with antibiotics.
Allergies:
Coded Allergies:
No Known Allergies (03/15/18)
 
Significant Procedures:
05/07/ US-PARACENTESIS
PROCEDURE NOTE:
5.2 of clear bright yellow fluid was aspirated before drainage ceased. The
catheter was removed and sterile dressing applied.
 
The patient tolerated the procedure well without evidence of complications.
 
FINDINGS:
Large simple abdominal ascites as detailed above.
 
IMPRESSION:
Successful ultrasound-guided therapeutic and diagnostic paracentesis.
 
PLAN:
The patient was stable after the procedure. The patient will be transferred
to the floor.
 
 
Pertinent Lab Results:
04/28/18 XRY-PORTABLE CHEST XRAY
FINDINGS:
No significant abnormality is noted involving the heart, lungs, mediastinum,
bony thorax or soft tissues.
 
IMPRESSION:
Unremarkable examination.
 
 
04/28/ US-LIMITED ABDOMEN
IMPRESSION:
Liver of coarse and echogenicity without focal lesions. There is subcapsular
nodularity. The appearance is nonspecific, but could be representative of
cirrhosis.
 
No hepatic mass lesions are demonstrable.
 
There is echogenic bile without cholelithiasis. There is gallbladder wall
thickening, likely accentuated due to adjacent ascites.
 
Moderate amount of ascites.
 
 
04/29/ US - US-RENAL/KIDNEY
IMPRESSION:
No acute sonographic findings in either kidney compared to 03/15/2018. The
kidneys have normal echotexture. No evidence of nephrolithiasis or
hydronephrosis.
 
Moderate ascites is present.
 
 
04/29/ US-DUPLEX VENOUS EXTREM UNI
FINDINGS:
The grayscale and pulsed color Doppler images show normal, widely patent
appearance of the right internal jugular, innominate, subclavian, axial,
brachial, basilic and cephalic veins. No evidence of superficial or deep vein
thrombosis. No focal fluid collection is identified.
 
IMPRESSION:
Normal triplex scan; no evidence of deep venous thrombosis in the right upper
extremity.
 
Disposition Summary
 
Disposition
Principal Diagnosis:
Acute blood loss anemia secondary to grade 1 esophageal varices and duodenitis
Decompensated alcoholic cirrhosis 
Hepatorenal syndrome
Thrombocytopenia
Hyponatremia
Suspected Aspiration PNA
Additional Diagnosis:
as above
Discharge Disposition: home health services
 
Discharge Instructions
 
General Discharge Information
Code Status: Full Code
Patient's Diet:
Sodium restricted diet
Patient's Activity:
As tolerated
Follow-Up Instructions/Appts:
Follow-up with Dr. Walters within 1 week of discharge, we have provided you 
with a referral.
 
Follow-up with Dr. Roger within 1 week of discharge, we provided you with a 
referral.
 
Follow-up with her primary care physician within one week.
 
Take all medication as directed. Discuss with your Providers in regards to 
restarting your Spironolactone and Furosemide
 
Call your doctor or return to the ER if you should experience abdominal pain, 
lightheadedness, dizziness, fever, shaking chills, nausea, vomiting, bloody 
stool
 
Medications at Discharge
Discharge Medications:
Stop taking the following medications:
Furosemide (Furosemide) 20 MG TABLET ORAL DAILY Qty = 60
 
Spironolactone (Spironolactone) 50 MG TABLET ORAL DAILY Qty = 60
 
Continue taking these medications:
Riboflavin (Vitamin B-2) 25 MG TABLET
    1 Tablet ORAL DAILY
    Comments:
       NOT TAKEN IN HOSPITAL
 
Lactulose (Lactulose) 10 GRAM/15 ML SOLUTION
    15 Milliliters ORAL DAILY as needed for CONSTIPATION
    Qty = 473
    Comments:
       NOT TAKEN IN HOSPITAL
 
Calcium Carb/Magnesium Hydrox (Rolaids Chewable Tablet) (Unknown Strength) 
TAB.CHEW
    Unknown Dose ORAL DAILY
    Comments:
       NOT TAKEN IN HOSPITAL
 
Start taking the following new medications:
Folic Acid (Folic Acid) 1 MG TABLET
    1 Milligram ORAL DAILY
    Qty = 30
    No Refills
    Instructions:
       .
    Comments:
       LAST TAKEN:  5/9/18 @ 9 AM
 
Thiamine HCl (Vitamin B-1) 100 MG TABLET
    100 Milligram ORAL DAILY
    Qty = 30
    No Refills
    Instructions:
       .
    Comments:
       LAST TAKEN:  5/9/18 @ 9 AM
 
Pentoxifylline (Pentoxifylline) 400 MG TABLET.ER
    400 Milligram ORAL THREE TIMES DAILY
    Qty = 75
    No Refills
    Instructions:
       .
    Comments:
       NOT TAKEN IN HOSPITAL
 
Nicotine (Nicotine Patch) 14 MG/24 HOUR PATCH.TD24
    14 Milligram On the skin DAILY AS NEEDED as needed for tobacco use
    Qty = 14
    No Refills
    Instructions:
       .
    Comments:
       NOT WORN IN HOSPITAL
 
Midodrine HCl (Midodrine HCl) 2.5 MG TABLET
    5 Milligram ORAL 0800,1200,1600
    Qty = 75
    No Refills
    Instructions:
       .
    Comments:
       LAST TAKEN:  5/9/18 @ 2 PM 
 
Omeprazole (Omeprazole) 20 MG CAPSULE.DR
    40 Milligram ORAL DAILY BEFORE BREAKFAST
    Qty = 14
    No Refills
    Comments:
       LAST TAKEN:  5/9/18 7 AM
 
Amoxicillin/Potassium Clav (Augmentin 875-125 Tablet) 875 MG-125 MG TABLET
    1 Tablet ORAL TWICE DAILY
    Qty = 2
    No Refills
    Comments:
       NOT TAKEN IN HOSPITAL
 
 
Copies To:
Kana CORNEJO,Adis ARRIOLA; Romeo CORNEJO,Telma

## 2018-05-09 NOTE — PN- HOUSESTAFF
Jeanine Qureshi 18 0739:
Subjective
Follow-up For:
#Fever
#Hyponatremia
#Acute blood loss anemia secondary to grade 1 esophageal varices and duodenitis
#Decompensated alcoholic cirrhosis s/p 5L paracentesis PD 2
#Hepatorenal syndrome
#Thrombocytopenia
#?Aspiration PNA
Subjective:
No acute events or complaints overnight
 
Review of Systems
Constitutional:
Reports: see HPI. 
 
Objective
Last 24 Hrs of Vital Signs/I&O
 Vital Signs
 
 
Date Time Temp Pulse Resp B/P B/P Pulse O2 O2 Flow FiO2
 
     Mean Ox Delivery Rate 
 
 0603 99.0 97 20 116/62  96 Room Air  
 
05/ 0000 99.0 105 20 120/60     
 
05/09 0000      94 Room Air  
 
/08 2155 99.0 105 20 120/60  94   
 
/ 1948 98.9 97 18 100/60  100 Room Air  
 
05/08 1354 98.7 87 20 100/70  97 Room Air  
 
/08 1200 98.5 91 20 106/60     
 
/08 0800 98.3 96 20 106/60     
 
 
 Intake & Output
 
 
  0800 / 0000  1600
 
Intake Total  300 
 
Output Total   
 
Balance  300 
 
    
 
Intake, IV  20 
 
Intake, Oral  280 
 
Number 0  
 
Bowel   
 
Movements   
 
 
 
 
Physical Exam
General Appearance: Alert, Oriented X3, Cooperative, No Acute Distress
Skin: mild jaundice
HEENT: scleral icterus
Cardiovascular: Regular Rate, Normal S1, Normal S2, No Murmurs
Lungs: Clear to Auscultation, Normal Air Movement
Abdomen: Normal Bowel Sounds, Soft, No Tenderness
Extremities: No Edema
Current Medications:
 Current Medications
 
 
  Sig/Reagan Start time  Last
 
Medication Dose Route Stop Time Status Admin
 
Al Hydroxide/Mg  30 ML Q4-6 PRN PRN  1530 AC 
 
Hydroxide  PO   
 
Benzonatate 100 MG TID PRN  2045 AC 
 
  PO   0354
 
Ceftriaxone Sodium 1,000 MG DAILY 0915 AC 
 
  IV   0938
 
Ceftriaxone Sodium 2,000 MG DAILY 900 DC 
 
  IV   
 
Folic Acid 1 MG DAILY  09 AC 
 
  PO   0938
 
Heparin Sodium  5,000 UNIT Q8  2200 AC 
 
(Porcine)  SC   2041
 
Midodrine 5 MG 0800,1200,1600  1600 AC 
 
  PO   1608
 
Nicotine 14 MG DAILY AS NEEDED PRN  0730 AC 
 
  TOP   1022
 
Omeprazole 40 MG DAILY AC  0806 AC 
 
  PO   0502
 
Ondansetron HCl 4 MG .STK-MED ONE 2200 DC 
 
  IM 2201  
 
Ondansetron HCl 4 MG Q6P PRN  1800 AC 
 
  IV   2203
 
Oxycodone HCl 5 MG Q8P PRN  2115 AC 
 
  PO   2153
 
Pentoxifylline 400 MG TID  2100 AC 
 
  PO   204
 
Thiamine HCl 100 MG DAILY  1048 AC 
 
  PO   1216
 
Thiamine HCl 100 MG 1800  1800 DC 
 
Sodium Chloride 50 ML IV   1749
 
 
 
 
Last 24 Hrs of Lab/Raghav Results
Last 24 Hrs of Labs/Mics:
 Laboratory Tests
 
18 0635:
Total Bilirubin Pending, Direct Bilirubin Pending, AST Pending, ALT Pending, 
Alkaline Phosphatase Pending, Total Protein Pending, Albumin Pending, PT Pending
, INR Pending, CBC w Diff Pending, WBC Pending, RBC Pending, Hgb Pending, Hct 
Pending, MCV Pending, MCH Pending, MCHC Pending, RDW Pending, Plt Count Pending,
MPV Pending
 
 
Assessment/Plan
Assessment:
Ms. Barros is a 32-year-old female with past medical history significant for 
alcohol use, cirrhosis who was admitted with acute hyponatremia, decompensated 
liver cirrhosis with acute renal failure likely secondary to hepatorenal 
syndrome, acute blood loss anemia.
 
Problem list:
#Fever
#Hyponatremia
#Acute blood loss anemia secondary to grade 1 esophageal varices and duodenitis
#Decompensated alcoholic cirrhosis s/p 5L paracentesis PD 2
#Hepatorenal syndrome
#Thrombocytopenia
#?Aspiration PNA
 
Plan:
Monitor low platelets
Continue IV Ceftriaxone
Augmentin x 1 dose upon discharge for ?Aspiration PNA
Continue Midodrine
IV Albumin 25 gm x 1 dose given
Continue pentoxifylline 400 mg PO TID for a total of 28 days as per GI
We will monitor renal function
Lactulose PRN for constipation upon discharge
Appreciate GI recommendations
 
Diet: Na restriction
DVT ppx: sc Heparin
Code: FULL
 
Dispo: Home with GI follow up
 
Problem List:
 1. Hepatorenal syndrome
 
 2. Hyponatremia
 
 3. Alcoholic hepatitis
 
 4. Ascites due to alcoholic cirrhosis
 
Pain Ratin
Pain Location:
NA
Pain Goal: Remain pain free
Pain Plan:
NA
Tomorrow's Labs & Rationales:
none
 
 
Cain CORNEJOLeah 18 1107:
Attending MD Review Statement
 
Attending Statement
Attending MD Statement: examined this patient, discuss w/resident/PA/NP, agreed 
w/resident/PA/NP, reviewed EMR data (avail), discussed with nursing, discussed 
with case mgmt, reviewed images, amended to note
Attending Assessment/Plan:
Patient seen and examined, she denies any complaints but the she was just 
feeling tired.  She said that she would off-and-on sleep last night.  She is 
excited about going home today.
 
Vital Signs
 
 
Date Time Temp Pulse Resp B/P B/P Pulse O2 O2 Flow FiO2
 
     Mean Ox Delivery Rate 
 
 0800 99.0 97 20 116/62     
 
/09 0603 99.0 97 20 116/62  96 Room Air  
 
 0000 99.0 105 20 120/60     
 
/ 0000      94 Room Air  
 
/ 2155 99.0 105 20 120/60  94   
 
/08 1948 98.9 97 18 100/60  100 Room Air  
 
/08 1354 98.7 87 20 100/70  97 Room Air  
 
/08 1200 98.5 91 20 106/60     
 
 
on exam; 
aox3, nad. 
cv; s1, s2,rrr
resp; clear.
abd; soft, nt, bs+
ext; no edema
 
 Laboratory Tests
 
 
 
 
 0635
 
Chemistry 
 
  Total Bilirubin (0.2 - 1.3 mg/dL) 8.3  H
 
  Direct Bilirubin (< 0.4 mg/dL) 5.7  H
 
  AST (14 - 36 U/L) 68  H
 
  ALT (9 - 52 U/L) 33
 
  Alkaline Phosphatase (<127 U/L) 106
 
  Total Protein (6.3 - 8.2 g/dL) 5.3  L
 
  Albumin (3.5 - 5.0 g/dL) 3.4  L
 
Coagulation 
 
  PT (9.4 - 12.5 SEC) 23.4  H
 
  INR (0.90 - 1.19) 2.13  H
 
Hematology 
 
  CBC w Diff NO MAN DIFF REQ
 
  WBC (4.8 - 10.8 /CUMM) 9.2
 
  RBC (4.20 - 5.40 /CUMM) 2.29  L
 
  Hgb (12.0 - 16.0 G/DL) 8.0  L
 
  Hct (37 - 47 %) 24.0  L
 
  MCV (81.0 - 99.0 FL) 105.0  H
 
  MCH (27.0 - 31.0 PG) 35.1  H
 
  MCHC (33.0 - 37.0 G/DL) 33.4
 
  RDW (11.5 - 14.5 %) 20.9  H
 
  Plt Count (130 - 400 /CUMM) 112  L
 
  MPV (7.4 - 10.4 FL) 8.7
 
  Gran % (42.2 - 75.2 %) 73.6
 
  Lymphocytes % (20.5 - 51.1 %) 15.2  L
 
  Monocytes % (1.7 - 9.3 %) 10.3  H
 
  Eosinophils % (0 - 5 %) 0.5
 
  Basophils % (0.0 - 2.0 %) 0.4
 
  Absolute Granulocytes (1.4 - 6.5 /CUMM) 6.8  H
 
  Absolute Lymphocytes (1.2 - 3.4 /CUMM) 1.4
 
  Absolute Monocytes (0.10 - 0.60 /CUMM) 0.9  H
 
  Absolute Eosinophils (0.0 - 0.7 /CUMM) 0
 
  Absolute Basophils (0.0 - 0.2 /CUMM) 0
 
 
A/P;  32-year-old female with past medical history significant for alcohol use, 
cirrhosis who was admitted with acute hyponatremia, decompensated liver 
cirrhosis with acute renal failure likely secondary to hepatorenal syndrome, 
acute blood loss anemia.
 
H&H stable. 
patient nely feels ok, but looks tired. Will check ammonia level. 
If that is ok, she can likely be discharged home today. 
Also she will be given Rx for abx for one more day for possible aspiration pna. 
She should follow up with GI/ PCP as outpatient.

## 2018-05-23 ENCOUNTER — HOSPITAL ENCOUNTER (INPATIENT)
Dept: HOSPITAL 68 - ERH | Age: 33
LOS: 9 days | Discharge: HOME HEALTH | DRG: 280 | End: 2018-06-01
Attending: INTERNAL MEDICINE | Admitting: INTERNAL MEDICINE
Payer: COMMERCIAL

## 2018-05-23 VITALS — WEIGHT: 110.13 LBS | BODY MASS INDEX: 17.28 KG/M2 | HEIGHT: 67 IN

## 2018-05-23 DIAGNOSIS — F10.20: ICD-10-CM

## 2018-05-23 DIAGNOSIS — K76.6: ICD-10-CM

## 2018-05-23 DIAGNOSIS — E87.1: ICD-10-CM

## 2018-05-23 DIAGNOSIS — K70.31: Primary | ICD-10-CM

## 2018-05-23 DIAGNOSIS — E83.42: ICD-10-CM

## 2018-05-23 DIAGNOSIS — K29.80: ICD-10-CM

## 2018-05-23 DIAGNOSIS — I85.10: ICD-10-CM

## 2018-05-23 DIAGNOSIS — I86.4: ICD-10-CM

## 2018-05-23 DIAGNOSIS — K72.90: ICD-10-CM

## 2018-05-23 DIAGNOSIS — K31.89: ICD-10-CM

## 2018-05-23 DIAGNOSIS — E44.1: ICD-10-CM

## 2018-05-23 DIAGNOSIS — D53.9: ICD-10-CM

## 2018-05-23 DIAGNOSIS — D68.9: ICD-10-CM

## 2018-05-23 DIAGNOSIS — Z88.6: ICD-10-CM

## 2018-05-23 DIAGNOSIS — K76.7: ICD-10-CM

## 2018-05-23 DIAGNOSIS — Z87.891: ICD-10-CM

## 2018-05-23 LAB
ABSOLUTE GRANULOCYTE CT: 9.4 /CUMM (ref 1.4–6.5)
APTT BLD: 34 SEC (ref 25–37)
BASOPHILS # BLD: 0 /CUMM (ref 0–0.2)
BASOPHILS NFR BLD: 0.4 % (ref 0–2)
EOSINOPHIL # BLD: 0.1 /CUMM (ref 0–0.7)
EOSINOPHIL NFR BLD: 0.9 % (ref 0–5)
ERYTHROCYTE [DISTWIDTH] IN BLOOD BY AUTOMATED COUNT: 19 % (ref 11.5–14.5)
GRANULOCYTES NFR BLD: 73.9 % (ref 42.2–75.2)
HCT VFR BLD CALC: 20.4 % (ref 37–47)
LYMPHOCYTES # BLD: 1.6 /CUMM (ref 1.2–3.4)
MCH RBC QN AUTO: 35 PG (ref 27–31)
MCHC RBC AUTO-ENTMCNC: 34.7 G/DL (ref 33–37)
MCV RBC AUTO: 101 FL (ref 81–99)
MONOCYTES # BLD: 1.6 /CUMM (ref 0.1–0.6)
PLATELET # BLD: 165 /CUMM (ref 130–400)
PMV BLD AUTO: 8.3 FL (ref 7.4–10.4)
PROTHROMBIN TIME: 18.1 SEC (ref 9.4–12.5)
RED BLOOD CELL CT: 2.02 /CUMM (ref 4.2–5.4)
WBC # BLD AUTO: 12.7 /CUMM (ref 4.8–10.8)

## 2018-05-23 PROCEDURE — 04007: CPT

## 2018-05-23 PROCEDURE — P9047 ALBUMIN (HUMAN), 25%, 50ML: HCPCS

## 2018-05-23 PROCEDURE — P9016 RBC LEUKOCYTES REDUCED: HCPCS

## 2018-05-23 PROCEDURE — 87075 CULTR BACTERIA EXCEPT BLOOD: CPT

## 2018-05-23 PROCEDURE — 84300 ASSAY OF URINE SODIUM: CPT

## 2018-05-23 PROCEDURE — 84133 ASSAY OF URINE POTASSIUM: CPT

## 2018-05-23 NOTE — ED GI/GU/ABDOMINAL COMPLAINT
History of Present Illness
 
General
Chief Complaint: Abdominal Pain/Flank Pain
Stated Complaint: "IM IN ALOT OF PAIN" LWR BACK, ABD PAIN, MED REFIL
Source: patient, family, old records
Exam Limitations: no limitations
 
Vital Signs & Intake/Output
Vital Signs & Intake/Output
 Vital Signs
 
 
Date Time Temp Pulse Resp B/P B/P Pulse O2 O2 Flow FiO2
 
     Mean Ox Delivery Rate 
 
05/24 0204 98.1 104 18 102/48  99   
 
05/24 0123 98.3 98 18 104/58  97 Room Air  
 
05/23 2044 98.0 123 18 127/79  98 Room Air  
 
 
 ED Intake and Output
 
 
 05/24 0000 05/23 1200
 
Intake Total 0 
 
Output Total  
 
Balance 0 
 
   
 
Intake, Oral 0 
 
Patient 120 lb 
 
Weight  
 
Weight Reported by Patient 
 
Measurement  
 
Method  
 
 
 
Allergies
Coded Allergies:
acetaminophen (From TYLENOL) (HIVES 05/23/18)
ibuprofen (From MOTRIN) (HIVES 05/23/18)
 
Reconcile Medications
Calcium Carb/Magnesium Hydrox (Rolaids Chewable Tablet) (Unknown Strength) 
TAB.CHEW   (Unknown Dose) PO DAILY SUPPLEMENT  (Reported)
Folic Acid 1 MG TABLET   1 MG PO DAILY vitamin deficiency
     .
Lactulose 10 GRAM/15 ML SOLUTION   15 ML PO DAILY PRN CONSTIPATION  (Reported)
Midodrine HCl 2.5 MG TABLET   5 MG PO 0800,1200,1600 Liver disease
     .
Nicotine (Nicotine Patch) 14 MG/24 HOUR PATCH.TD24   14 MG TOP DAILY AS NEEDED 
PRN tobacco use
     .
Omeprazole 20 MG CAPSULE.DR   40 MG PO DAILY AC Acid reflux
Pentoxifylline 400 MG TABLET.ER   400 MG PO TID Liver disease
     .
Riboflavin (Vitamin B-2) 25 MG TABLET   1 TAB PO DAILY SUPPLEMENT  (Reported)
Thiamine HCl (Vitamin B-1) 100 MG TABLET   100 MG PO DAILY vitamin deficiency
     .
 
Triage Note:
PT FROM HOME C/O NUMEROUS COMPLAINTS. PT STATES
 "1. I NEED A MEDICATION REFILL, I RAN OUT OF MY
 OXYCODONE AND I AM IN 7/10 PAIN AND INCREASING, 2.
 MY ABD AND BACK ARE IN PAIN, 3. I THINK MY SODIUM
 LEVEL IS OFF BECAUSE I FEEL AWFUL AND I THINK I
 NEED A BLOOD TRANSFUSION" PT IS TRIAGE A&0X3,
 AMBULATORY WITH STEADY GAIT INDEPENDENTLY. VSS. PT
 PT STATES THAT THE MD'S PROVIDED HER WITH F/U AND
 "THE DOCTORS NEVER CALLED ME SO THE ER IS THE
 CLOSEST"
Triage Nurses Notes Reviewed? yes
LMP (ages 10-50): unknown
Pregnant? n
Is pt currently breastfeeding? No
Onset: Last week
Duration: day(s):, constant, continues in ED
Timing: recent history
Quality/Severity: aching, severe
Location: generalized abdomen
Radiation: back
Activities at Onset: none
Prior Abdominal Problems: similar symptoms
Past Sexual History: Unobtainable at this time
Modifying Factors:
Worsens With: palpation. 
Associated Symptoms: abdominal pain, loss of appetite, nausea/vomiting
HPI:
1 week prior to admission patient complains of continued generalized abdominal 
and low back pain throbbing fullness with anorexia nausea.
She denies fever chills vomiting diarrhea chest pain cough shortness of breath 
headache dysuria rash bleeding.
 
Past History
 
Travel History
Traveled to [place] past 21 day No
 
Medical History
Any Pertinent Medical History? see below for history
Neurological: NONE
EENT: NONE
Cardiovascular: NONE
Respiratory: NONE
Gastrointestinal: NONE
Hepatic: ?LIVER FAILURE
Renal: NONE
Musculoskeletal: NONE
Psychiatric: alcohol dependence
Endocrine: NONE
Blood Disorders: NONE
Cancer(s): NONE
GYN/Reproductive: NONE
History of MRSA: No
History of VRE: No
History of CDIFF: No
 
Surgical History
Surgical History: non-contributory
 
Psychosocial History
Who do you live with Significant Other
Services at Home None
What is your primary language English
Tobacco Use: Quit >30 days ago
 
Family History
Family History, If Any:
FATHER (CAD).
 
Hx Contributory? No
 
Review of Systems
 
Review of Systems
Constitutional:
Reports: see HPI, weakness. 
EENTM:
Reports: no symptoms. 
Respiratory:
Reports: no symptoms. 
Cardiovascular:
Reports: no symptoms. 
GI:
Reports: see HPI, abdominal pain, distention, nausea. 
Genitourinary:
Reports: no symptoms. 
Musculoskeletal:
Reports: no symptoms. 
Skin:
Reports: see HPI, rash. 
Neurological/Psychological:
Reports: no symptoms. 
Hematologic/Endocrine:
Reports: no symptoms. 
Immunologic/Allergic:
Reports: no symptoms. 
All Other Systems: Reviewed and Negative
 
Physical Exam
 
Physical Exam
General Appearance: well developed/nourished, alert, awake, anxious, moderate 
distress
Head: atraumatic
Eyes:
Bilateral: PERRL, EOMI, other (icteris). 
Ears, Nose, Throat, Mouth: hearing grossly normal, dry mucous membranes
Neck: normal inspection, supple, full range of motion, normal alignment
Respiratory: normal breath sounds, chest non-tender, no respiratory distress, 
quiet respiration, lungs clear
Cardiovascular: regular rate/rhythm, normal peripheral pulses, norml femoral 
pulses equa
Peripheral Pulses:
4+ carotid (R), 4+ carotid (L)
Gastrointestinal: soft, distention, hepatomegaly, caput medusae
Back: normal inspection, normal range of motion
Extremities: normal range of motion, no ligament instability
Neurologic/Psych: no motor/sensory deficits, awake, alert, oriented x 3, normal 
mood/affect, CNs II-XII nml as tested
Skin: jaundice, pallor
 
Core Measures
ACS in differential dx? No
Sepsis Present: No
Sepsis Focused Exam Completed? No
 
Progress
Differential Diagnosis: gastritis, pancreatitis, PUD/GERD, UTI/pyelo
Plan of Care:
 Orders
 
 
Procedure Date/time Status
 
Clear Liquid Diet 05/24 B Active
 
HEPATIC FUNCTION PANEL 05/24 0600 Active
 
CBC WITHOUT DIFFERENTIAL 05/24 0600 Active
 
BASIC ELECTROLYTES PLUS BUN&CR 05/24 0600 Active
 
Weight 05/24 0243 Active
 
Vital Signs 05/24 0243 Active
 
Teach/Educate 05/24 0243 Active
 
Pain Treatment and Response 05/24 0243 Active
 
Nutritional Intake, Monitor 05/24 0243 Active
 
Isolation 05/24 0243 Active
 
Intake & Output 05/24 0243 Active
 
Patient Care Conference 05/24 0243 Active
 
Activity/Ambulation 05/24 0243 Active
 
Pathway - chart 05/24 0113 Active
 
House Staff 05/24 0113 Active
 
Lab Add-on Test 05/24 0038 Active
 
Patient Data 05/24 0034 Active
 
URINE OSMOLALITY 05/24 0015 Complete
 
URINE LYTES, SPOT 05/24 0015 Complete
 
VTE Mechanical Prophylaxis 05/24  UNK Active
 
MISTAKE 05/24  UNK Active
 
Intake & Output 05/24  UNK Complete
 
Hemoccult 05/24  UNK Active
 
OXYGEN SETUP (GEN) 05/23 2330 Active
 
Saline Lock 05/23 2330 Active
 
Admit to inpatient 05/23 2330 Active
 
Vital Signs 05/23 2330 Active
 
Activity/Ambulation 05/23 2330 Active
 
Code Status 05/23 2330 Active
 
Add-on Test (ER Only) 05/23 2303 Active
 
Add-on Test (ER Only) 05/23 2258 Active
 
Intake & Output 05/23 2237 Active
 
SERUM OSMOLALITY 05/23 2220 Complete
 
DIRECT BILIRUBIN 05/23 2220 Complete
 
TYPE & SCREEN (NOT X-MATCH) 05/23 2220 Complete
 
URINALYSIS 05/23 2200 Complete
 
PARTIAL THROMBOPLASTIN TIME 05/23 2200 Complete
 
PROTHROMBIN TIME 05/23 2200 Complete
 
AMMONIA 05/23 2200 Complete
 
MAGNESIUM 05/23 2200 Complete
 
LIPASE 05/23 2200 Complete
 
COMPREHENSIVE METABOLIC PANEL 05/23 2200 Complete
 
CBC WITHOUT DIFFERENTIAL 05/23 2200 Complete
 
BILIRUBIN TOTAL 05/23 2200 Complete
 
 
 Current Medications
 
 
  Sig/Reagan Start time  Last
 
Medication Dose  Stop Time Status Admin
 
Folic Acid 1 MG DAILY 05/24 0900 AC 
 
(Folic Acid)     
 
Pentoxifylline 400 MG TID 05/24 0900 AC 
 
(Trental 400MG Tab)     
 
Thiamine HCl 100 MG DAILY 05/24 0900 AC 
 
(Vitamin B1)     
 
Midodrine 5 MG 0800,1200,1600 05/24 0800 AC 
 
(Pro-Amatine)     
 
Nicotine 14 MG DAILY AS NEEDED PRN 05/24 0145 AC 
 
(Nicotine Cq)     
 
Oxycodone HCl 5 MG Q8P PRN 05/24 0145 AC 
 
(Roxicodone)     
 
Pantoprazole Sodium 40 MG DAILY 05/24 0145 AC 05/24
 
(Protonix)     0300
 
Magnesium Sulfate 1 GM ONCE ONE 05/24 0115 AC 05/24
 
(Mag Sulfate in D5)   05/24 0514  0300
 
Dextrose/Water 100 ML    
 
(D5W)     
 
 
 Laboratory Tests
 
 
 
05/24/18 0015:
Urinalysis MOD  H, Urine Color ORANG  H, Urine Clarity HAZY  H, Urine pH 6.0, Ur
Specific Gravity 1.010, Urine Protein NEG, Urine Ketones NEG, Urine Nitrite POS 
H, Urine Bilirubin POS@ICTO  H, Urine Urobilinogen 1.0, Ur Leukocyte Esterase 
TRACE  H, Ur Microscopic SEDIMENT EXAMINED, Urine RBC 1-3, Urine WBC 3-5  H, Ur 
Epithelial Cells MOD  H, Urine Bacteria FEW  H, Urine Mucus FEW, Urine 
Hemoglobin NEG, Urine Glucose NEG
 
05/24/18 0015:
Urine Osmolality 417, Ur Random Creatinine 177.4, Ur Random Sodium < 5  L, Ur 
Random Potassium 15.6, Fraction Sodium Excret   
 
05/23/18 2220:
Anion Gap 11, Estimated GFR 57  L, BUN/Creatinine Ratio 38.2  H, Glucose 118  H,
Serum Osmolality 271  L, Calcium 9.0, Magnesium 1.5  L, Total Bilirubin 8.0  H, 
Direct Bilirubin 4.9  H, AST 68  H, ALT 21, Alkaline Phosphatase 159  H, Ammonia
< 9  L, Total Protein 6.5, Albumin 3.2  L, Globulin 3.3, Albumin/Globulin Ratio 
1.0  L, Lipase 1151  H, PT 18.1  H, INR 1.65  H, APTT 34, CBC w Diff NO MAN DIFF
REQ, RBC 2.02  L, .0  H, MCH 35.0  H, MCHC 34.7, RDW 19.0  H, MPV 8.3, 
Gran % 73.9, Lymphocytes % 12.5  L, Monocytes % 12.3  H, Eosinophils % 0.9, 
Basophils % 0.4, Absolute Granulocytes 9.4  H, Absolute Lymphocytes 1.6, 
Absolute Monocytes 1.6  H, Absolute Eosinophils 0.1, Absolute Basophils 0
 
Diagnostic Imaging:
Viewed by Me: CT Scan.  Discussed w/RAD: CT Scan. 
Radiology Impression: large volume of ascites
Initial ED EKG: none
 
Departure
 
Departure
Disposition: STILL A PATIENT
Condition: Stable
Clinical Impression
Primary Impression: Symptomatic anemia
Secondary Impressions: 
Alcoholic cirrhosis of liver with ascites
 
Hepatorenal syndrome
 
Hyponatremia
 
Pancreatitis
Qualifiers:  Chronicity: acute Pancreatitis type: unspecified pancreatitis type 
Acute pancreatitis complication: unspecified Qualified Code: K85.90 - Acute 
pancreatitis without necrosis or infection, unspecified
 
Referrals:
Marianela CORNEJO,Dudley CARROLL (PCP/Family)
 
Departure Forms:
Customer Survey
General Discharge Information
 
Admission Note
Spoke With:
Babatunde CORNEJO,Bernie
Documentation of Exam:
Documentation of any treatments & extenuating circumstances including Concerns 
Regarding Discharge (functional status, medication knowledge or non-compliance, 
living conditions, etc.) that warrant an admission rather than observation: 
Transfusion packed red blood cells serial lab exam medication adjustment GI 
evaluation IR evaluation continuing care discharge planning

## 2018-05-24 VITALS — SYSTOLIC BLOOD PRESSURE: 102 MMHG | DIASTOLIC BLOOD PRESSURE: 48 MMHG

## 2018-05-24 VITALS — DIASTOLIC BLOOD PRESSURE: 50 MMHG | SYSTOLIC BLOOD PRESSURE: 96 MMHG

## 2018-05-24 VITALS — SYSTOLIC BLOOD PRESSURE: 94 MMHG | DIASTOLIC BLOOD PRESSURE: 56 MMHG

## 2018-05-24 VITALS — DIASTOLIC BLOOD PRESSURE: 46 MMHG | SYSTOLIC BLOOD PRESSURE: 98 MMHG

## 2018-05-24 VITALS — SYSTOLIC BLOOD PRESSURE: 100 MMHG | DIASTOLIC BLOOD PRESSURE: 52 MMHG

## 2018-05-24 VITALS — DIASTOLIC BLOOD PRESSURE: 68 MMHG | SYSTOLIC BLOOD PRESSURE: 102 MMHG

## 2018-05-24 VITALS — DIASTOLIC BLOOD PRESSURE: 60 MMHG | SYSTOLIC BLOOD PRESSURE: 104 MMHG

## 2018-05-24 LAB
ABSOLUTE GRANULOCYTE CT: 7.6 /CUMM (ref 1.4–6.5)
ABSOLUTE GRANULOCYTE CT: 7.9 /CUMM (ref 1.4–6.5)
BASOPHILS # BLD: 0 /CUMM (ref 0–0.2)
BASOPHILS # BLD: 0 /CUMM (ref 0–0.2)
BASOPHILS NFR BLD: 0.3 % (ref 0–2)
BASOPHILS NFR BLD: 0.3 % (ref 0–2)
EOSINOPHIL # BLD: 0.1 /CUMM (ref 0–0.7)
EOSINOPHIL # BLD: 0.1 /CUMM (ref 0–0.7)
EOSINOPHIL NFR BLD: 0.8 % (ref 0–5)
EOSINOPHIL NFR BLD: 1.3 % (ref 0–5)
ERYTHROCYTE [DISTWIDTH] IN BLOOD BY AUTOMATED COUNT: 19 % (ref 11.5–14.5)
ERYTHROCYTE [DISTWIDTH] IN BLOOD BY AUTOMATED COUNT: 19.2 % (ref 11.5–14.5)
GRANULOCYTES NFR BLD: 66.6 % (ref 42.2–75.2)
GRANULOCYTES NFR BLD: 69.3 % (ref 42.2–75.2)
HCT VFR BLD CALC: 18.1 % (ref 37–47)
HCT VFR BLD CALC: 19 % (ref 37–47)
LYMPHOCYTES # BLD: 1.8 /CUMM (ref 1.2–3.4)
LYMPHOCYTES # BLD: 1.8 /CUMM (ref 1.2–3.4)
MCH RBC QN AUTO: 35.1 PG (ref 27–31)
MCH RBC QN AUTO: 35.9 PG (ref 27–31)
MCHC RBC AUTO-ENTMCNC: 34.9 G/DL (ref 33–37)
MCHC RBC AUTO-ENTMCNC: 35.3 G/DL (ref 33–37)
MCV RBC AUTO: 100.8 FL (ref 81–99)
MCV RBC AUTO: 101.6 FL (ref 81–99)
MONOCYTES # BLD: 1.5 /CUMM (ref 0.1–0.6)
MONOCYTES # BLD: 1.8 /CUMM (ref 0.1–0.6)
PLATELET # BLD: 140 /CUMM (ref 130–400)
PLATELET # BLD: 146 /CUMM (ref 130–400)
PMV BLD AUTO: 7.6 FL (ref 7.4–10.4)
PMV BLD AUTO: 7.9 FL (ref 7.4–10.4)
RED BLOOD CELL CT: 1.78 /CUMM (ref 4.2–5.4)
RED BLOOD CELL CT: 1.89 /CUMM (ref 4.2–5.4)
WBC # BLD AUTO: 11.4 /CUMM (ref 4.8–10.8)
WBC # BLD AUTO: 11.4 /CUMM (ref 4.8–10.8)

## 2018-05-24 PROCEDURE — 0W9G3ZX DRAINAGE OF PERITONEAL CAVITY, PERCUTANEOUS APPROACH, DIAGNOSTIC: ICD-10-PCS

## 2018-05-24 PROCEDURE — 30233N1 TRANSFUSION OF NONAUTOLOGOUS RED BLOOD CELLS INTO PERIPHERAL VEIN, PERCUTANEOUS APPROACH: ICD-10-PCS | Performed by: INTERNAL MEDICINE

## 2018-05-24 NOTE — CT SCAN REPORT
EXAMINATION:
CT ABDOMEN AND PELVIS WITHOUT CONTRAST
 
CLINICAL INFORMATION:
Abdominal pain. Cirrhosis.
 
COMPARISON:
None
 
TECHNIQUE:
Multidetector volumetric imaging was performed from the superior aspect of
the liver through the pubic symphysis. Sagittal and coronal reformatted
images were obtained on the technologist's workstation.
 
DLP:
300.13 mGy-cm
 
FINDINGS:
 
LUNG BASES: Small layering left pleural effusion.
 
LIVER, GALLBLADDER, AND BILIARY TREE: The liver is normal in size, shape, and
attenuation. No focal hepatic lesion or biliary ductal dilatation is present.
The gallbladder is unremarkable with no evidence of radiopaque gallstones,
gallbladder wall thickening, or obvious pericholecystic inflammatory changes.
 
 
PANCREAS: Unremarkable.
 
SPLEEN: Unremarkable.
 
ADRENAL GLANDS: Unremarkable.
 
KIDNEYS AND URETERS: The kidneys are normal in size, shape, and attenuation.
No hydronephrosis, hydroureter, or calculi seen. No perinephric stranding.
 
BLADDER: Unremarkable.
 
MESENTERY/GASTROINTESTINAL TRACT: There is a large volume of abdominal
ascites. This has a density measurement of fluid, 9 Hounsfield units. No
significant omental thickening.
The fluid limits assessment of bowel. No abnormally dilated bowel loop.
Scattered stool in the colon. The appendix is not identified.
 
ABDOMINAL WALL: No significant hernia is appreciated.
 
LYMPH NODES: No bulky lymphadenopathy.
 
VASCULAR: Unremarkable.
 
PELVIC VISCERA: Unremarkable.
 
OSSEOUS STRUCTURES: Unremarkable.
 
IMPRESSION:
Large volume of abdominal ascites.

## 2018-05-24 NOTE — HISTORY & PHYSICAL
Hanna Garcia 05/24/18 0013:
General Information and HPI
MD Statement:
I have seen and personally examined JOSSELYN BARROS and documented this H&P.
 
The patient is a 32 year old F who presented with a patient stated chief 
complaint of [abdominal pain].
 
Source of Information: patient, old records
Exam Limitations: no limitations
History of Present Illness:
Ms. Sanchez is a 31yo F w/ PMH of alcohol dependence, alcohol liver cirrhosis/
failure with hepatorenal syndrome, from home presented to ER with numerous 
complaints including medication r refill of oxycodone, abdominal/back pain," 
sodium level was off", and came for blood transfusion.
 
Patient was recent discharge from [facility] on 5/9/2018, for treatment of
hyponatremia with fluid restriction, hepatorenal syndrome with IV albumin 
infusion/midodrine/octreotide/pentoxifylline 400 mg p.o. 3 times daily 4 weeks 
starting on 5/5/2018.  Patient underwent EGD 5/2/2018, and was diagnosed with 
stage I esophageal varices, however not a candidate of beta-blocker due to 
hepatorenal syndrome.  Patient was also treated with acute blood loss was 2 
units of PRBC on 4/28/2018.  Patient was discharged home with IM pain meds.  
Patient stated that she got a mechanical fall today after discharge, however 
without any loss of consciousness.  Afterwards patient started to feel some back
pain plus bilateral flank pain, mostly happen at night, however without any 
symptoms of nausea/vomiting/bloody or dark stool.  Patient mostly have bowel 
movement of light brownish stool without any diarrhea events, and claims to be 
compliant with all the medications from last discharge, except that not using 
lactulose on a daily basis.  Patient stated that her bowel size has increased 
about half of the size that she was previously had before the last paracentesis.
 
Prior to the last hospital admission, she was known to be in her usual state of 
health until 3 months ago, when she noticed that she had abdominal distention 
and was evaluated in the emergency room at [facility].  She underwent 
large volume abdominal paracentesis at that time, and and was recommended to 
follow up with Dr. Caceres.  As per the patient, she was started on Lasix and 
spironolactone by Dr. Caceres, and has been compliant with her medications. No 
bleeding per rectum or melena noted.  No nausea vomiting or diarrhea.  She did 
not have any upper or lower endoscopy done. 
 
She drinks alcohol regularly with approximately 2-3 beers in a week, and the 
last drink was the night before last admission, and she had been free of alcohol
ever since discharge from last hospitalization.  History of heavy alcohol use in
the last 5-10 years of wine daily.  No history of alcohol withdrawal seizures, 
or admissions to any other hospital for alcohol detox.  Patient had been 
compliant with nicotine patch daily and have been smoke-free since last 
discharge, however endorsed increased appetite due to use of nicotine patch.  
Patient denied any intravenous drug usage.
 
During our clinical interaction, patient denied fever/lightheadedness/
diaphoresis/night sweat/weight change/cough/SOB/Chest Pain/Palpitation/bowel 
movement or urinary abnormality, or other skin/musculoskeletal/neurological/mood
disorders, or dietary/appetite change.
 
 
 
Allergies/Medications
Allergies:
Coded Allergies:
acetaminophen (From TYLENOL) (HIVES 05/23/18)
ibuprofen (From MOTRIN) (HIVES 05/23/18)
 
 
Past History
 
Travel History
Traveled to [place] past 21 day No
 
Medical History
Neurological: NONE
EENT: NONE
Cardiovascular: NONE
Respiratory: NONE
Gastrointestinal: NONE
Hepatic: ?LIVER FAILURE
Renal: NONE
Musculoskeletal: NONE
Psychiatric: alcohol dependence
Endocrine: NONE
Blood Disorders: NONE
Cancer(s): NONE
GYN/Reproductive: NONE
History of MRSA: No
History of VRE: No
History of CDIFF: No
 
Surgical History
Surgical History: non-contributory
 
Past Family/Social History
 
Family History
Relations & Conditions if any
FATHER (CAD).
 
 
Psychosocial History
Services at Home: None
 
Functional Ability
ADLs
Needs Assist: dressing, eating. 
Ambulation: independent
IADLs
Independent: shopping, housework, telephone.  Unknown: finances, food prep. 
 
Sexual History
Past Sexual History Unobtainable at this time
 
Review of Systems
 
Review of Systems
Constitutional:
Reports: see HPI. 
 
Exam & Diagnostic Data
Last 24 Hrs of Vital Signs/I&O
 Vital Signs
 
 
Date Time Temp Pulse Resp B/P B/P Pulse O2 O2 Flow FiO2
 
     Mean Ox Delivery Rate 
 
05/23 2044 98.0 123 18 127/79  98 Room Air  
 
 
 Intake & Output
 
 
 05/24 0800 05/24 0000 05/23 1600
 
Intake Total  0 
 
Output Total   
 
Balance  0 
 
    
 
Intake, Oral  0 
 
Patient  54.431 kg 
 
Weight   
 
Weight  Reported by Patient 
 
Measurement   
 
Method   
 
 
 
 
Physical Exam
General Appearance Alert, Oriented X3, Cooperative, No Acute Distress
Skin No Rashes, No Breakdown, No Significant Lesion
Skin Temp/Moisture Exam: Warm/Dry
Sepsis Skin Exam (color): Jaundiced
HEENT Atraumatic, PERRLA, Icterus BL eyes, however appeared to be improved from 
last admission Raised kin lesion on left side of nose bridge
Neck Supple, No JVD
Lymphatic Axillary nl, Cervical nl
Cardiovascular Regular Rate, Normal S1, Normal S2
Lungs Clear to Auscultation, Normal Air Movement
Abdomen distended with postive waveform auscultation, no tenderness on palpation
Neurological Normal Speech, Strength at 5/5 X4 Ext, Sensation Intact
Extremities No Edema, Normal Pulses
Rectal No Fissures, Guiac positive, No BRBPR
Last 24 Hrs of Labs/Raghav:
 Laboratory Tests
 
05/24/18 0015:
Urine Color Pending, Urine Clarity Pending, Urine pH Pending, Ur Specific 
Gravity Pending, Urine Protein Pending, Urine Ketones Pending, Urine Nitrite 
Pending, Urine Bilirubin Pending, Urine Urobilinogen Pending, Ur Leukocyte 
Esterase Pending, Ur Microscopic Pending, Urine Hemoglobin Pending, Urine 
Glucose Pending
 
05/23/18 2220:
Anion Gap 11, Estimated GFR 57  L, BUN/Creatinine Ratio 38.2  H, Glucose 118  H,
Calcium 9.0, Magnesium 1.5  L, Total Bilirubin 8.0  H, Direct Bilirubin 4.9  H, 
AST 68  H, ALT 21, Alkaline Phosphatase 159  H, Ammonia < 9  L, Total Protein 
6.5, Albumin 3.2  L, Globulin 3.3, Albumin/Globulin Ratio 1.0  L, Lipase 1151  H
, PT 18.1  H, INR 1.65  H, APTT 34, CBC w Diff NO MAN DIFF REQ, RBC 2.02  L, MCV
101.0  H, MCH 35.0  H, MCHC 34.7, RDW 19.0  H, MPV 8.3, Gran % 73.9, Lymphocytes
% 12.5  L, Monocytes % 12.3  H, Eosinophils % 0.9, Basophils % 0.4, Absolute 
Granulocytes 9.4  H, Absolute Lymphocytes 1.6, Absolute Monocytes 1.6  H, 
Absolute Eosinophils 0.1, Absolute Basophils 0
 
 
Assessment/Plan
Assessment:
On admission,
Vitals: Afebrile, tachycardia 123, RR 18, /79, 98% on room air
 
-CBC: Mild leukocytosis 12.7, microcytic anemia 7.1/20.4 (baseline 10.1 on 5/17/
18).
-BMP: Hyponatremia 126, K4.2, creatinine 1.1 (baseline 0.1 in 5/2018).  
Hypomagnesemia 1.5, elevated alk phos 159, albumin 3.2, lipase 1151
-UA/Microbiology:
-Misc: Paracentesis cytology showed no identification of malignancy.
-Ab CT: ascites, however no pancreatitis
 
 
Problem list & Assessment: 
#Decompensated alcoholic liver cirrhosis
#Hepatorenal syndrome
#Hyponatremia
#Hypomagnesemia
#Borderline macrocytic anemia with unknown etiology, possibly secondary to 
esophageal varices/duodenitis
 
 
Hospital Course:
- Admit to general medicine floor


abdominal pain was most likely due to the increased body weight, ascites causing
chronic back pain/muscular strain, was negative physical examination findings on
spine/imaging/strength or neurological deficits.

thiamine.



 
 
DVT prophylaxis ALPS
Clear liquid diet was 1000 cc fluid restriction
Full Code
 
 
As Ranked By This Provider
Problem List:
 1. Esophageal varices determined by endoscopy
 
 2. Hepatorenal syndrome
 
 3. Anemia
 
 
Core Measures/Misc (9/17)
 
Acute Coronary Syndrome
ACS Diagnosis: No
 
Congestive Heart Failure
Congestive Heart Failure Diagnosis No
 
Cerebrovascular Accident
CVA/TIA Diagnosis: No
 
VTE (View Protocol)
VTE Risk Factors Age>40
No Mechanical VTE Prophylaxis d/t N/A MechProphylax Ordered
No VTE Pharm Prophylaxis d/t Coagulopathy
 
Sepsis (View protocol)
Sepsis Present: No
If YES complete Sepsis Event Note If YES complete Sepsis Event Note
 
Whitney Tejeda 05/24/18 0035:
General Information and HPI
 
Allergies/Medications
Home Med list
Calcium Carb/Magnesium Hydrox (Rolaids Chewable Tablet) (Unknown Strength) 
TAB.CHEW   (Unknown Dose) PO DAILY SUPPLEMENT  (Reported)
Folic Acid 1 MG TABLET   1 MG PO DAILY vitamin deficiency
     .
Lactulose 10 GRAM/15 ML SOLUTION   15 ML PO DAILY PRN CONSTIPATION  (Reported)
Midodrine HCl 2.5 MG TABLET   5 MG PO 0800,1200,1600 Liver disease
     .
Nicotine (Nicotine Patch) 14 MG/24 HOUR PATCH.TD24   14 MG TOP DAILY AS NEEDED 
PRN tobacco use
     .
Omeprazole 20 MG CAPSULE.DR   40 MG PO DAILY AC Acid reflux
Pentoxifylline 400 MG TABLET.ER   400 MG PO TID Liver disease
     .
Riboflavin (Vitamin B-2) 25 MG TABLET   1 TAB PO DAILY SUPPLEMENT  (Reported)
Thiamine HCl (Vitamin B-1) 100 MG TABLET   100 MG PO DAILY vitamin deficiency
     .
 
 
 
Core Measures/Misc (9/17)
 
Sepsis (View protocol)
If YES complete Sepsis Event Note If YES complete Sepsis Event Note
 
Resident Review Statement
Resident Statement: examined this patient, discussed with intern
Other Findings:
 
 
Ms Barros is a 32-year-old female with a PMHx of alcohol abuse, came to the 
hospital with a chief concern of abdominal discomfort x 7-10 days.  She was 
recently admitted to intensive care unit for hyponatremia of 117 and 
decompensated alcoholic liver cirrhosis at which time she underwent EGD which 
revealed varices.  At that time, hepatorenal syndrome was also considered in 
differential and was started on Midrodrine.  Ultrasound-guided paracentesis was 
done, which was negative for SBP.
 
After she was discharged, she had a mechanical fall at which time she did not 
have any injury to her back.  She gradually developed abdominal discomfort, 
progressing to pain in the flank region, with no radiation.  Severity 6-7/10, 
mostly after lying down in the bed at night.  She also reported pain in her 
lower back, with no radiation and neurological symptoms.  No fever, chills, or 
dysuria.  No chest pain, palpitations, dyspnea reported.  2 days after she 
developed abdominal pain, she came to [facility] at which time she was given 
oxycodone which relieved her symptoms.  Did not have any melena, bleeding per 
rectum, diarrhea or vomiting.  Reported occasional nausea, but no change in 
appetite.  No lightheadedness, dizziness reported.  No pedal edema, change in 
color of urine.  Continues to have icterus, with improving rash on her torso.  
Reported rash below left eye, which is noted to be worsening.  He reported 
compliance to her medications.  
 
Reported sobriety, and last drink was one month ago.  No intravenous drug use. 
Reports that she understands that she should not drink alcohol anymore. 
 
At the time of admission-but temperature 98.0, pulse rate 120, respiration 18, 
blood pressure 127/79, pulse ox 98% on room air.  
 
General Exam: AAOx3, No acute distress, Skin: spider angimata on the torso, rash
on below left eye no breakdown;HEENT: PERRLA, EOMI;Neck: Supple, No JVD; No 
cervical lymphadenopathy;CVS: tachycardia, Reg Rate, Normal S1,S2, No MGR;Resp: 
Normal air entry, no ronchi/rales;Abdomen: distended, positve fluid thrill, No 
tenderness, Normal Bowel Sounds, no hepatomegaly;Neuro: Normal Speech, Strength 
5/5 b/l x 4 extremities, Sensation intact, CN III-XII NL, Reflexes 2+;
Extremities: No cyanosis, 1+ pedal edema, asterexis +
 
Pertinent lab findings WBC 12.7, hemoglobin 7.1 (last hemoglobin 10. on 5/17/
2018), .1.,  Platelet count 165, sodium 126, potassium 4.2, chloride 89, 
bicarbonate 26, anion gap 11, BUN 42, creatinine 1.1.(Creatinine 0.9 on 05/09/
2018).  Magnesium 1.5, lipase 1151.
 
Liver chemistries-AST 68, ALT 21, total bilirubin 8.0, direct bilirubin 4.9, 
alkaline phosphatase 159, ammonium 9.  INR 1.65. 
 
Maddrey discriminant function- 33.8 w/ 30 day mortality rate sid 50% ( she is 
being treated w/ pentoxyphylline currently)
MELD-Na score: 28. 
Yoli Class B
 
Upper endoscopy: 05/02/181.  
 
1. Portal hypertensive gastropathy with hemorrhagic spots
2.  Duodenitis
3.  Grade 1 esophageal varices with the stigmata of bleeding.  Varices were 
small and had complete effacement with air insufflation.
 
Etiology in her case of having persistent abdominal pain is likely due to 
abdominal distention that could be leading to abdominal pain.  Other causes such
as pancreatitis, ascites leading to SBP could be differentials.  Her kidney 
function is improving at this time, with stable blood pressure, Midrin should be
continued given her previous consideration of hepatorenal syndrome.  Infection 
should be treated as soon as possible, if she develops any fever.
 
In regards to her hyponatremia, she likely has hypoosmolar hypervolemic 
hyponatremia for which she needs to be on 2 g sodium restricted diet and fluid 
restriction up to 1000 mL.  No diuretics at this time.  The management of 
ascites, to be done as per GI, with the consideration of large volume 
paracentesis in the a.m, prior to which she should be given albumin.
 
 
 
#1 acute hyponatremia, hypoosmolar hyponatremia
#2 alcoholic cirrhosis
#3 ascites-decompensated liver cirrhosis
#4 leukocytosis
#5 pancreatitis
#6 anemia, likely iron deficiency and anemia of chronic disease.
#7 alcohol abuse
#8 alcoholic hepatitis
#9 hyperbilirubinemia
#10 pancreatitis
#11 macrocytosis
#12 hypomagnesemia
 
-Hyponatremia-Recheck urine lite, Sr+U Osm likely has Hypervolemic hypoosmolar 
hyponatremia could likely be due to cirrhosis, and decreased intravascular 
volume.  Check BEP every 8-12 hours, with a goal correction of not more than 8 
mEq in the next 24 hours.  If the BEP is overly corrected, would use D5 water.  
Fluid restriction at this time of not more than 1000 ml per day.
 
Anemia-could be multifactorial, including iron deficiency+folate/B12 def, and GI
blood loss.  Last EGD revealed grade 1 esophageal varices, but stigmata of 
bleeding which could be causing low H&H.  Defer the decision to the GI for 
further endoscopic workup at this time.  H&H to be kept at 7.0 at this time.  If
the patient develops any overt GI bleed, she should be treated with ceftriaxone 
and albumin.  IV Protonix.
 
Ascites-is likely due to cirrhosis.  Consideration for large volume paracentesis
including albumin, protein, Gram stain and culture to be done in the a.m.  If 
the patient has any fever, or has worsening abdominal pain, consider SBP and 
panculture.  She also has leukocytosis, which should be monitored closely.
 
Alcoholic cirrhosis-has discriminant function of 33, which seems to be improved 
from the last admission.  She started on pentoxifylline for alcoholic hepatitis,
which should be continued for a total of 28 days.  Although she did not have any
liver biopsy done, it is likely that she has liver cirrhosis and has increased 
portal pressures.  She should be counseled extensively for abstaining from 
alcohol use.  She has not been listed for transplant yet.  She is also on not on
any nonspecific beta blocker given her varices.  In regards to abnormal liver 
function, which should be monitored closely with daily liver function tests.
 
Abdominal pain-is likely multifactorial, but pancreatitis is also in 
differential given her elevated lipase.  Unfortunately, she cannot receive 
fluids given her hypervolemic state.  She needs to be monitored closely.  Start 
oxycodone 5 mg by mouth every 8.  Although human studies are not done yet, but 
would avoid morphine given evidence of worsening pancreatitis when morphine is 
used in animal models (Guilherme et al, Gut 10.1136/[id number]).
 
Hypomagnesemia-electrolytes need to be monitored closely and replenished 
accordingly.
 
Alcohol abuse-states that she has not had alcohol in the last 1 month.  If she 
develops any signs of withdrawal, she should be started on CIWA protocol.
 
DVT PPx- Alps at this time, given heme positive stools, and anemia. Once 
resolved, she should be on heparin sc given abnormal liver function. 
GI PPx- Protonix iv 
HE PPx- Lactulose
 
Last EGD date: 05/02/18
Last Screening US: 04/29/18
Hepatitis vaccination: Hepatitis B antibody nonreactive.  Immunization to be 
considered.
Transplant evaluation: unknown, and would need sobriety for almost an year. 
 
 
 
 
Babatunde CORNEJO, [facility] 05/24/18 0420:
Core Measures/Misc (9/17)
 
Sepsis (View protocol)
If YES complete Sepsis Event Note If YES complete Sepsis Event Note
 
Attending MD Review Statement
 
Attending Statement
Attending MD Statement: examined this patient, discuss w/resident/PA/NP, agreed 
w/resident/PA/NP, reviewed images, amended to note
Attending Assessment/Plan:
33 yo F smoker with h/o alcohol abuse, alcoholic liver cirrhosis who was 
admitted (04/28 05/09) for hyponatremia, alcoholic hepatitis, ascites without 
SBP s/p paracentesis, complicated by BRETT likely HRS, anemia with EGD s/o 
esophageal varices grade 1/ duodenitis, is here for evaluation of bilateral 
flank pain, back pain and abdominal distension over the past 1 week. She was 
seen in the ER on May 11th for back pain and was prescribed oxycodone. Her 
abdomen has gradually become more distended. When questioned, she agrees that 
the abdominal distension could be contributing to her flank pain and poor back 
curvature causing her back pain. No back trauma. She denies melena, BRBPR, 
hematemesis. She denies NSAID use. She has been taking all her medications.
 
Vitals stable except for tachycardia to 100's. Exam: pallor+, icterus+, skin 
jaundiced, cachexia, spider angiomas noted. Abdomen: soft, NT, distended, 
ascites+. Left infra-orbital region  erythematous area with small growth noted 

1.65, Na 126, BUN 42, creat 1.1 (baseline), glucose 118, Mag 1.5, T. Bili 8.0, 
D. Bili 4.9, AST 68, ALT 21, ammonia negative, lipase 1151. CT abd/pelvis: large
volume ascites.
 
Assessment and plan:
1. Abdominal/ flank pain with recurrent ascites
2. Acute on chronic anemia of blood loss and chronic disease
3. Alcoholic cirrhosis/ hepatitis with Maddrey's score of 36
4. Chronic hyponatremia on fluid restriction
5. Hypomagnesemia
6. Coagulopathy
7. Leukocytosis likely reactive
 
- Admit to General medicine
- Type and crossmatch, goal Hb > 7.0
- Follow CBC in AM
- Guaiac all stools
- Continue pentoxifyline for total 28 days, midodrine
- Fluid restriction of 1000 mls/24 hours
- GI consult
- IR guided therapeutic paracentesis with albumin infusion in AM
- Rule out SBP, although this seems less likely
- IV protonix
- No NSAIDs
- Trend LFTs
- Pain management with oxycodone and as needed morphine.
- Replete electrolytes, resume folic acid, thiamine, B2 and lactulose
- Smoking cessation counseling, nicotine patch
DVT ppx Alps. Full code.

## 2018-05-24 NOTE — CONS- GASTROENTEROLOGY
General Information and HPI
 
Consulting Request
Date of Consult: 05/24/18
Requested By:
Babatunde CORNEJO,Bernie
 
Reason for Consult:
Etoh cirrhosis, ascites, abdominal pain, anemia.
Source of Information: patient, old records
Exam Limitations: no limitations
History of Present Illness:
Ms. Barros is a 32-year-old female with a history of alcohol abuse and alcoholic
cirrhosis who presented to [facility] yesterday with complaints of 
worsening abdominal pain and distention as well as back pain from a mechanical 
fall.  She notes that the main reason she came in was to get a refill on her 
medications and to 'check blood work' as she had not heard back from her PCP who
did blood work a few days ago.  She was discharged from [facility] earlier
this month after a hospitalization for ascites, hyponatremia and renal 
insufficiency during which time she underwent an upper endoscopy which showed 
grade 1 varices and portal hypertensive gastropathy. She also underwent a large-
volume paracentesis removing approximately 4.7 L and she was started on 
pentoxifylline for alcoholic hepatitis with an elevated discriminate function.  
She was also noted to have renal insufficiency on admission along with 
hyponatremia.  The hyponatremia was treated with fluid restriction and the renal
insufficiency was treated as hepatorenal syndrome with midodrine and octreotide.
 She has continued on the midodrine as an outpatient, but not the octreotide.  
She notes that since discharge she has been compliant with her medications, but 
she was not sent home on diuretics secondary to her renal insufficiency and she 
has been having worsening abdominal distention since discharge.  She states that
she has not had any alcohol since she was discharged from the hospital earlier 
this month.  She has been without any upper abdominal pain with eating, nor does
she complain of any heartburn, dysphagia or vomiting.  She is without any 
hematochezia or black tarry stool.  
 
 
Allergies/Medications
Allergies:
Coded Allergies:
acetaminophen (From TYLENOL) (HIVES 05/23/18)
ibuprofen (From MOTRIN) (HIVES 05/23/18)
 
Home Med List:
Calcium Carb/Magnesium Hydrox (Rolaids Chewable Tablet) (Unknown Strength) 
TAB.CHEW   (Unknown Dose) PO DAILY SUPPLEMENT  (Reported)
Folic Acid 1 MG TABLET   1 MG PO DAILY vitamin deficiency
     .
Lactulose 10 GRAM/15 ML SOLUTION   15 ML PO DAILY PRN CONSTIPATION  (Reported)
Midodrine HCl 2.5 MG TABLET   5 MG PO 0800,1200,1600 Liver disease
     .
Nicotine (Nicotine Patch) 14 MG/24 HOUR PATCH.TD24   14 MG TOP DAILY AS NEEDED 
PRN tobacco use
     .
Omeprazole 20 MG CAPSULE.DR   40 MG PO DAILY AC Acid reflux
Pentoxifylline 400 MG TABLET.ER   400 MG PO TID Liver disease
     .
Riboflavin (Vitamin B-2) 25 MG TABLET   1 TAB PO DAILY SUPPLEMENT  (Reported)
Thiamine HCl (Vitamin B-1) 100 MG TABLET   100 MG PO DAILY vitamin deficiency
     .
 
Current Medications:
 Current Medications
 
 
  Sig/Reagan Start time  Last
 
Medication Dose Route Stop Time Status Admin
 
Albumin Human 12.5 GM ONCE ONE 05/24 1500 DC 
 
  IV 05/24 1501  
 
Folic Acid 1 MG DAILY 05/24 0900 AC 05/24
 
  PO   0857
 
Lactulose 15 GM DAILY PRN 05/24 0545 AC 
 
  PO   
 
Magnesium Sulfate 1 GM ONCE ONE 05/24 0115 DC 05/24
 
Dextrose/Water 100 ML IV 05/24 0514  0300
 
Midodrine 5 MG 0800,1200,1600 05/24 0800 AC 05/24
 
  PO   1150
 
Morphine Sulfate 0 .STK-MED ONE 05/23 2224 DC 
 
  .ROUTE   
 
Morphine Sulfate 4 MG ONCE ONE 05/23 2200 DC 05/23
 
  IV 05/23 2201  2234
 
Nicotine 14 MG DAILY AS NEEDED PRN 05/24 0145 AC 05/24
 
  TOP   0413
 
Oxycodone HCl 5 MG Q8P PRN 05/24 0145 AC 05/24
 
  PO   1033
 
Pantoprazole Sodium 40 MG DAILY 05/24 0145 AC 05/24
 
  IV   0300
 
Pentoxifylline 400 MG TID 05/24 0900 AC 05/24
 
  PO   1421
 
Thiamine HCl 100 MG DAILY 05/24 0900 AC 05/24
 
  PO   0857
 
 
 
 
Past History
 
Travel History
Traveled to [place] past 21 day No
 
Medical History
Blood Transfusion Hx: Yes
Neurological: NONE
EENT: NONE
Cardiovascular: NONE
Respiratory: NONE
Gastrointestinal: NONE
Hepatic: cirrhosis, ?LIVER FAILURE
Renal: NONE
Musculoskeletal: NONE
Psychiatric: alcohol dependence
Endocrine: NONE
Blood Disorders: anemia
Cancer(s): NONE
GYN/Reproductive: NONE
 
Surgical History
Surgical History: 1
 
Family History
Relations & Conditions If Any:
FATHER (CAD).
 
 
Psychosocial History
Where Do You Live? Home
Services at Home: None
Smoking Status: Former Smoker
 
Functional Ability
ADLs
Needs Assist: dressing, eating. 
Ambulation: independent
IADLs
Independent: shopping, housework, telephone.  Unknown: finances, food prep. 
 
Review of Systems
 
Review of Systems
Constitutional:
Reports: malaise, weakness.  Denies: chills, diaphoresis, fever. 
EENTM:
Reports: icterus. 
Cardiovascular:
Denies: no symptoms. 
Respiratory:
Reports: short of breath.  Denies: cough, hemoptysis, sputum production. 
GI:
Reports: see HPI. 
Genitourinary:
Denies: no symptoms. 
Musculoskeletal:
Reports: back pain, joint pain.  Denies: joint swelling. 
Skin:
Reports: jaundice. 
Neurological/Psychological:
Denies: no symptoms. 
Hematologic/Endocrine:
Denies: no symptoms. 
Immunologic/Allergic:
Denies: no symptoms. 
All Other Systems: Reviewed and Negative
 
Exam & Diagnostic Data
Vital Signs and I&O
Vital Signs
 
 
Date Time Temp Pulse Resp B/P B/P Pulse O2 O2 Flow FiO2
 
     Mean Ox Delivery Rate 
 
05/24 1411 98.3 104 18 102/68  98 Room Air  
 
05/24 1305 98.3 104 18 102/68  98 Room Air  
 
05/24 1100 98.3 100 20 104/60  97 Room Air  
 
05/24 1040 98.7 100 18 94/56  96 Room Air  
 
05/24 0802    96/50     
 
05/24 0647 99.0 108 18 98/46  96   
 
05/24 0204 98.1 104 18 102/48  99   
 
05/24 0123 98.3 98 18 104/58  97 Room Air  
 
05/23 2044 98.0 123 18 127/79  98 Room Air  
 
 
 Intake & Output
 
 
 05/24 1600 05/24 0400 05/23 1600 05/23 0400 05/22 1600 05/22 0400
 
Intake Total 100 0    
 
Output Total      
 
Balance 100 0    
 
       
 
Intake,      
 
Intake, Oral  0    
 
Patient 110 lb 110 lb    
 
Weight      
 
Weight Bed scale Bed scale    
 
Measurement      
 
Method      
 
 
 
 
Physical Exam
General Appearance: no apparent distress, alert, awake, comfortable, cachetic
Head: atraumatic
Eyes:
Bilateral: other (scleral icterus). 
Ears, Nose, Throat: normal pharynx
Neck: normal inspection, supple, full range of motion
Respiratory: normal breath sounds, chest non-tender, no respiratory distress
Cardiovascular: regular rate/rhythm
Gastrointestinal: normal bowel sounds, soft, non-tender, distention, Positive 
fluid wave
Rectal: deferred
Back: normal inspection, normal range of motion
Extremities: no edema
Neurologic/Psych: no motor/sensory deficits, awake, alert, oriented x 3, no 
asterixis
Skin: intact, jaundice
 
Results
Pertinent Lab Results:
 Laboratory Tests
 
 
 05/24 05/24
 
 0725 0015
 
Chemistry  
 
  Sodium (137 - 145 mmol/L) 127  L 
 
  Potassium (3.5 - 5.1 mmol/L) 4.1 
 
  Chloride (98 - 107 mmol/L) 90  L 
 
  Carbon Dioxide (22 - 30 mmol/L) 27 
 
  Anion Gap (5 - 16) 10 
 
  BUN (7 - 17 mg/dL) 40  H 
 
  Creatinine (0.5 - 1.0 mg/dL) 1.1  H 
 
  Estimated GFR (>60 ml/min) 57  L 
 
  BUN/Creatinine Ratio (7 - 25 %) 36.4  H 
 
  Magnesium (1.6 - 2.3 mg/dL) 1.8 
 
  Total Bilirubin (0.2 - 1.3 mg/dL) 8.2  H 
 
  Direct Bilirubin (< 0.4 mg/dL) 4.9  H 
 
  AST (14 - 36 U/L) 63  H 
 
  ALT (9 - 52 U/L) 25 
 
  Alkaline Phosphatase (<127 U/L) 145  H 
 
  Total Protein (6.3 - 8.2 g/dL) 6.0  L 
 
  Albumin (3.5 - 5.0 g/dL) 2.7  L 
 
Hematology  
 
  CBC w Diff NO MAN DIFF REQ 
 
  WBC (4.8 - 10.8 /CUMM) 11.4  H 
 
  RBC (4.20 - 5.40 /CUMM) 1.89  L 
 
  Hgb (12.0 - 16.0 G/DL) 6.6 *L 
 
  Hct (37 - 47 %) 19.0 *L 
 
  MCV (81.0 - 99.0 FL) 100.8  H 
 
  MCH (27.0 - 31.0 PG) 35.1  H 
 
  MCHC (33.0 - 37.0 G/DL) 34.9 
 
  RDW (11.5 - 14.5 %) 19.0  H 
 
  Plt Count (130 - 400 /CUMM) 146 
 
  MPV (7.4 - 10.4 FL) 7.9 
 
  Gran % (42.2 - 75.2 %) 69.3 
 
  Lymphocytes % (20.5 - 51.1 %) 16.0  L 
 
  Monocytes % (1.7 - 9.3 %) 13.6  H 
 
  Eosinophils % (0 - 5 %) 0.8 
 
  Basophils % (0.0 - 2.0 %) 0.3 
 
  Absolute Granulocytes (1.4 - 6.5 /CUMM) 7.9  H 
 
  Absolute Lymphocytes (1.2 - 3.4 /CUMM) 1.8 
 
  Absolute Monocytes (0.10 - 0.60 /CUMM) 1.5  H 
 
  Absolute Eosinophils (0.0 - 0.7 /CUMM) 0.1 
 
  Absolute Basophils (0.0 - 0.2 /CUMM) 0 
 
Urines  
 
  Urinalysis  MOD  H
 
  Urine Color (YEL,AMB,STR)  ORANG  H
 
  Urine Clarity (CLEAR)  HAZY  H
 
  Urine pH (5.0 - 8.0)  6.0
 
  Ur Specific Gravity (1.001 - 1.035)  1.010
 
  Urine Protein (NEG,<30 MG/DL)  NEG
 
  Urine Ketones (NEG)  NEG
 
  Urine Nitrite (NEG)  POS  H
 
  Urine Bilirubin (NEG)  POS@ICTO  H
 
  Urine Urobilinogen (0.1  -  1.0 EU/dl)  1.0
 
  Ur Leukocyte Esterase (NEG)  TRACE  H
 
  Ur Microscopic  SEDIMENT EXAMINED
 
  Urine RBC (0 - 5 /HPF)  1-3
 
  Urine WBC (0 - 2 /HPF)  3-5  H
 
  Ur Epithelial Cells (NONE,FEW)  MOD  H
 
  Urine Bacteria (NEG/NONE)  FEW  H
 
  Urine Mucus (FEW,NONE)  FEW
 
  Urine Hemoglobin (NEG)  NEG
 
  Urine Glucose (N MG/DL)  NEG
 
 
 
 
 05/24 05/23
 
 0015 2220
 
Chemistry  
 
  Sodium (137 - 145 mmol/L)  126  L
 
  Potassium (3.5 - 5.1 mmol/L)  4.2
 
  Chloride (98 - 107 mmol/L)  89  L
 
  Carbon Dioxide (22 - 30 mmol/L)  26
 
  Anion Gap (5 - 16)  11
 
  BUN (7 - 17 mg/dL)  42  H
 
  Creatinine (0.5 - 1.0 mg/dL)  1.1  H
 
  Estimated GFR (>60 ml/min)  57  L
 
  BUN/Creatinine Ratio (7 - 25 %)  38.2  H
 
  Glucose (65 - 99 mg/dL)  118  H
 
  Serum Osmolality (285 - 295 MOSM/KG)  271  L
 
  Calcium (8.4 - 10.2 mg/dL)  9.0
 
  Magnesium (1.6 - 2.3 mg/dL)  1.5  L
 
  Total Bilirubin (0.2 - 1.3 mg/dL)  8.0  H
 
  Direct Bilirubin (< 0.4 mg/dL)  4.9  H
 
  AST (14 - 36 U/L)  68  H
 
  ALT (9 - 52 U/L)  21
 
  Alkaline Phosphatase (<127 U/L)  159  H
 
  Ammonia (9 - 30 umol/L)  < 9  L
 
  Total Protein (6.3 - 8.2 g/dL)  6.5
 
  Albumin (3.5 - 5.0 g/dL)  3.2  L
 
  Globulin (1.9 - 4.2 gm/dL)  3.3
 
  Albumin/Globulin Ratio (1.1 - 2.2 %)  1.0  L
 
  Lipase (23 - 300 U/L)  1151  H
 
Coagulation  
 
  PT (9.4 - 12.5 SEC)  18.1  H
 
  INR (0.90 - 1.19)  1.65  H
 
  APTT (25 - 37 SEC)  34
 
Hematology  
 
  CBC w Diff  NO MAN DIFF REQ
 
  WBC (4.8 - 10.8 /CUMM)  12.7  H
 
  RBC (4.20 - 5.40 /CUMM)  2.02  L
 
  Hgb (12.0 - 16.0 G/DL)  7.1 *L
 
  Hct (37 - 47 %)  20.4  L
 
  MCV (81.0 - 99.0 FL)  101.0  H
 
  MCH (27.0 - 31.0 PG)  35.0  H
 
  MCHC (33.0 - 37.0 G/DL)  34.7
 
  RDW (11.5 - 14.5 %)  19.0  H
 
  Plt Count (130 - 400 /CUMM)  165
 
  MPV (7.4 - 10.4 FL)  8.3
 
  Gran % (42.2 - 75.2 %)  73.9
 
  Lymphocytes % (20.5 - 51.1 %)  12.5  L
 
  Monocytes % (1.7 - 9.3 %)  12.3  H
 
  Eosinophils % (0 - 5 %)  0.9
 
  Basophils % (0.0 - 2.0 %)  0.4
 
  Absolute Granulocytes (1.4 - 6.5 /CUMM)  9.4  H
 
  Absolute Lymphocytes (1.2 - 3.4 /CUMM)  1.6
 
  Absolute Monocytes (0.10 - 0.60 /CUMM)  1.6  H
 
  Absolute Eosinophils (0.0 - 0.7 /CUMM)  0.1
 
  Absolute Basophils (0.0 - 0.2 /CUMM)  0
 
Urines  
 
  Urine Osmolality (300 - 1000 MOSM/KG) 417 
 
  Ur Random Creatinine (mg/dL) 177.4 
 
  Ur Random Sodium (30 - 90 mmol/L) < 5  L 
 
  Ur Random Potassium (mmol/L) 15.6 
 
  Fraction Sodium Excret (<1% %)    
 
 
 
Imaging/Other Studies:
Endoscopy Procedure
Medical History: unchanged
Mental Status: alert/oriented
Heart/Lung Eval Prior to Sedation: within normal limits
Candidate for Sedation? Yes
Procedure Date: 05/02/18
Procedure Type: EGD w/biopsy
:
MD Walters Deborah E.
 
ASA Classification: III
Indications:
1.  Chronic blood loss anemia
2.  Cirrhosis
 
Instrument: diagnostic gastroscope
Meds Received: MAC
Patient's Tolerance: good
Complications: none
Extent Reached: second part of duodenum
Procedure:
Note: Informed consent was obtained prior to procedure.  Risks and benefits of 
procedure were discussed with patient.  Potential complications discussed 
included perforation, bleeding, abdominal pain, and adverse reaction to 
medications.  It was explained that iany or all of these complications could 
result in the need for extended hospitalization, emergency surgery, transfusion 
of packed red blood cells (with the risk of HIV or hepatitis virus), intubation 
with mechanical ventilation, and possible need for antibiotics.  It was further 
explained that an existing tumor polyp or mucosal abnormality might not be 
identified at the time of the procedure thus resulting in a missed opportunity 
for early diagnosis and treatment of a gastrointestinal malignancy or disease 
with possible interval development of a gastrointestinal cancer or other disease
with possible worsening of clinical condition in the interval between 
endoscopies.  It was also discussed that complications are not limited to those 
listed above.  Possible alternatives to endoscopic treatment or evaluation were 
discussed.  All questions were answered.
 
Continuous EKG and blood pressure monitors were attached.  Supplemental oxygen 
was provided with O2 Sat monitoring.  
 
Patient was placed in the left lateral decubitus position.  A surgical timeout 
was performed.  All persons in the room were identified.  All concerns were 
expressed and answered.
 
A bite block was placed in the mouth and sedation was administered by anesthesia
and titrated to comfort prior to starting the procedure.  The Olympus upper 
endoscope was advanced under direct vision to the level of the third portion of 
the duodenum.
 
Esophagus:  The GE junction was identified and was normal.  The Z line was 
located at 35 cm from the incisors.  There were grade 1 esophageal varices with 
stigmata of bleeding at the GE junction.  These extended to 30 cm from the 
incisors and completely effaced with air insufflation.  There was marked 
thinning of esophageal mucosa over the esophageal varices.
 
Stomach: The stomach had diffuse erythema with erosion.  There was mild 
nodularity in the antrum and prepyloric region with erosion..  Retroflexed view 
of the cardiofundic region revealed a snake-skinned reticulated pattern with 
hemorrhagic spots consistent with severe portal hypertensive gastropathy.  There
were no gastric varices.  The GE junction was seen in retroflexion and cherry 
red spots and red whale sign were noted at the GE junction.  There were normal 
rugae and normal distensibility.  The pylorus was patent and easily intubated.  
Biopsies were obtained from the antrum, angularis, gastric body and lesser 
curvature to rule out H. Pylori.
 
Duodenum: The duodenum was fully examined from bulb down to the third portion.  
There was patchy erythema and erosion throughout the entire visualized 
duodenum..
 
With the endoscope in the forward-viewing position, it was slowly withdrawn and 
all areas were re-inspected and findings are as described previously.  Patient 
tolerated the procedure well.
 
EBL: Minimal
 
Specimens Removed:  antrum, angularis, gastric body and lesser curvature to rule
out H. Pylori.
 
Findings:
1.  Portal hypertensive gastropathy with hemorrhagic spots
2.  Duodenitis
3.  Grade 1 esophageal varices with the stigmata of bleeding.  Varices were 
small and had complete effacement with air insufflation.
 
SERVICE DATE: 05/23/
EXAM TYPE: CAT - CT ABD & PELVIS W/O IV CONTRAS
 
EXAMINATION:
CT ABDOMEN AND PELVIS WITHOUT CONTRAST
 
CLINICAL INFORMATION:
Abdominal pain. Cirrhosis.
 
COMPARISON:
None
 
TECHNIQUE:
Multidetector volumetric imaging was performed from the superior aspect of
the liver through the pubic symphysis. Sagittal and coronal reformatted
images were obtained on the technologist's workstation.
 
DLP:
300.13 mGy-cm
 
FINDINGS:
 
LUNG BASES: Small layering left pleural effusion.
 
LIVER, GALLBLADDER, AND BILIARY TREE: The liver is normal in size, shape, and
attenuation. No focal hepatic lesion or biliary ductal dilatation is present.
The gallbladder is unremarkable with no evidence of radiopaque gallstones,
gallbladder wall thickening, or obvious pericholecystic inflammatory changes.
 
 
PANCREAS: Unremarkable.
 
SPLEEN: Unremarkable.
 
ADRENAL GLANDS: Unremarkable.
 
KIDNEYS AND URETERS: The kidneys are normal in size, shape, and attenuation.
No hydronephrosis, hydroureter, or calculi seen. No perinephric stranding.
 
BLADDER: Unremarkable.
 
MESENTERY/GASTROINTESTINAL TRACT: There is a large volume of abdominal
ascites. This has a density measurement of fluid, 9 Hounsfield units. No
significant omental thickening.
The fluid limits assessment of bowel. No abnormally dilated bowel loop.
Scattered stool in the colon. The appendix is not identified.
 
ABDOMINAL WALL: No significant hernia is appreciated.
 
LYMPH NODES: No bulky lymphadenopathy.
 
VASCULAR: Unremarkable.
 
PELVIC VISCERA: Unremarkable.
 
OSSEOUS STRUCTURES: Unremarkable.
 
IMPRESSION:
Large volume of abdominal ascites.
 
Assessment/Plan
Assessment/Recommendations:
Assessment: Ms. Barros is a 32 year old female with decompensated alcoholic 
cirrhosis who presents now after a mechanical fall with worsening ascites and 
anemia which are both likely related to her underlying cirrhosis.  As her 
diuretics were discontinued on her last hospitalization secondary to renal 
insufficiency her ascites has recurred and she is currently getting a large 
volume paracentesis for this and I feel it would be reasonable to consider 
restarting the diuretics after that to prevent any recurrent ascites.  She does 
have some mild renal insufficiency now, but I don't necessarily feel she has 
hepatorenal syndrome.  I also don't necessarily feel the pentoxifilene needs to 
be continued as the data that shows the benefit of this is limited at best, her 
bilirubin hasn't improved since she's been on it mady the past few weeks and it 
could potentially be leading to worsening anemia/bleeding form portal 
hypertensive gastropathy.
 
Recommendations:
1.  Follow up diagnoststic paracentesis results and if it is negative for SBP 
would consider a large volume paracentesis in the am with albumin provided her 
renal function is stable.
2.  d/c pentoxifilene
3.  Start oral iron
4.  Monitor for signs of etoh withdrawal and treat with benzodiazepenes as 
needed which shouldn't be an issue if she has been sober for the past month.
5.  Low sodium diet
6.  Would give albumin 12.5 gm iv q8hr x 48 hours
7.  Monitor lytes and renal function
8.  Would monitor renal function off of midodrine
9.  Check urine lytes
10. Follow hgb and transfuse as needed to keep hgb > 7
11. Continue lactulose
 
Dr. Walters will resume her GI care in the am.
Problem List:
 1. Alcoholic cirrhosis of liver with ascites
 
 2. Symptomatic anemia
 
Copies To:
Marianela CORNEJO,Dudley CARROLL
 
Consult Acknowledgment
- Thank you for your consult request.

## 2018-05-24 NOTE — ADMISSION CERTIFICATION
Admission Certification
 
Certification Statement
- As attending physician, I certify that at the time of
- admission, based on clinical presentation, severity of
- symptoms, need for further diagnostic testing and
- therapeutic interventions, and risk of adverse outcomes
- without in-hospital treatment, in my clinical assessment,
- this patient requires an acute hospital stay for a minimum
- of two nights or longer. I have also considered psychsocial
- factors such as support system, advanced age, financial
- issues, cognitive issues, and failed out-patient treatments,
- past re-admission history, safety of patient, and lack of
- compliance as applicable.
Specific rationale supporting this admission is:
31 yo female with alcoholic cirrhosis, acute on chronic anemia of blood loss, 
hyponatremia and recurrent ascites.

## 2018-05-24 NOTE — PN- ATT ADDEND
Attending Addendum
Attending Brief Note
Patient seen and examined.  Plan of care discussed with the medical team and the
patient.  Available lab work and radiology test reports were reviewed.  Patient 
is lying in bed comfortably and denies any acute pain fever chills difficulty 
breathing.  He denies any nausea vomiting.   
 
Exam:
General: Patient awake slightly lethargic but oriented without any distress ; 
patient is emeciated
CVS: S1 plus S2 without any murmur or gallops 
Chest: Few scattered crepitation without any wheeze.  There is no respiratory 
distress. 
Abdomen: Soft non-tender, bowel sound present, no guarding or rebound 
CNS: Awake  oriented without any focal neuro deficit and follows commands  
appropriately; no asterixis noted 
Extremities: No edema; no clubbing or cyanosis noted 
 
Assessment
* Decompensated alcoholic liver cirrhosis- child sprague class B with score of 
approx. 12
* Hepatorenal syndrome
* Hyponatremia
* Hypomagnesemia
* Borderline macrocytic anemia with unknown etiology, possibly secondary to 
esophageal varices/duodenitis
* Borderline hypotension
Plan
* Proceed with diagnostic tap today
* Transfuse 1 unit packed cells slowly
* GI consult; patient likely may need upper endoscopy to rule out variceal bleed
although patient does not report any hematemesis or melena
* Avoid narcotics
* Monitor BP closely
* Patient may need volume expansion with albumin if pressure remains too low
 
 
 
 
 Current Medications
 
 
  Sig/Reagan Start time  Last
 
Medication Dose Route Stop Time Status Admin
 
Folic Acid 1 MG DAILY 05/24 0900 AC 05/24
 
  PO   0857
 
Lactulose 15 GM DAILY PRN 05/24 0545 AC 
 
  PO   
 
Magnesium Sulfate 1 GM ONCE ONE 05/24 0115 DC 05/24
 
Dextrose/Water 100 ML IV 05/24 0514  0300
 
Midodrine 5 MG 0800,1200,1600 05/24 0800  05/24
 
  PO   1150
 
Morphine Sulfate 0 .STK-MED ONE 05/23 2224 DC 
 
  .ROUTE   
 
Morphine Sulfate 4 MG ONCE ONE 05/23 2200 DC 05/23
 
  IV 05/23 2201  2234
 
Nicotine 14 MG DAILY AS NEEDED PRN 05/24 0145  05/24
 
  TOP   0413
 
Oxycodone HCl 5 MG Q8P PRN 05/24 0145 AC 05/24
 
  PO   1033
 
Pantoprazole Sodium 40 MG DAILY 05/24 0145 AC 05/24
 
  IV   0300
 
Pentoxifylline 400 MG TID 05/24 0900 AC 05/24
 
  PO   0857
 
Thiamine HCl 100 MG DAILY 05/24 0900 AC 05/24
 
  PO   0857
 
 
Laboratory Tests
 
 
 
05/24/18 0725:
Anion Gap 10, Estimated GFR 57  L, BUN/Creatinine Ratio 36.4  H, Magnesium 1.8, 
Total Bilirubin 8.2  H, Direct Bilirubin 4.9  H, AST 63  H, ALT 25, Alkaline 
Phosphatase 145  H, Total Protein 6.0  L, Albumin 2.7  L, CBC w Diff NO MAN DIFF
REQ, RBC 1.89  L, .8  H, MCH 35.1  H, MCHC 34.9, RDW 19.0  H, MPV 7.9, 
Gran % 69.3, Lymphocytes % 16.0  L, Monocytes % 13.6  H, Eosinophils % 0.8, 
Basophils % 0.3, Absolute Granulocytes 7.9  H, Absolute Lymphocytes 1.8, 
Absolute Monocytes 1.5  H, Absolute Eosinophils 0.1, Absolute Basophils 0
 
05/24/18 0015:
Urinalysis MOD  H, Urine Color ORANG  H, Urine Clarity HAZY  H, Urine pH 6.0, Ur
Specific Gravity 1.010, Urine Protein NEG, Urine Ketones NEG, Urine Nitrite POS 
H, Urine Bilirubin POS@ICTO  H, Urine Urobilinogen 1.0, Ur Leukocyte Esterase 
TRACE  H, Ur Microscopic SEDIMENT EXAMINED, Urine RBC 1-3, Urine WBC 3-5  H, Ur 
Epithelial Cells MOD  H, Urine Bacteria FEW  H, Urine Mucus FEW, Urine 
Hemoglobin NEG, Urine Glucose NEG
 
05/24/18 0015:
Urine Osmolality 417, Ur Random Creatinine 177.4, Ur Random Sodium < 5  L, Ur 
Random Potassium 15.6, Fraction Sodium Excret   
 
05/23/18 2220:
Anion Gap 11, Estimated GFR 57  L, BUN/Creatinine Ratio 38.2  H, Glucose 118  H,
Serum Osmolality 271  L, Calcium 9.0, Magnesium 1.5  L, Total Bilirubin 8.0  H, 
Direct Bilirubin 4.9  H, AST 68  H, ALT 21, Alkaline Phosphatase 159  H, Ammonia
< 9  L, Total Protein 6.5, Albumin 3.2  L, Globulin 3.3, Albumin/Globulin Ratio 
1.0  L, Lipase 1151  H, PT 18.1  H, INR 1.65  H, APTT 34, CBC w Diff NO MAN DIFF
REQ, RBC 2.02  L, .0  H, MCH 35.0  H, MCHC 34.7, RDW 19.0  H, MPV 8.3, 
Gran % 73.9, Lymphocytes % 12.5  L, Monocytes % 12.3  H, Eosinophils % 0.9, 
Basophils % 0.4, Absolute Granulocytes 9.4  H, Absolute Lymphocytes 1.6, 
Absolute Monocytes 1.6  H, Absolute Eosinophils 0.1, Absolute Basophils 0
 Microbiology
05/24 1124  BODY FLUID: Body Fluid Culture - ORD
05/24 1124  BODY FLUID: Gram Stain - ORD
 
 Vital Signs
 
 
Date Time Temp Pulse Resp B/P B/P Pulse O2 O2 Flow FiO2
 
     Mean Ox Delivery Rate 
 
05/24 0802    96/50     
 
05/24 0647 99.0 108 18 98/46  96   
 
05/24 0204 98.1 104 18 102/48  99   
 
05/24 0123 98.3 98 18 104/58  97 Room Air  
 
05/23 2044 98.0 123 18 127/79  98 Room Air  
 
 
 Intake & Output
 
 
 05/24 1600 05/24 0800 05/24 0000
 
Intake Total  100 0
 
Output Total   
 
Balance  100 0
 
    
 
Intake, IV  100 
 
Intake, Oral   0
 
Patient 110 lb 110 lb 120 lb
 
Weight   
 
Weight Bed scale Bed scale Reported by Patient
 
Measurement   
 
Method

## 2018-05-24 NOTE — ULTRASOUND REPORT
CLINICAL HISTORY:
This patient is a 32 years old Female with ascites found on physical exam,
who is referred to Interventional Radiology for ultrasound-guided diagnostic
paracentesis to evaluate for SBP.
 
PROCEDURE:
Ultrasound-guided paracentesis.
 
PHYSICIANS:
Dr. Micky Camp
 
MEDICATIONS:
10 mL of 1% lidocaine SQ.
 
COMPLICATIONS: None
ESTIMATED BLOOD LOSS: <5 mL
SPECIMENS: Samples were sent for analysis as requested.
IMPLANT: None.
 
SITE MARKING:
As part of the preprocedure verification policy, a site marking procedure was
initiated. Due to the nature the procedure, the insertion site could not be
predetermined thus invoking the policy of exemption to site laterality and
marking. Insertion site marking was performed in the procedure room in
conjunction with imaging confirmation.
 
PROCEDURE NOTE:
Informed consent was obtained from the patient prior to the procedure. During
this process, the procedure and potential alternatives were explained along
with the intended outcome and benefits. The risks of the procedure, including
the possibility of an unsuccessful procedure, as well as the risk of not
doing the procedure, were discussed. The patient was given the opportunity to
ask questions regarding the procedure and appeared competent to make
decisions. A signed consent form documenting this discussion was placed in
the medical record. A time-out procedure was performed.
 
Appropriate preprocedure medical history and imaging studies were reviewed.
The patient was brought to the ultrasound room and placed in the supine
position. A time-out procedure was performed. Ultrasound images of the
abdomen were obtained to localize a large collection of ascites. Images were
permanently saved to the record.
 
An area of the right lower quadrant was prepped and draped in the standard
sterile fashion. All elements of maximal sterile barrier technique followed
including use of cap, mask, sterile gown, sterile gloves, a sterile full body
drape and hand hygiene. Also followed skin preparation with 2% chlorhexidine
for cutaneous antisepsis, and sterile ultrasound preparation with sterile gel
and probe cover when applicable. 10 mL of 1% lidocaine was used to obtain
local anesthesia of the skin and deeper tissues. A standard small-bore needle
was introduced to sample fluid and demonstrated a safe access route. There
was no evidence of traversing adjacent organs or vascular structures. A 5
Russian Yueh needle/catheter was utilized for access.  60 mL of clear yellow
fluid was aspirated. The catheter was removed and sterile dressing applied.
 
The patient tolerated the procedure well without evidence of complications.
 
FINDINGS:
Large simple abdominal ascites as detailed above.
 
IMPRESSION:
Successful ultrasound-guided diagnostic paracentesis.
 
PLAN:
The patient was stable after the procedure. The patient will be transferred
to the floor.

## 2018-05-25 VITALS — SYSTOLIC BLOOD PRESSURE: 101 MMHG | DIASTOLIC BLOOD PRESSURE: 51 MMHG

## 2018-05-25 VITALS — DIASTOLIC BLOOD PRESSURE: 51 MMHG | SYSTOLIC BLOOD PRESSURE: 101 MMHG

## 2018-05-25 VITALS — DIASTOLIC BLOOD PRESSURE: 51 MMHG | SYSTOLIC BLOOD PRESSURE: 10 MMHG

## 2018-05-25 VITALS — SYSTOLIC BLOOD PRESSURE: 110 MMHG | DIASTOLIC BLOOD PRESSURE: 70 MMHG

## 2018-05-25 VITALS — DIASTOLIC BLOOD PRESSURE: 62 MMHG | SYSTOLIC BLOOD PRESSURE: 110 MMHG

## 2018-05-25 VITALS — SYSTOLIC BLOOD PRESSURE: 108 MMHG | DIASTOLIC BLOOD PRESSURE: 60 MMHG

## 2018-05-25 VITALS — DIASTOLIC BLOOD PRESSURE: 70 MMHG | SYSTOLIC BLOOD PRESSURE: 118 MMHG

## 2018-05-25 LAB
ABSOLUTE GRANULOCYTE CT: 6.9 /CUMM (ref 1.4–6.5)
ABSOLUTE GRANULOCYTE CT: 7.7 /CUMM (ref 1.4–6.5)
BASOPHILS # BLD: 0 /CUMM (ref 0–0.2)
BASOPHILS # BLD: 0.1 /CUMM (ref 0–0.2)
BASOPHILS NFR BLD: 0 % (ref 0–2)
BASOPHILS NFR BLD: 0.5 % (ref 0–2)
EOSINOPHIL # BLD: 0 /CUMM (ref 0–0.7)
EOSINOPHIL # BLD: 0.1 /CUMM (ref 0–0.7)
EOSINOPHIL NFR BLD: 0.1 % (ref 0–5)
EOSINOPHIL NFR BLD: 0.7 % (ref 0–5)
ERYTHROCYTE [DISTWIDTH] IN BLOOD BY AUTOMATED COUNT: 19.5 % (ref 11.5–14.5)
ERYTHROCYTE [DISTWIDTH] IN BLOOD BY AUTOMATED COUNT: 21 % (ref 11.5–14.5)
GRANULOCYTES NFR BLD: 67.3 % (ref 42.2–75.2)
GRANULOCYTES NFR BLD: 90.2 % (ref 42.2–75.2)
HCT VFR BLD CALC: 18.5 % (ref 37–47)
HCT VFR BLD CALC: 21.7 % (ref 37–47)
LYMPHOCYTES # BLD: 0.5 /CUMM (ref 1.2–3.4)
LYMPHOCYTES # BLD: 2.1 /CUMM (ref 1.2–3.4)
MCH RBC QN AUTO: 33.8 PG (ref 27–31)
MCH RBC QN AUTO: 36 PG (ref 27–31)
MCHC RBC AUTO-ENTMCNC: 34.4 G/DL (ref 33–37)
MCHC RBC AUTO-ENTMCNC: 35.4 G/DL (ref 33–37)
MCV RBC AUTO: 101.7 FL (ref 81–99)
MCV RBC AUTO: 98.2 FL (ref 81–99)
MONOCYTES # BLD: 0.2 /CUMM (ref 0.1–0.6)
MONOCYTES # BLD: 1.5 /CUMM (ref 0.1–0.6)
PLATELET # BLD: 116 /CUMM (ref 130–400)
PLATELET # BLD: 148 /CUMM (ref 130–400)
PMV BLD AUTO: 7.8 FL (ref 7.4–10.4)
PMV BLD AUTO: 8 FL (ref 7.4–10.4)
PROTHROMBIN TIME: 18.2 SEC (ref 9.4–12.5)
RED BLOOD CELL CT: 1.82 /CUMM (ref 4.2–5.4)
RED BLOOD CELL CT: 2.21 /CUMM (ref 4.2–5.4)
WBC # BLD AUTO: 11.5 /CUMM (ref 4.8–10.8)
WBC # BLD AUTO: 7.6 /CUMM (ref 4.8–10.8)

## 2018-05-25 NOTE — PN- ATT ADDEND
Attending Addendum
Attending Brief Note
Patient seen and examined.  Plan of care discussed with the medical team and the
patient.  Available lab work and radiology test reports were reviewed.  Patient 
is lying in bed comfortably and denies any acute pain fever chills difficulty 
breathing.  He denies any nausea vomiting.  Yesterday an attempt to give her 
blood and surgery was not successful.  Patient suffered the possibly 
extravasation of blood in the subtenons tissue she has a large ecchymosis in the
right arm today.  
 
Exam:
General: Patient awake slightly lethargic but oriented without any distress ; 
patient is emeciated
CVS: S1 plus S2 without any murmur or gallops 
Chest: Few scattered crepitation without any wheeze.  There is no respiratory 
distress. 
Abdomen: Soft and distended non-tender, bowel sound present, no guarding or 
rebound; signs of ascites are noted with prominent veins 
CNS: Awake  oriented without any focal neuro deficit and follows commands  
appropriately; no asterixis noted; 
Extremities: No edema; no clubbing or cyanosis noted 
 
Assessment
* Decompensated alcoholic liver cirrhosis- child sprague class B with score of 
approx. 12
* Suspected Hepatorenal syndrome
* Hyponatremia
* Hypomagnesemia
* Borderline macrocytic anemia with unknown etiology, possibly secondary to 
esophageal varices/duodenitis
* Borderline hypotension
* Severe anemia
* Right arm ecchymosis due to extravasation of blood during transfusion
* History of alcohol abuse currently sober- pentoxifylline stopped
Plan
* Transfuse 1 unit packed cells slowly; okay to use leg veins
* Avoid narcotics
* Monitor BP closely
* Patient may need volume expansion with albumin if pressure remains too low; 
continue albumin as recommended by GI
 
 Current Medications
 
 
  Sig/Reagan Start time  Last
 
Medication Dose Route Stop Time Status Admin
 
Albumin Human 12.5 GM Q48 05/26 0900 DC 
 
  IV   
 
Albumin Human 12.5 GM Q8 05/25 0815 AC 05/25
 
  IV 05/26 2355  0815
 
Albumin Human 12.5 GM ONCE ONE 05/24 1500 DC 05/24
 
  IV 05/24 1501  1754
 
Ferrous Sulfate 325 MG BID 05/24 2100 AC 05/25
 
  PO   0956
 
Folic Acid 1 MG DAILY 05/24 0900 AC 05/25
 
  PO   0956
 
Lactulose 20 GM BID 05/25 0900 AC 05/25
 
  PO   0955
 
Lactulose 15 GM DAILY PRN 05/24 0545 DC 
 
  PO   
 
Lidocaine 20 ML .STK-MED ONE 05/24 1552 DC 
 
  ID 05/24 1553  
 
Midodrine 5 MG 0800,1200,1600 05/24 0800 [place] 05/24
 
  PO   1753
 
Nicotine 14 MG DAILY AS NEEDED PRN 05/24 0145  05/25
 
  TOP   0034
 
Oxycodone HCl 5 MG Q8P PRN 05/24 0145  05/25
 
  PO   0022
 
Pantoprazole Sodium 40 MG DAILY 05/24 0145  05/25
 
  IV   0957
 
Patient Medication  1 ED ONE ONE 05/25 0900 [place] 05/25
 
[facility]  ED 05/25 0901  0957
 
Patient Medication  1 ED ONE ONE 05/24 1545 [facility] 05/24 1546  
 
Pentoxifylline 400 MG TID 05/24 0900 [place] 05/24
 
  PO   1421
 
Thiamine HCl 100 MG DAILY 05/24 0900  05/25
 
  PO   0956
 
 
Laboratory Tests
 
 
 
05/25/18 0615:
Anion Gap 8, Estimated GFR 57  L, BUN/Creatinine Ratio 36.4  H, Total Bilirubin 
8.3  H, Direct Bilirubin 4.7  H, AST 60  H, ALT 27, Alkaline Phosphatase 140  H,
Total Protein 6.0  L, Albumin 2.8  L, PT 18.2  H, INR 1.66  H, CBC w Diff NO MAN
DIFF REQ, RBC 1.82  L, .7  H, MCH 36.0  H, MCHC 35.4, RDW 19.5  H, MPV 
8.0, Gran % 67.3, Lymphocytes % 18.6  L, Monocytes % 12.9  H, Eosinophils % 0.7,
Basophils % 0.5, Absolute Granulocytes 7.7  H, Absolute Lymphocytes 2.1, 
Absolute Monocytes 1.5  H, Absolute Eosinophils 0.1, Absolute Basophils 0.1
 
05/24/18 2010:
CBC w Diff NO MAN DIFF REQ, RBC 1.78  L, .6  H, MCH 35.9  H, MCHC 35.3, 
RDW 19.2  H, MPV 7.6, Gran % 66.6, Lymphocytes % 16.2  L, Monocytes % 15.6  H, 
Eosinophils % 1.3, Basophils % 0.3, Absolute Granulocytes 7.6  H, Absolute 
Lymphocytes 1.8, Absolute Monocytes 1.8  H, Absolute Eosinophils 0.1, Absolute 
Basophils 0
 
05/24/18 1124:
Fluid Total Protein Cancelled, Fluid Triglycerides Cancelled
 
05/24/18 1000:
Fluid   H, Fld Total RBCs Counted 48  H
 
05/24/18 1000:
Lymphocytes 32, % Normal PMNs 1, Misc Hematology Test   , Fluid Glucose 99, 
Fluid Total Protein 2.4, Fluid Albumin 1.1, Fluid , Fluid Amylase 120, 
Fluid Triglycerides 20
 
05/24/18 0725:
Anion Gap 10, Estimated GFR 57  L, BUN/Creatinine Ratio 36.4  H, Magnesium 1.8, 
Total Bilirubin 8.2  H, Direct Bilirubin 4.9  H, AST 63  H, ALT 25, Alkaline 
Phosphatase 145  H, Total Protein 6.0  L, Albumin 2.7  L, CBC w Diff NO MAN DIFF
REQ, RBC 1.89  L, .8  H, MCH 35.1  H, MCHC 34.9, RDW 19.0  H, MPV 7.9, 
Gran % 69.3, Lymphocytes % 16.0  L, Monocytes % 13.6  H, Eosinophils % 0.8, 
Basophils % 0.3, Absolute Granulocytes 7.9  H, Absolute Lymphocytes 1.8, 
Absolute Monocytes 1.5  H, Absolute Eosinophils 0.1, Absolute Basophils 0
 
05/24/18 0015:
Urinalysis MOD  H, Urine Color ORANG  H, Urine Clarity HAZY  H, Urine pH 6.0, Ur
Specific Gravity 1.010, Urine Protein NEG, Urine Ketones NEG, Urine Nitrite POS 
H, Urine Bilirubin POS@ICTO  H, Urine Urobilinogen 1.0, Ur Leukocyte Esterase 
TRACE  H, Ur Microscopic SEDIMENT EXAMINED, Urine RBC 1-3, Urine WBC 3-5  H, Ur 
Epithelial Cells MOD  H, Urine Bacteria FEW  H, Urine Mucus FEW, Urine 
Hemoglobin NEG, Urine Glucose NEG
 
05/24/18 0015:
Urine Osmolality 417, Ur Random Creatinine 177.4, Ur Random Sodium < 5  L, Ur 
Random Potassium 15.6, Fraction Sodium Excret   
 
05/23/18 2220:
Anion Gap 11, Estimated GFR 57  L, BUN/Creatinine Ratio 38.2  H, Glucose 118  H,
Serum Osmolality 271  L, Calcium 9.0, Magnesium 1.5  L, Total Bilirubin 8.0  H, 
Direct Bilirubin 4.9  H, AST 68  H, ALT 21, Alkaline Phosphatase 159  H, Ammonia
< 9  L, Total Protein 6.5, Albumin 3.2  L, Globulin 3.3, Albumin/Globulin Ratio 
1.0  L, Lipase 1151  H, PT 18.1  H, INR 1.65  H, APTT 34, CBC w Diff NO MAN DIFF
REQ, RBC 2.02  L, .0  H, MCH 35.0  H, MCHC 34.7, RDW 19.0  H, MPV 8.3, 
Gran % 73.9, Lymphocytes % 12.5  L, Monocytes % 12.3  H, Eosinophils % 0.9, 
Basophils % 0.4, Absolute Granulocytes 9.4  H, Absolute Lymphocytes 1.6, 
Absolute Monocytes 1.6  H, Absolute Eosinophils 0.1, Absolute Basophils 0
 Microbiology
05/24 1124  BODY FLUID: Body Fluid Culture - RES
05/24 1124  BODY FLUID: Gram Stain - RES
 
 Vital Signs
 
 
Date Time Temp Pulse Resp B/P B/P Pulse O2 O2 Flow FiO2
 
     Mean Ox Delivery Rate 
 
05/25 0731 98.4 98 18 101/51  98 Room Air  
 
05/24 2257 98.9 95 18 100/52  98 Room Air  
 
05/24 1411 98.3 104 18 102/68  98 Room Air  
 
05/24 1305 98.3 104 18 102/68  98 Room Air  
 
05/24 1100 98.3 100 20 104/60  97 Room Air  
 
 
 Intake & Output
 
 
 05/25 1600 05/25 0800 05/25 0000
 
Intake Total  240 360
 
Output Total   
 
Balance  240 360
 
    
 
Intake, Oral  240 360
 
Number  1 
 
Bowel   
 
Movements

## 2018-05-25 NOTE — EVENT NOTE
Event Note
Event Note:
Spoke with IR regarding paracentesis. They suggest that it should be suggested 
over the long weekend if it is a life saving measure for the patient or if the 
patient is down-trending because of ascites. Dr. Dixon can be reached over the 
weekend at #[phone] 
 
*Please check with people who are certified with paracentesis before calling IR 
over the weekend.

## 2018-05-25 NOTE — PN- HOUSESTAFF
Subjective
Follow-up For:
 Decompensated alcoholic liver cirrhosis- child sprague class B with score of 
approx. 12
 Suspected Hepatorenal syndrome
 Hyponatremia
 Hypomagnesemia
 Borderline macrocytic anemia with unknown etiology, possibly secondary to 
esophageal varices/duodenitis
 Borderline hypotension
 Severe anemia
 Right arm ecchymosis due to extravasation of blood during transfusion
 History of alcohol abuse currently sober- pentoxifylline stopped
Subjective:
Patient notes no abdominal pain, abdomen is no more distended.  Patient had 
diagnostic paracentesis yesterday.  The patient for the first time in a while 
notes that she is hungry.  The patient had a blood transfusion yesterday with 
suspected extravasation of the blood secondary to her coagulopathic the.  Today 
she will have a second unit put in through her right lower extremity.  The 
patient continues to have lower blood pressure but is higher than yesterday at 
102/68.  Patient denies any chest pain or shortness of breath.
 
Review of Systems
Constitutional:
Reports: malaise, weakness. 
EENTM:
Reports: icterus. 
Cardiovascular:
Reports: no symptoms. 
Respiratory:
Reports: no symptoms. 
Gastrointestinal:
Reports: distention. 
Genitourinary:
Reports: no symptoms. 
Musculoskeletal:
Reports: no symptoms. 
Skin:
Reports: change in skin color. 
Neurological/Psychological:
Reports: no symptoms. 
Hematologic/Endocrine:
Reports: bruising. 
 
Objective
Last 24 Hrs of Vital Signs/I&O
 Vital Signs
 
 
Date Time Temp Pulse Resp B/P B/P Pulse O2 O2 Flow FiO2
 
     Mean Ox Delivery Rate 
 
 1200 98.8 104 20 108/60     
 
 1000 98.4 98 18 101/51     
 
 0800 98.4 98 20 010/51     
 
 0731 98.4 98 18 101/51  98 Room Air  
 
 2257 98.9 95 18 100/52  98 Room Air  
 
 
 Intake & Output
 
 
  1600  0800  0000
 
Intake Total 720 240 360
 
Output Total 250  
 
Balance 470 240 360
 
    
 
Intake,   
 
Intake, Oral 350 240 360
 
Number 1 1 
 
Bowel   
 
Movements   
 
Output, Urine 250  
 
Patient 110 lb  
 
Weight   
 
 
 
 
Physical Exam
General Appearance: Alert, Oriented X3, Cooperative, No Acute Distress
Skin: No Rashes, No Breakdown, patient has right-sided large hematoma almost 
full arm where her blood was given yesterday.  Patient has icterus and jaundice 
widespread.
Skin Temp/Moisture Exam: Warm/Dry
Sepsis Skin Exam (color): Normal for Ethnicity
HEENT: Atraumatic, PERRLA, EOMI
Neck: Supple, No JVD
Cardiovascular: Regular Rate, Normal S1, Normal S2
Lungs: Clear to Auscultation, Normal Air Movement
Abdomen: Normal Bowel Sounds, No Tenderness, No Masses
Neurological: Normal Speech
Extremities: No Clubbing, No Cyanosis, Normal Pulses, No Tenderness/Swelling
Vascular: Normal Pulses, Pulses Symmetrical
Current Medications:
 Current Medications
 
 
  Sig/Reagan Start time  Last
 
Medication Dose Route Stop Time Status Admin
 
Albumin Human 12.5 GM Q48  09 DC 
 
  IV   
 
Albumin Human 12.5 GM Q8  0815  
 
  IV  2355  1305
 
Diphenhydramine HCl 25 MG ONCE ONE  1130 DC 
 
  IV  1131  1240
 
Ferrous Sulfate 325 MG BID  2100  
 
  PO   0956
 
Folic Acid 1 MG DAILY  09  
 
  PO   0956
 
Lactulose 20 GM BID  0900  
 
  PO   0955
 
Lactulose 15 GM DAILY PRN  0545 [place] 
 
  PO   
 
Methylprednisolone 50 MG ONCE ONE  1130 DC 
 
  IV  1131  1240
 
Midodrine 5 MG 0800,1200,1600  0800 [place] 
 
  PO   1753
 
Nicotine 14 MG DAILY AS NEEDED PRN  014  
 
  TOP   0034
 
Oxycodone HCl 5 MG Q8P PRN  0145  
 
  PO   1454
 
Pantoprazole Sodium 40 MG DAILY  0145  
 
  IV   0957
 
Patient Medication  1 ED ONE ONE  09 ND 
 
Teaching  ED  0901  0957
 
Pentoxifylline 400 MG TID  09 ND 
 
  PO   1421
 
Thiamine HCl 100 MG DAILY  09  
 
  PO   0956
 
 
 
 
Last 24 Hrs of Lab/Raghav Results
Last 24 Hrs of Labs/Mics:
 Laboratory Tests
 
18 0615:
Anion Gap 8, Estimated GFR 57  L, BUN/Creatinine Ratio 36.4  H, Total Bilirubin 
8.3  H, Direct Bilirubin 4.7  H, AST 60  H, ALT 27, Alkaline Phosphatase 140  H,
Total Protein 6.0  L, Albumin 2.8  L, PT 18.2  H, INR 1.66  H, CBC w Diff NO MAN
DIFF REQ, RBC 1.82  L, .7  H, MCH 36.0  H, MCHC 35.4, RDW 19.5  H, MPV 
8.0, Gran % 67.3, Lymphocytes % 18.6  L, Monocytes % 12.9  H, Eosinophils % 0.7,
Basophils % 0.5, Absolute Granulocytes 7.7  H, Absolute Lymphocytes 2.1, 
Absolute Monocytes 1.5  H, Absolute Eosinophils 0.1, Absolute Basophils 0.1
 
18:
CBC w Diff NO MAN DIFF REQ, RBC 1.78  L, .6  H, MCH 35.9  H, MCHC 35.3, 
RDW 19.2  H, MPV 7.6, Gran % 66.6, Lymphocytes % 16.2  L, Monocytes % 15.6  H, 
Eosinophils % 1.3, Basophils % 0.3, Absolute Granulocytes 7.6  H, Absolute 
Lymphocytes 1.8, Absolute Monocytes 1.8  H, Absolute Eosinophils 0.1, Absolute 
Basophils 0
 
 
Assessment/Plan
Assessment:
Ms. Sanchez is a 33yo F w/ PMH of alcohol dependence, alcohol liver cirrhosis/
failure with hepatorenal syndrome, from home presented to ER with numerous 
complaints including medication r refill of oxycodone, abdominal/back pain. 
Patient was recent discharge from [facility] on 2018, for treatment of
hyponatremia with fluid restriction, hepatorenal syndrome with IV albumin 
infusion/midodrine/octreotide/pentoxifylline 400 mg p.o. 3 times daily 4 weeks 
starting on 2018.  Patient underwent EGD 2018, and was diagnosed with 
stage I esophageal varices, however not a candidate of beta-blocker due to 
hepatorenal syndrome.  Patient was also treated with acute blood loss was 2 
units of PRBC on 2018.
 
On admission,
Vitals: Afebrile, tachycardia 123, RR 18, /79, 98% on room air
 
-CBC: Mild leukocytosis 12.7, microcytic anemia 7.1/20.4 (baseline 10.1 on ).
-BMP: Hyponatremia 126, K4.2, creatinine 1.1 (baseline 0.1 in 2018).  
Hypomagnesemia 1.5, elevated alk phos 159, albumin 3.2, lipase 1151
-UA/Microbiology:
-Misc: Paracentesis cytology showed no identification of malignancy.
-Ab CT: ascites, however no pancreatitis
 
 
Problem list & Assessment: 
#Decompensated alcoholic liver cirrhosis
#Hepatorenal syndrome
#Hyponatremia
#Hypomagnesemia
#Borderline macrocytic anemia with unknown etiology, possibly secondary to 
esophageal varices/duodenitis, hemoglobin below 7
 
 
Hospital Course:
-Admitted to general medicine floor

-diagnostic para showed SAAG over 1.1 indicative of portal hypertension and no 
SBP.  We will go ahead with therapeutic tap today.
-follow GI recommendations.
-continue lacctulose 
-continues to have low BP.  we have started 12.5mg albumin to expand 
intravascular volume.
-Patient did have extravasation of first unit of blood secondary to her 
coagulopathy.  we will try a different vein in the foot for her second unit 
today. We premedicated with solumedrol and benadryl just in case transfusion 
reaction was to blame.  No evidence of transfusion reaction. cbc 8pm tonight.

abdominal pain was most likely due to the increased body weight, ascites causing
chronic back pain/muscular strain, was negative physical examination findings on
spine/imaging/strength or neurological deficits.



 
 
DVT prophylaxis ALPS
Clear liquid diet was 1000 cc fluid restriction
Full Code
Problem List:
 1. Alcoholic cirrhosis of liver with ascites
 
 2. Symptomatic anemia
 
 3. Esophageal varices determined by endoscopy
 
Pain Ratin
Pain Location:
na
Pain Goal: Remain pain free
Pain Plan:
prn
Tomorrow's Labs & Rationales:
cbc bep
lft

## 2018-05-26 VITALS — DIASTOLIC BLOOD PRESSURE: 52 MMHG | SYSTOLIC BLOOD PRESSURE: 100 MMHG

## 2018-05-26 VITALS — DIASTOLIC BLOOD PRESSURE: 60 MMHG | SYSTOLIC BLOOD PRESSURE: 114 MMHG

## 2018-05-26 VITALS — SYSTOLIC BLOOD PRESSURE: 100 MMHG | DIASTOLIC BLOOD PRESSURE: 52 MMHG

## 2018-05-26 VITALS — SYSTOLIC BLOOD PRESSURE: 116 MMHG | DIASTOLIC BLOOD PRESSURE: 68 MMHG

## 2018-05-26 VITALS — DIASTOLIC BLOOD PRESSURE: 62 MMHG | SYSTOLIC BLOOD PRESSURE: 124 MMHG

## 2018-05-26 LAB
ABSOLUTE GRANULOCYTE CT: 8.8 /CUMM (ref 1.4–6.5)
BASOPHILS # BLD: 0 /CUMM (ref 0–0.2)
BASOPHILS NFR BLD: 0.1 % (ref 0–2)
EOSINOPHIL # BLD: 0 /CUMM (ref 0–0.7)
EOSINOPHIL NFR BLD: 0 % (ref 0–5)
ERYTHROCYTE [DISTWIDTH] IN BLOOD BY AUTOMATED COUNT: 21.1 % (ref 11.5–14.5)
GRANULOCYTES NFR BLD: 72.6 % (ref 42.2–75.2)
HCT VFR BLD CALC: 20.7 % (ref 37–47)
LYMPHOCYTES # BLD: 1.3 /CUMM (ref 1.2–3.4)
MCH RBC QN AUTO: 34.1 PG (ref 27–31)
MCHC RBC AUTO-ENTMCNC: 34.1 G/DL (ref 33–37)
MCV RBC AUTO: 100.2 FL (ref 81–99)
MONOCYTES # BLD: 2 /CUMM (ref 0.1–0.6)
PLATELET # BLD: 116 /CUMM (ref 130–400)
PMV BLD AUTO: 8.4 FL (ref 7.4–10.4)
RED BLOOD CELL CT: 2.06 /CUMM (ref 4.2–5.4)
WBC # BLD AUTO: 12.2 /CUMM (ref 4.8–10.8)

## 2018-05-26 NOTE — PN- HOUSESTAFF
Subjective
Follow-up For:
Decompensated alcoholic liver cirrhosis- child sprague class B with score of 
approx. 12
 Suspected Hepatorenal syndrome
 Hyponatremia
 Hypomagnesemia
 Borderline macrocytic anemia with unknown etiology, possibly secondary to 
esophageal varices/duodenitis
 Borderline hypotension
 Severe anemia
 Right arm ecchymosis due to extravasation of blood during transfusion
 History of alcohol abuse currently sober- pentoxifylline stopped
Subjective:
Patient seen and examined at bedside.  No overnight events.  Denies abdominal 
pain, abdominal discomfort.
 
Review of Systems
Constitutional:
Reports: no symptoms, see HPI. 
 
Objective
Last 24 Hrs of Vital Signs/I&O
 Vital Signs
 
 
Date Time Temp Pulse Resp B/P B/P Pulse O2 O2 Flow FiO2
 
     Mean Ox Delivery Rate 
 
618 97.8 88 16 114/60  97 Room Air  
 
 2106 98.8 100 16 118/70  98 Room Air  
 
 1800 992.0 105 20 110/62     
 
 1600 98.8 99 20 110/70     
 
 1600 98.8 99 20 110/70  100 Room Air  
 
 1400 98.8 99 20 110/70     
 
 1200 98.8 104 20 108/60     
 
 
 Intake & Output
 
 
  1600  0800  0000
 
Intake Total  60 
 
Output Total   
 
Balance  60 
 
    
 
Intake, Oral  60 
 
Number   1
 
Bowel   
 
Movements   
 
Patient  117 lb 
 
Weight   
 
Weight  Bed scale 
 
Measurement   
 
Method   
 
 
 
 
Physical Exam
General Appearance: Alert, Oriented X3, Cooperative
Cardiovascular: Normal S1, Normal S2
Lungs: Clear to Auscultation
Abdomen:  distended, normal bowel sounds
Current Medications:
 Current Medications
 
 
  Sig/Reagan Start time  Last
 
Medication Dose Route Stop Time Status Admin
 
Albumin Human 12.5 GM Q8  0815  
 
  IV  2355  0510
 
Diphenhydramine HCl 25 MG ONCE ONE  1130 DC 
 
  IV  1131  1240
 
Ferrous Sulfate 325 MG BID  2100 
 
  PO   0804
 
Folic Acid 1 MG DAILY  09
 
  PO   0804
 
Lactulose 20 GM BID  09
 
  PO   0804
 
Methylprednisolone 50 MG ONCE ONE  1130 DC 
 
  IV  1131  1240
 
Nicotine 14 MG DAILY AS NEEDED PRN  0145 
 
  TOP   0051
 
Oxycodone HCl 5 MG Q8P PRN  0145 AC 
 
  PO   0051
 
Pantoprazole Sodium 40 MG DAILY  0145 AC 
 
  IV   0804
 
Thiamine HCl 100 MG DAILY  0900 AC 
 
  PO   0804
 
 
 
 
Last 24 Hrs of Lab/Raghav Results
Last 24 Hrs of Labs/Mics:
 Laboratory Tests
 
18 1055:
Sodium Pending, Potassium Pending, Chloride Pending, Carbon Dioxide Pending, 
Anion Gap Pending, BUN Pending, Creatinine Pending, BUN/Creatinine Ratio Pending
, CBC w Diff Pending, WBC Pending, RBC Pending, Hgb Pending, Hct Pending, MCV 
Pending, MCH Pending, MCHC Pending, RDW Pending, Plt Count Pending, MPV Pending
 
18:
CBC w Diff NO MAN DIFF REQ, RBC 2.21  L, MCV 98.2, MCH 33.8  H, MCHC 34.4, RDW 
21.0  H, MPV 7.8, Gran % 90.2  H, Lymphocytes % 6.9  L, Monocytes % 2.8, 
Eosinophils % 0.1, Basophils % 0, Absolute Granulocytes 6.9  H, Absolute 
Lymphocytes 0.5  L, Absolute Monocytes 0.2, Absolute Eosinophils 0, Absolute 
Basophils 0
 
18:
Ur Random Creatinine 99.4, Ur Random Sodium < 5  L, Ur Random Potassium 18.7, 
Fraction Sodium Excret 0.0
 
 
Assessment/Plan
Assessment:
Ms. Sanchez is a 31yo F w/ PMH of alcohol dependence, alcohol liver cirrhosis/
failure with hepatorenal syndrome, from home presented to ER with numerous 
complaints including medication r refill of oxycodone, abdominal/back pain. 
Patient was recent discharge from [facility] on 2018, for treatment of
hyponatremia with fluid restriction, hepatorenal syndrome with IV albumin 
infusion/midodrine/octreotide/pentoxifylline 400 mg p.o. 3 times daily 4 weeks 
starting on 2018.  Patient underwent EGD 2018, and was diagnosed with 
stage I esophageal varices, however not a candidate of beta-blocker due to 
hepatorenal syndrome.  Patient was also treated with acute blood loss was 2 
units of PRBC on 2018.
 
On admission,
Vitals: Afebrile, tachycardia 123, RR 18, /79, 98% on room air
 
-CBC: Mild leukocytosis 12.7, microcytic anemia 7.1/20.4 (baseline 10.1 on ).
-BMP: Hyponatremia 126, K4.2, creatinine 1.1 (baseline 0.1 in 2018).  
Hypomagnesemia 1.5, elevated alk phos 159, albumin 3.2, lipase 1151
-UA/Microbiology:
-Misc: Paracentesis cytology showed no identification of malignancy.
-Ab CT: ascites, however no pancreatitis
 
 
Problem list & Assessment: 
#Decompensated alcoholic liver cirrhosis
#Hepatorenal syndrome
#Hyponatremia
#Hypomagnesemia
#Borderline macrocytic anemia with unknown etiology, possibly secondary to 
esophageal varices/duodenitis, hemoglobin below 7
 
 
Hospital Course:
 
-diagnostic para showed SAAG over 1.1 indicative of portal hypertension and no 
SBP.  Patient will have paracentesis on Tuesday.  Replete electrolytes as needed
-follow GI recommendations.
-continue lacctulose 
-Patient blood pressure today 114/60.  Patient is on albumin to expand the 
intravascular volume.  
-Patient did have extravasation of first unit of blood transfusion.  So far 
patient has got 2 units of blood transfusion.  Today CBC pending.

abdominal pain was most likely due to the increased body weight, ascites causing
chronic back pain/muscular strain, was negative physical examination findings on
spine/imaging/strength or neurological deficits.

 
 
 
DVT prophylaxis ALPS
Clear liquid diet was 1000 cc fluid restriction
Full Code
Problem List:
 1. Alcoholic cirrhosis of liver with ascites
 
Pain Ratin
Pain Location:
none
Pain Goal: Remain pain free
Pain Plan:
tylenol
Tomorrow's Labs & Rationales:
cbc,bep

## 2018-05-26 NOTE — PN- GASTROENTEROLOGY
Assessment/Plan GI
Assessment/Recommendations:
ASSESSMENT:
1.  Cirrhosis
2.  Presumed hepatorenal syndrome, type II
3.  Ascites
4.  Elevated white blood count unclear etiology
5.  Alcohol dependence in remission
 
RECOMMENDATIONS:
1.  Would repeat would repeat urine sodium.  If less than 10 would treat 
hepatorenal syndrome as follows
2.  Would add octreotide to regimen.  Would give 100 mg subcutaneous 3 times a 
day.  Would also increase albumin to 1 g/kg for 2 days as IV bolus followed by 
25-50 g per day as a bolus #3 would add major dream 7.5 mg every 8 hours 
increasing to 50 mg every 8 hours
3.  Given increase in white blood cell count would perform diagnostic tap of 
ascitic fluid (not more than 50 mL 4 culture and sensitivities, Gram stain, and 
cell count and differential).  Would also repeat UA C&S.
 
 
Subjective
Subjective:
Patient is feeling well.  Is eating a full diet and has a good appetite.  Has 
not had repeat paracentesis yet.
 
Objective
Vital Signs and I&Os
Vital Signs
 
 
Date Time Temp Pulse Resp B/P B/P Pulse O2 O2 Flow FiO2
 
     Mean Ox Delivery Rate 
 
05/26 1600 98.1 96 20 100/52     
 
05/26 1406 98.1 96 20 100/52  98 Room Air  
 
05/26 1400 98.1 96 20 100/52     
 
05/26 1200 97.2 90 18 124/62     
 
05/26 1000 97.8 88 16 114/60     
 
05/26 0800 98.7 88 16 114/60     
 
05/26 0618 97.8 88 16 114/60  97 Room Air  
 
05/25 2106 98.8 100 16 118/70  98 Room Air  
 
05/25 1800 992.0 105 20 110/62     
 
 
 Intake & Output
 
 
 05/26 1600 05/26 0400 05/25 1600 05/25 0400 05/24 1600 05/24 0400
 
Intake Total 470  960 360 940 0
 
Output Total 300  250  360 
 
Balance 170  710 360 580 0
 
       
 
Intake, Blood     350 
 
Product      
 
Intake, IV 50  370  110 
 
Intake, Oral 420  590 360 480 0
 
Number 1 1 2   
 
Bowel      
 
Movements      
 
Output, Other     60 
 
Output, Urine 300  250  300 
 
Patient 117 lb  110 lb  110 lb 110 lb
 
Weight      
 
Weight Bed scale    Bed scale Bed scale
 
Measurement      
 
Method      
 
 
 
 
Physical Exam
General Appearance: no apparent distress, alert, comfortable
Head: atraumatic
Respiratory: normal breath sounds, lungs clear
Cardiovascular: regular rate/rhythm, normal S1 and S2 without rub, murmur, or 
gallop
Abdomen: normal bowel sounds, distention, ascites is present
Extremities: no edema
Neurologic/Psychiatric: awake, alert, oriented x 3
Skin: jaundice
Current Medications:
 Current Medications
 
 
  Sig/Reagan Start time  Last
 
Medication Dose Route Stop Time Status Admin
 
Albumin Human 12.5 GM Q8 05/25 0815 AC 05/26
 
  IV 05/26 2265  1511
 
Ferrous Sulfate 325 MG BID 05/24 2100 AC 05/26
 
  PO   0804
 
Folic Acid 1 MG DAILY 05/24 0900 AC 05/26
 
  PO   0804
 
Lactulose 20 GM BID 05/25 0900 AC 05/26
 
  PO   0804
 
Nicotine 14 MG DAILY AS NEEDED PRN 05/24 0145 AC 05/26
 
  TOP   0051
 
Oxycodone HCl 5 MG Q8P PRN 05/24 0145 AC 05/26
 
  PO   1510
 
Pantoprazole Sodium 40 MG DAILY 05/24 0145 AC 05/26
 
  IV   0804
 
Thiamine HCl 100 MG DAILY 05/24 0900 AC 05/26
 
  PO   0804
 
 
 
 
Results
Pertinent Lab Results:
 Laboratory Tests
 
 
 05/26 05/25 1055 2015
 
Chemistry  
 
  Sodium (137 - 145 mmol/L) 128  L 
 
  Potassium (3.5 - 5.1 mmol/L) 4.4 
 
  Chloride (98 - 107 mmol/L) 90  L 
 
  Carbon Dioxide (22 - 30 mmol/L) 27 
 
  Anion Gap (5 - 16) 11 
 
  BUN (7 - 17 mg/dL) 44  H 
 
  Creatinine (0.5 - 1.0 mg/dL) 1.1  H 
 
  Estimated GFR (>60 ml/min) 57  L 
 
  BUN/Creatinine Ratio (7 - 25 %) 40.0  H 
 
Hematology  
 
  CBC w Diff NO MAN DIFF REQ NO MAN DIFF REQ
 
  WBC (4.8 - 10.8 /CUMM) 12.2  H 7.6
 
  RBC (4.20 - 5.40 /CUMM) 2.06  L 2.21  L
 
  Hgb (12.0 - 16.0 G/DL) 7.0 *L 7.5  L
 
  Hct (37 - 47 %) 20.7  L 21.7  L
 
  MCV (81.0 - 99.0 FL) 100.2  H 98.2
 
  MCH (27.0 - 31.0 PG) 34.1  H 33.8  H
 
  MCHC (33.0 - 37.0 G/DL) 34.1 34.4
 
  RDW (11.5 - 14.5 %) 21.1  H 21.0  H
 
  Plt Count (130 - 400 /CUMM) 116  L 116  L
 
  MPV (7.4 - 10.4 FL) 8.4 7.8
 
  Gran % (42.2 - 75.2 %) 72.6 90.2  H
 
  Lymphocytes % (20.5 - 51.1 %) 11.1  L 6.9  L
 
  Monocytes % (1.7 - 9.3 %) 16.2  H 2.8
 
  Eosinophils % (0 - 5 %) 0 0.1
 
  Basophils % (0.0 - 2.0 %) 0.1 0
 
  Absolute Granulocytes (1.4 - 6.5 /CUMM) 8.8  H 6.9  H
 
  Absolute Lymphocytes (1.2 - 3.4 /CUMM) 1.3 0.5  L
 
  Absolute Monocytes (0.10 - 0.60 /CUMM) 2.0  H 0.2
 
  Absolute Eosinophils (0.0 - 0.7 /CUMM) 0 0
 
  Absolute Basophils (0.0 - 0.2 /CUMM) 0 0
 
 
 
 
 05/25 05/25 2009 0615
 
Chemistry  
 
  Sodium (137 - 145 mmol/L)  127  L
 
  Potassium (3.5 - 5.1 mmol/L)  4.2
 
  Chloride (98 - 107 mmol/L)  92  L
 
  Carbon Dioxide (22 - 30 mmol/L)  27
 
  Anion Gap (5 - 16)  8
 
  BUN (7 - 17 mg/dL)  40  H
 
  Creatinine (0.5 - 1.0 mg/dL)  1.1  H
 
  Estimated GFR (>60 ml/min)  57  L
 
  BUN/Creatinine Ratio (7 - 25 %)  36.4  H
 
  Total Bilirubin (0.2 - 1.3 mg/dL)  8.3  H
 
  Direct Bilirubin (< 0.4 mg/dL)  4.7  H
 
  AST (14 - 36 U/L)  60  H
 
  ALT (9 - 52 U/L)  27
 
  Alkaline Phosphatase (<127 U/L)  140  H
 
  Total Protein (6.3 - 8.2 g/dL)  6.0  L
 
  Albumin (3.5 - 5.0 g/dL)  2.8  L
 
Coagulation  
 
  PT (9.4 - 12.5 SEC)  18.2  H
 
  INR (0.90 - 1.19)  1.66  H
 
Hematology  
 
  CBC w Diff  NO MAN DIFF REQ
 
  WBC (4.8 - 10.8 /CUMM)  11.5  H
 
  RBC (4.20 - 5.40 /CUMM)  1.82  L
 
  Hgb (12.0 - 16.0 G/DL)  6.6 *L
 
  Hct (37 - 47 %)  18.5 *L
 
  MCV (81.0 - 99.0 FL)  101.7  H
 
  MCH (27.0 - 31.0 PG)  36.0  H
 
  MCHC (33.0 - 37.0 G/DL)  35.4
 
  RDW (11.5 - 14.5 %)  19.5  H
 
  Plt Count (130 - 400 /CUMM)  148
 
  MPV (7.4 - 10.4 FL)  8.0
 
  Gran % (42.2 - 75.2 %)  67.3
 
  Lymphocytes % (20.5 - 51.1 %)  18.6  L
 
  Monocytes % (1.7 - 9.3 %)  12.9  H
 
  Eosinophils % (0 - 5 %)  0.7
 
  Basophils % (0.0 - 2.0 %)  0.5
 
  Absolute Granulocytes (1.4 - 6.5 /CUMM)  7.7  H
 
  Absolute Lymphocytes (1.2 - 3.4 /CUMM)  2.1
 
  Absolute Monocytes (0.10 - 0.60 /CUMM)  1.5  H
 
  Absolute Eosinophils (0.0 - 0.7 /CUMM)  0.1
 
  Absolute Basophils (0.0 - 0.2 /CUMM)  0.1
 
Urines  
 
  Ur Random Creatinine (mg/dL) 99.4 
 
  Ur Random Sodium (30 - 90 mmol/L) < 5  L 
 
  Ur Random Potassium (mmol/L) 18.7 
 
  Fraction Sodium Excret (<1% %) 0.0 
 
 
 
 
 05/24 05/24 05/24 2010 1124 UNK
 
Hematology   
 
  CBC w Diff NO MAN DIFF REQ  
 
  WBC (4.8 - 10.8 /CUMM) 11.4  H  
 
  RBC (4.20 - 5.40 /CUMM) 1.78  L  
 
  Hgb (12.0 - 16.0 G/DL) 6.4 *L  
 
  Hct (37 - 47 %) 18.1 *L  
 
  MCV (81.0 - 99.0 FL) 101.6  H  
 
  MCH (27.0 - 31.0 PG) 35.9  H  
 
  MCHC (33.0 - 37.0 G/DL) 35.3  
 
  RDW (11.5 - 14.5 %) 19.2  H  
 
  Plt Count (130 - 400 /CUMM) 140  
 
  MPV (7.4 - 10.4 FL) 7.6  
 
  Gran % (42.2 - 75.2 %) 66.6  
 
  Lymphocytes % (20.5 - 51.1 %) 16.2  L  
 
  Monocytes % (1.7 - 9.3 %) 15.6  H  
 
  Eosinophils % (0 - 5 %) 1.3  
 
  Basophils % (0.0 - 2.0 %) 0.3  
 
  Absolute Granulocytes (1.4 - 6.5 /CUMM) 7.6  H  
 
  Absolute Lymphocytes (1.2 - 3.4 /CUMM) 1.8  
 
  Absolute Monocytes (0.10 - 0.60 /CUMM) 1.8  H  
 
  Absolute Eosinophils (0.0 - 0.7 /CUMM) 0.1  
 
  Absolute Basophils (0.0 - 0.2 /CUMM) 0  
 
Other Body Source   
 
  Fluid WBC (0 - 5 /CUMM)   165  H
 
  Fld Total RBCs Counted (0 /CUMM)   48  H
 
  Fluid Total Protein  Cancelled 
 
  Fluid Triglycerides  Cancelled 
 
 
 
 
 05/24 05/24
 
 UNK 0725
 
Chemistry  
 
  Sodium (137 - 145 mmol/L)  127  L
 
  Potassium (3.5 - 5.1 mmol/L)  4.1
 
  Chloride (98 - 107 mmol/L)  90  L
 
  Carbon Dioxide (22 - 30 mmol/L)  27
 
  Anion Gap (5 - 16)  10
 
  BUN (7 - 17 mg/dL)  40  H
 
  Creatinine (0.5 - 1.0 mg/dL)  1.1  H
 
  Estimated GFR (>60 ml/min)  57  L
 
  BUN/Creatinine Ratio (7 - 25 %)  36.4  H
 
  Magnesium (1.6 - 2.3 mg/dL)  1.8
 
  Total Bilirubin (0.2 - 1.3 mg/dL)  8.2  H
 
  Direct Bilirubin (< 0.4 mg/dL)  4.9  H
 
  AST (14 - 36 U/L)  63  H
 
  ALT (9 - 52 U/L)  25
 
  Alkaline Phosphatase (<127 U/L)  145  H
 
  Total Protein (6.3 - 8.2 g/dL)  6.0  L
 
  Albumin (3.5 - 5.0 g/dL)  2.7  L
 
Hematology  
 
  CBC w Diff  NO MAN DIFF REQ
 
  WBC (4.8 - 10.8 /CUMM)  11.4  H
 
  RBC (4.20 - 5.40 /CUMM)  1.89  L
 
  Hgb (12.0 - 16.0 G/DL)  6.6 *L
 
  Hct (37 - 47 %)  19.0 *L
 
  MCV (81.0 - 99.0 FL)  100.8  H
 
  MCH (27.0 - 31.0 PG)  35.1  H
 
  MCHC (33.0 - 37.0 G/DL)  34.9
 
  RDW (11.5 - 14.5 %)  19.0  H
 
  Plt Count (130 - 400 /CUMM)  146
 
  MPV (7.4 - 10.4 FL)  7.9
 
  Gran % (42.2 - 75.2 %)  69.3
 
  Lymphocytes % (20.5 - 51.1 %)  16.0  L
 
  Monocytes % (1.7 - 9.3 %)  13.6  H
 
  Eosinophils % (0 - 5 %)  0.8
 
  Basophils % (0.0 - 2.0 %)  0.3
 
  Absolute Granulocytes (1.4 - 6.5 /CUMM)  7.9  H
 
  Absolute Lymphocytes (1.2 - 3.4 /CUMM)  1.8
 
  Lymphocytes (%) 32 
 
  Absolute Monocytes (0.10 - 0.60 /CUMM)  1.5  H
 
  Absolute Eosinophils (0.0 - 0.7 /CUMM)  0.1
 
  Absolute Basophils (0.0 - 0.2 /CUMM)  0
 
  % Normal PMNs (%) 1 
 
  Misc Hematology Test (%)    
 
Other Body Source  
 
  Fluid Glucose (mg/dL) 99 
 
  Fluid Total Protein (g/dL) 2.4 
 
  Fluid Albumin (g/dL) 1.1 
 
  Fluid LDH (U/L) 229 
 
  Fluid Amylase (U/L) 120 
 
  Fluid Triglycerides (mg/dL) 20 
 
 
 
 
 05/24 05/24
 
 0015 0015
 
Urines  
 
  Urinalysis MOD  H 
 
  Urine Color (YEL,AMB,STR) ORANG  H 
 
  Urine Clarity (CLEAR) HAZY  H 
 
  Urine pH (5.0 - 8.0) 6.0 
 
  Ur Specific Gravity (1.001 - 1.035) 1.010 
 
  Urine Protein (NEG,<30 MG/DL) NEG 
 
  Urine Ketones (NEG) NEG 
 
  Urine Nitrite (NEG) POS  H 
 
  Urine Bilirubin (NEG) POS@ICTO  H 
 
  Urine Urobilinogen (0.1  -  1.0 EU/dl) 1.0 
 
  Ur Leukocyte Esterase (NEG) TRACE  H 
 
  Ur Microscopic SEDIMENT EXAMINED 
 
  Urine RBC (0 - 5 /HPF) 1-3 
 
  Urine WBC (0 - 2 /HPF) 3-5  H 
 
  Ur Epithelial Cells (NONE,FEW) MOD  H 
 
  Urine Bacteria (NEG/NONE) FEW  H 
 
  Urine Mucus (FEW,NONE) FEW 
 
  Urine Hemoglobin (NEG) NEG 
 
  Urine Osmolality (300 - 1000 MOSM/KG)  417
 
  Ur Random Creatinine (mg/dL)  177.4
 
  Ur Random Sodium (30 - 90 mmol/L)  < 5  L
 
  Ur Random Potassium (mmol/L)  15.6
 
  Fraction Sodium Excret (<1% %)    
 
  Urine Glucose (N MG/DL) NEG 
 
 
 
 
 05/23
 
 2220
 
Chemistry 
 
  Sodium (137 - 145 mmol/L) 126  L
 
  Potassium (3.5 - 5.1 mmol/L) 4.2
 
  Chloride (98 - 107 mmol/L) 89  L
 
  Carbon Dioxide (22 - 30 mmol/L) 26
 
  Anion Gap (5 - 16) 11
 
  BUN (7 - 17 mg/dL) 42  H
 
  Creatinine (0.5 - 1.0 mg/dL) 1.1  H
 
  Estimated GFR (>60 ml/min) 57  L
 
  BUN/Creatinine Ratio (7 - 25 %) 38.2  H
 
  Glucose (65 - 99 mg/dL) 118  H
 
  Serum Osmolality (285 - 295 MOSM/KG) 271  L
 
  Calcium (8.4 - 10.2 mg/dL) 9.0
 
  Magnesium (1.6 - 2.3 mg/dL) 1.5  L
 
  Total Bilirubin (0.2 - 1.3 mg/dL) 8.0  H
 
  Direct Bilirubin (< 0.4 mg/dL) 4.9  H
 
  AST (14 - 36 U/L) 68  H
 
  ALT (9 - 52 U/L) 21
 
  Alkaline Phosphatase (<127 U/L) 159  H
 
  Ammonia (9 - 30 umol/L) < 9  L
 
  Total Protein (6.3 - 8.2 g/dL) 6.5
 
  Albumin (3.5 - 5.0 g/dL) 3.2  L
 
  Globulin (1.9 - 4.2 gm/dL) 3.3
 
  Albumin/Globulin Ratio (1.1 - 2.2 %) 1.0  L
 
  Lipase (23 - 300 U/L) 1151  H
 
Coagulation 
 
  PT (9.4 - 12.5 SEC) 18.1  H
 
  INR (0.90 - 1.19) 1.65  H
 
  APTT (25 - 37 SEC) 34
 
Hematology 
 
  CBC w Diff NO MAN DIFF REQ
 
  WBC (4.8 - 10.8 /CUMM) 12.7  H
 
  RBC (4.20 - 5.40 /CUMM) 2.02  L
 
  Hgb (12.0 - 16.0 G/DL) 7.1 *L
 
  Hct (37 - 47 %) 20.4  L
 
  MCV (81.0 - 99.0 FL) 101.0  H
 
  MCH (27.0 - 31.0 PG) 35.0  H
 
  MCHC (33.0 - 37.0 G/DL) 34.7
 
  RDW (11.5 - 14.5 %) 19.0  H
 
  Plt Count (130 - 400 /CUMM) 165
 
  MPV (7.4 - 10.4 FL) 8.3
 
  Gran % (42.2 - 75.2 %) 73.9
 
  Lymphocytes % (20.5 - 51.1 %) 12.5  L
 
  Monocytes % (1.7 - 9.3 %) 12.3  H
 
  Eosinophils % (0 - 5 %) 0.9
 
  Basophils % (0.0 - 2.0 %) 0.4
 
  Absolute Granulocytes (1.4 - 6.5 /CUMM) 9.4  H
 
  Absolute Lymphocytes (1.2 - 3.4 /CUMM) 1.6
 
  Absolute Monocytes (0.10 - 0.60 /CUMM) 1.6  H
 
  Absolute Eosinophils (0.0 - 0.7 /CUMM) 0.1
 
  Absolute Basophils (0.0 - 0.2 /CUMM) 0

## 2018-05-26 NOTE — PN- ATT ADDEND
Attending Addendum
Attending Brief Note
Patient seen and examined.  Plan of care discussed with the medical team and the
patient.  Available lab work and radiology test reports were reviewed.  Patient 
is lying in bed comfortably and denies any acute pain fever chills difficulty 
breathing.  she denies any nausea vomiting.  Yesterday she received 1 unit of 
blood without any events.  
 
Exam:
General: Patient awake somewhat more alert today and oriented without any 
distress ; patient is emeciated
CVS: S1 plus S2 without any murmur or gallops 
Chest: Few scattered crepitation without any wheeze.  There is no respiratory 
distress. 
Abdomen: Soft and distended non-tender, bowel sound present, no guarding or 
rebound; signs of ascites are noted with prominent veins 
CNS: Awake  oriented without any focal neuro deficit and follows commands  
appropriately; no asterixis noted; 
Extremities: No edema; no clubbing or cyanosis noted; right upper arm ecchymosis
noted
 
Assessment
* Decompensated alcoholic liver cirrhosis- child sprague class B with score of 
approx. 12
* Suspected Hepatorenal syndrome
* Hyponatremia
* Hypomagnesemia
* Borderline macrocytic anemia with unknown etiology, possibly secondary to 
esophageal varices/duodenitis
* Borderline hypotension
* Severe anemia- status post blood transfusion May 25 with appropriate rise in 
hematocrit
* Right arm ecchymosis due to extravasation of blood during transfusion
* History of alcohol abuse currently sober- pentoxifylline stopped
Plan
* Continue current management plan
* Continue albumin and lactulose
* Repeat CBC and BEP in a.m.
 
 
 Current Medications
 
 
  Sig/Reagan Start time  Last
 
Medication Dose Route Stop Time Status Admin
 
Albumin Human 12.5 GM Q8 05/25 0815 AC 05/26
 
  IV 05/26 2355  0510
 
Ferrous Sulfate 325 MG BID 05/24 2100 AC 05/26
 
  PO   0804
 
Folic Acid 1 MG DAILY 05/24 0900 AC 05/26
 
  PO   0804
 
Lactulose 20 GM BID 05/25 0900 AC 05/26
 
  PO   0804
 
Nicotine 14 MG DAILY AS NEEDED PRN 05/24 0145 AC 05/26
 
  TOP   0051
 
Oxycodone HCl 5 MG Q8P PRN 05/24 0145 AC 05/26
 
  PO   0051
 
Pantoprazole Sodium 40 MG DAILY 05/24 0145 AC 05/26
 
  IV   0804
 
Thiamine HCl 100 MG DAILY 05/24 0900 AC 05/26
 
  PO   0804
 
 
Laboratory Tests
 
 
 
05/26/18 1055:
Sodium Pending, Potassium Pending, Chloride Pending, Carbon Dioxide Pending, 
Anion Gap Pending, BUN Pending, Creatinine Pending, BUN/Creatinine Ratio Pending
, CBC w Diff Pending, WBC Pending, RBC Pending, Hgb Pending, Hct Pending, MCV 
Pending, MCH Pending, MCHC Pending, RDW Pending, Plt Count Pending, MPV Pending
 
05/25/18 2015:
CBC w Diff NO MAN DIFF REQ, RBC 2.21  L, MCV 98.2, MCH 33.8  H, MCHC 34.4, RDW 
21.0  H, MPV 7.8, Gran % 90.2  H, Lymphocytes % 6.9  L, Monocytes % 2.8, 
Eosinophils % 0.1, Basophils % 0, Absolute Granulocytes 6.9  H, Absolute 
Lymphocytes 0.5  L, Absolute Monocytes 0.2, Absolute Eosinophils 0, Absolute 
Basophils 0
 
05/25/18 2009:
Ur Random Creatinine 99.4, Ur Random Sodium < 5  L, Ur Random Potassium 18.7, 
Fraction Sodium Excret 0.0
 
05/25/18 0615:
Anion Gap 8, Estimated GFR 57  L, BUN/Creatinine Ratio 36.4  H, Total Bilirubin 
8.3  H, Direct Bilirubin 4.7  H, AST 60  H, ALT 27, Alkaline Phosphatase 140  H,
Total Protein 6.0  L, Albumin 2.8  L, PT 18.2  H, INR 1.66  H, CBC w Diff NO MAN
DIFF REQ, RBC 1.82  L, .7  H, MCH 36.0  H, MCHC 35.4, RDW 19.5  H, MPV 
8.0, Gran % 67.3, Lymphocytes % 18.6  L, Monocytes % 12.9  H, Eosinophils % 0.7,
Basophils % 0.5, Absolute Granulocytes 7.7  H, Absolute Lymphocytes 2.1, 
Absolute Monocytes 1.5  H, Absolute Eosinophils 0.1, Absolute Basophils 0.1
 
05/24/18 2010:
CBC w Diff NO MAN DIFF REQ, RBC 1.78  L, .6  H, MCH 35.9  H, MCHC 35.3, 
RDW 19.2  H, MPV 7.6, Gran % 66.6, Lymphocytes % 16.2  L, Monocytes % 15.6  H, 
Eosinophils % 1.3, Basophils % 0.3, Absolute Granulocytes 7.6  H, Absolute 
Lymphocytes 1.8, Absolute Monocytes 1.8  H, Absolute Eosinophils 0.1, Absolute 
Basophils 0
 
05/24/18 1124:
Fluid Total Protein Cancelled, Fluid Triglycerides Cancelled
 
05/24/18 1000:
Fluid   H, Fld Total RBCs Counted 48  H
 
05/24/18 1000:
Lymphocytes 32, % Normal PMNs 1, Misc Hematology Test   , Fluid Glucose 99, 
Fluid Total Protein 2.4, Fluid Albumin 1.1, Fluid , Fluid Amylase 120, 
Fluid Triglycerides 20
 
05/24/18 0725:
Anion Gap 10, Estimated GFR 57  L, BUN/Creatinine Ratio 36.4  H, Magnesium 1.8, 
Total Bilirubin 8.2  H, Direct Bilirubin 4.9  H, AST 63  H, ALT 25, Alkaline 
Phosphatase 145  H, Total Protein 6.0  L, Albumin 2.7  L, CBC w Diff NO MAN DIFF
REQ, RBC 1.89  L, .8  H, MCH 35.1  H, MCHC 34.9, RDW 19.0  H, MPV 7.9, 
Gran % 69.3, Lymphocytes % 16.0  L, Monocytes % 13.6  H, Eosinophils % 0.8, 
Basophils % 0.3, Absolute Granulocytes 7.9  H, Absolute Lymphocytes 1.8, 
Absolute Monocytes 1.5  H, Absolute Eosinophils 0.1, Absolute Basophils 0
 
05/24/18 0015:
Urinalysis MOD  H, Urine Color ORANG  H, Urine Clarity HAZY  H, Urine pH 6.0, Ur
Specific Gravity 1.010, Urine Protein NEG, Urine Ketones NEG, Urine Nitrite POS 
H, Urine Bilirubin POS@ICTO  H, Urine Urobilinogen 1.0, Ur Leukocyte Esterase 
TRACE  H, Ur Microscopic SEDIMENT EXAMINED, Urine RBC 1-3, Urine WBC 3-5  H, Ur 
Epithelial Cells MOD  H, Urine Bacteria FEW  H, Urine Mucus FEW, Urine 
Hemoglobin NEG, Urine Glucose NEG
 
05/24/18 0015:
Urine Osmolality 417, Ur Random Creatinine 177.4, Ur Random Sodium < 5  L, Ur 
Random Potassium 15.6, Fraction Sodium Excret   
 
05/23/18 2220:
Anion Gap 11, Estimated GFR 57  L, BUN/Creatinine Ratio 38.2  H, Glucose 118  H,
Serum Osmolality 271  L, Calcium 9.0, Magnesium 1.5  L, Total Bilirubin 8.0  H, 
Direct Bilirubin 4.9  H, AST 68  H, ALT 21, Alkaline Phosphatase 159  H, Ammonia
< 9  L, Total Protein 6.5, Albumin 3.2  L, Globulin 3.3, Albumin/Globulin Ratio 
1.0  L, Lipase 1151  H, PT 18.1  H, INR 1.65  H, APTT 34, CBC w Diff NO MAN DIFF
REQ, RBC 2.02  L, .0  H, MCH 35.0  H, MCHC 34.7, RDW 19.0  H, MPV 8.3, 
Gran % 73.9, Lymphocytes % 12.5  L, Monocytes % 12.3  H, Eosinophils % 0.9, 
Basophils % 0.4, Absolute Granulocytes 9.4  H, Absolute Lymphocytes 1.6, 
Absolute Monocytes 1.6  H, Absolute Eosinophils 0.1, Absolute Basophils 0
 Microbiology
05/24 1124  BODY FLUID: Body Fluid Culture - RES
05/24 1124  BODY FLUID: Gram Stain - RES
 
 Vital Signs
 
 
Date Time Temp Pulse Resp B/P B/P Pulse O2 O2 Flow FiO2
 
     Mean Ox Delivery Rate 
 
05/26 0618 97.8 88 16 114/60  97 Room Air  
 
05/25 2106 98.8 100 16 118/70  98 Room Air  
 
05/25 1800 992.0 105 20 110/62     
 
05/25 1600 98.8 99 20 110/70     
 
05/25 1600 98.8 99 20 110/70  100 Room Air  
 
05/25 1400 98.8 99 20 110/70     
 
 
 Intake & Output
 
 
 05/26 1600 05/26 0800 05/26 0000
 
Intake Total  60 
 
Output Total   
 
Balance  60 
 
    
 
Intake, Oral  60 
 
Number   1
 
Bowel   
 
Movements   
 
Patient  117 lb 
 
Weight   
 
Weight  Bed scale 
 
Measurement   
 
Method

## 2018-05-27 VITALS — DIASTOLIC BLOOD PRESSURE: 70 MMHG | SYSTOLIC BLOOD PRESSURE: 110 MMHG

## 2018-05-27 VITALS — SYSTOLIC BLOOD PRESSURE: 120 MMHG | DIASTOLIC BLOOD PRESSURE: 58 MMHG

## 2018-05-27 VITALS — DIASTOLIC BLOOD PRESSURE: 64 MMHG | SYSTOLIC BLOOD PRESSURE: 118 MMHG

## 2018-05-27 VITALS — SYSTOLIC BLOOD PRESSURE: 118 MMHG | DIASTOLIC BLOOD PRESSURE: 64 MMHG

## 2018-05-27 VITALS — DIASTOLIC BLOOD PRESSURE: 60 MMHG | SYSTOLIC BLOOD PRESSURE: 118 MMHG

## 2018-05-27 VITALS — DIASTOLIC BLOOD PRESSURE: 58 MMHG | SYSTOLIC BLOOD PRESSURE: 120 MMHG

## 2018-05-27 LAB
ABSOLUTE GRANULOCYTE CT: 8.2 /CUMM (ref 1.4–6.5)
BASOPHILS # BLD: 0 /CUMM (ref 0–0.2)
BASOPHILS NFR BLD: 0.3 % (ref 0–2)
EOSINOPHIL # BLD: 0 /CUMM (ref 0–0.7)
EOSINOPHIL NFR BLD: 0.2 % (ref 0–5)
ERYTHROCYTE [DISTWIDTH] IN BLOOD BY AUTOMATED COUNT: 20.6 % (ref 11.5–14.5)
GRANULOCYTES NFR BLD: 69.6 % (ref 42.2–75.2)
HCT VFR BLD CALC: 22.1 % (ref 37–47)
LYMPHOCYTES # BLD: 2 /CUMM (ref 1.2–3.4)
MCH RBC QN AUTO: 34.7 PG (ref 27–31)
MCHC RBC AUTO-ENTMCNC: 34.8 G/DL (ref 33–37)
MCV RBC AUTO: 99.9 FL (ref 81–99)
MONOCYTES # BLD: 1.5 /CUMM (ref 0.1–0.6)
PLATELET # BLD: 132 /CUMM (ref 130–400)
PMV BLD AUTO: 8.4 FL (ref 7.4–10.4)
RED BLOOD CELL CT: 2.22 /CUMM (ref 4.2–5.4)
WBC # BLD AUTO: 11.8 /CUMM (ref 4.8–10.8)

## 2018-05-27 NOTE — RADIOLOGY REPORT
EXAMINATION:
XR ABDOMEN MULTIPLE VIEWS
 
CLINICAL INDICATION:
Obstipation
 
COMPARISON:
None
 
TECHNIQUE:
2 views of the abdomen.
 
FINDINGS:
Borderline dilated air-filled small bowel loops midabdomen. There is air in
the colon. The bowel loops are clustered mid abdomen with increasing girth
compatible with ascites. Included lung bases are clear.
 
IMPRESSION:
 
1. Mildly dilated air-filled small bowel loops midabdomen questionable mild
ileus, clinical correlation and follow-up recommended.
2. Ascites.

## 2018-05-27 NOTE — PN- HOUSESTAFF
Subjective
Follow-up For:
Decompensated alcoholic liver cirrhosis
Hyponatremia
Hypomagnesemia
Borderline macrocytic anemia with unknown etiology
Borderline hypotension
Severe anemia
Right arm ecchymosis due to extravasation of blood during transfusion
History of alcohol abuse currently sober
Subjective:
Patient is lying in bed comfortably and denies pain fever chills nausea or 
vomiting, difficulty breathing.  She complains of mild abdomen pain.  Denies any
diarrhea.    
 
Review of Systems
Constitutional:
Reports: no symptoms. 
EENTM:
Reports: icterus. 
Cardiovascular:
Reports: no symptoms. 
Respiratory:
Reports: no symptoms. 
Gastrointestinal:
Reports: abdominal pain. 
Genitourinary:
Reports: no symptoms. 
Musculoskeletal:
Reports: no symptoms. 
Skin:
Reports: jaundice. 
Neurological/Psychological:
Reports: no symptoms. 
Hematologic/Endocrine:
Reports: bruising. 
 
Objective
Last 24 Hrs of Vital Signs/I&O
 Vital Signs
 
 
Date Time Temp Pulse Resp B/P B/P Pulse O2 O2 Flow FiO2
 
     Mean Ox Delivery Rate 
 
 1550 98.1 96 20 120/58  98 Room Air  
 
 1200 98.8 105 18 110/70     
 
 1000 98.3 114 18 118/64     
 
 0800 98.3 114 18 118/64     
 
 0647 98.3 114 18 118/64  97   
 
 2158 98.1 98 18 116/68  94   
 
 
 Intake & Output
 
 
  1600  0800  0000
 
Intake Total 985 100 220
 
Output Total 350  
 
Balance 635 100 220
 
    
 
Intake,   120
 
Intake, Oral 360 100 100
 
Number 0  
 
Bowel   
 
Movements   
 
Output, Urine 350  
 
Patient  123 lb 
 
Weight   
 
 
 
 
Physical Exam
General Appearance: Alert, Oriented X3, Cooperative, No Acute Distress
Skin: No Rashes, No Breakdown, No Significant Lesion
Skin Temp/Moisture Exam: Warm/Dry
Sepsis Skin Exam (color): Normal for Ethnicity
HEENT: Atraumatic, PERRLA, EOMI, Mucous Membr. moist/pink
Cardiovascular: Regular Rate, Normal S1, Normal S2, No Murmurs
Lungs: Clear to Auscultation, Normal Air Movement
Abdomen: Normal Bowel Sounds, No Masses, tense, more distension, some abdominal 
pain with and without palpation, positive bowel sounds.  more pain and 
discomfort when she sits up.
Neurological: Normal Speech
Extremities: No Clubbing, No Cyanosis, No Edema, Normal Pulses, No Tenderness/
Swelling
Vascular: Normal Pulses, Pulses Symmetrical
Sepsis Peripheral Pulse Location: Radial
Current Medications:
 Current Medications
 
 
  Sig/Reagan Start time  Last
 
Medication Dose Route Stop Time Status Admin
 
Albumin Human 62.5 GM DAILY  0945 AC 
 
  IV  0901  1026
 
Albumin Human 12.5 GM Q8  0815 DC 
 
  IV  2355  2120
 
Ferrous Sulfate 325 MG BID  2100 AC 
 
  PO   0849
 
Folic Acid 1 MG DAILY  0900 AC 
 
  PO   0849
 
Lactulose 20 GM BID  0900 AC 
 
  PO   0848
 
Midodrine 7.5 MG 0800,1200,1600  1200 AC 
 
  PO   1644
 
Nicotine 14 MG DAILY AS NEEDED PRN  0145  
 
  TOP   2324
 
Octreotide Acetate 100 MCG TID  0945  
 
  SC   1400
 
Ondansetron HCl 4 MG ONCE ONE  0700 DC 
 
  IV  0701  0650
 
Ondansetron HCl 4 MG ONCE ONE  0645 CAN 
 
  PO  0646  
 
Oxycodone HCl 5 MG Q8P PRN  0145  
 
  PO   2324
 
Pantoprazole Sodium 40 MG DAILY  0145 AC 
 
  IV   0848
 
Thiamine HCl 100 MG DAILY  0900 AC 
 
  PO   0849
 
 
 
 
Last 24 Hrs of Lab/Raghav Results
Last 24 Hrs of Labs/Mics:
 Laboratory Tests
 
18 0740:
Anion Gap 11, Estimated GFR > 60, BUN/Creatinine Ratio 39.0  H, CBC w Diff NO 
MAN DIFF REQ, RBC 2.22  L, MCV 99.9  H, MCH 34.7  H, MCHC 34.8, RDW 20.6  H, MPV
8.4, Gran % 69.6, Lymphocytes % 17.1  L, Monocytes % 12.8  H, Eosinophils % 0.2,
Basophils % 0.3, Absolute Granulocytes 8.2  H, Absolute Lymphocytes 2.0, 
Absolute Monocytes 1.5  H, Absolute Eosinophils 0, Absolute Basophils 0
 
18 2250:
Urine Color YEL, Urine Clarity HAZY  H, Urine pH 6.0, Ur Specific Gravity <= 
1.005, Urine Protein NEG, Urine Ketones NEG, Urine Nitrite NEG, Urine Bilirubin 
NEG, Urine Urobilinogen 1.0, Ur Leukocyte Esterase TRACE  H, Ur Microscopic 
SEDIMENT EXAMINED, Urine WBC 1-3  H, Ur Epithelial Cells FEW, Urine Bacteria FEW
 H, Urine Hemoglobin NEG, Urine Glucose NEG
 
18:
Ur Random Creatinine 95.4, Ur Random Sodium < 5  L, Ur Random Potassium 16.5, 
Fraction Sodium Excret 0.0
 
18:
Fluid WBC Cancelled, Fld Total RBCs Counted Cancelled
 
18:
Fluid Glucose Cancelled, Fluid Total Protein Cancelled, Fluid Albumin Cancelled,
Fluid LDH Cancelled, Fluid Amylase Cancelled
 Microbiology
2250  URINE ROUT: Urine Culture - RES
1832  BODY FLUID: Body Fluid Culture - CAN
                Cancelled: NOT REC'D IN LAB - REORDER WHEN COLLECTED
1832  BODY FLUID: Gram Stain - CAN
                Cancelled: NOT REC'D IN LAB - REORDER WHEN COLLECTED
 
 
 
Assessment/Plan
Assessment:
Ms. Sanchez is a 31yo F w/ PMH of alcohol dependence, alcohol liver cirrhosis/
failure with hepatorenal syndrome, from home presented to ER with numerous 
complaints including medication r refill of oxycodone, abdominal/back pain. 
Patient was recent discharge from [facility] on 2018, for treatment of
hyponatremia with fluid restriction, hepatorenal syndrome with IV albumin 
infusion/midodrine/octreotide/pentoxifylline 400 mg p.o. 3 times daily 4 weeks 
starting on 2018.  Patient underwent EGD 2018, and was diagnosed with 
stage I esophageal varices, however not a candidate of beta-blocker due to 
hepatorenal syndrome.  Patient was also treated with acute blood loss was 2 
units of PRBC on 2018.
 
On admission,
Vitals: Afebrile, tachycardia 123, RR 18, /79, 98% on room air
 
-CBC: Mild leukocytosis 12.7, microcytic anemia 7.1/20.4 (baseline 10.1 on ).
-BMP: Hyponatremia 126, K4.2, creatinine 1.1 (baseline 0.1 in 2018).  
Hypomagnesemia 1.5, elevated alk phos 159, albumin 3.2, lipase 1151
-UA/Microbiology:
-Misc: Paracentesis cytology showed no identification of malignancy.
-Ab CT: ascites, however no pancreatitis
 
 
Problem list & Assessment: 
#Decompensated alcoholic liver cirrhosis
#Question of hepatorenal syndrome
#Hyponatremia
#Hypomagnesemia
#Borderline macrocytic anemia with unknown etiology, possibly secondary to 
esophageal varices/duodenitis, hemoglobin below 7
 
 
Hospital Course:
-For concern of obstruction we have done an x-ray abdomen which showed mildly 
dilated air-filled small bowel loops with questionable mild ileus.  Of note, 
last BM .
-Diagnostic para showed SAAG over 1.1 indicative of portal hypertension and no 
SBP.  Patient will have paracentesis on Monday/Tuesday, therapeutic and 
diagnostic, gram stain, cell count and differential, culture and sensitivities, 
cytology, ascites albumin, LDH and total protein.  DR. BLANDON IR WILL DO IT - CALL
[phone] TO CONFIRM WHEN AS SHE MAY DO IT TODAY  IF SHE CAN MAKE IT INTO 
THE HOSPITAL BUT IF NOT TODAY, SHE WILL COME IF NEEDED BEFORE , 
WHICH IS THE NEXT TIME SHE WOULD BE IN-HOUSE .
-GI doubts hepatorenal syndrome as creatinine has remained stable.  With the 
large volume paracentesis we will give 8 g of albumin for every liter withdrawn.
 We will rise fluids as we believe this is contributing to her ascites.  Of note
the patient received on the last shift 650 mL of fluids but strangely, most of 
her medications are by mouth at this point.  We will minimize IV medications by 
switching her IV Protonix to oral.
-DC octreotide
-PT to mobilize the patient as this may be contributing factor
-Replete electrolytes as needed.
-continue lactulose 
-Patient blood pressure today 100/62.  Patient is on albumin to expand the 
intravascular volume.  
-Patient did have extravasation of first unit of blood transfusion.  So far 
patient has got 2 units of blood transfusion.  Hemoglobin today is 7.7.

abdominal pain was most likely due to the increased body weight, ascites causing
chronic back pain/muscular strain, was negative physical examination findings on
spine/imaging/strength or neurological deficits.

 
 
 
DVT prophylaxis ALPS
Clear liquid diet was 1000 cc fluid restriction
Full Code
Problem List:
 1. Alcoholic cirrhosis of liver with ascites
 
 2. Symptomatic anemia
 
 3. Esophageal varices determined by endoscopy
 
 4. Hepatorenal syndrome
 
Pain Ratin
Pain Location:
abdomen 
Pain Goal: Pain 4 or less
Pain Plan:
prn
Tomorrow's Labs & Rationales:
cbc bep

## 2018-05-27 NOTE — PN- GASTROENTEROLOGY
Assessment/Plan GI
Assessment/Recommendations:
ASSESSMENT:
1.  Cirrhosis
2.  Ascites
3.  Jaundice
4.  Chronic kidney disease due to renal dysfunction.  Do not believe the patient
has hepatorenal syndrome.  Patient has had no increase in creatinine and 
creatinine has been less than 1.5.
5.  History of alcohol abuse now abstinent
 
RECOMMENDATIONS:
1.  Suggest large-volume paracentesis.  Must sent for Gram stain, cell count and
differential, culture and sensitivities, cytology, ascites albumin, LDH and 
total protein.
2.  Stop all IV fluids.  Patient is tolerating diet.  IV fluids are likely 
contributing to patient's increasing abdominal distention.
3.  Discontinue octreotide
4.  Make sure the patient gets 8 g of albumin IV bolus for every liter of fluid 
that is withdrawn at time of paracentesis.
5.  Change IV protonix to PO
6.  Spoke with Dr. Alarcon about above orders.  All ?s answered.
 
 
Subjective
Subjective:
Patient complains of increasing abdominal distention.  No nausea or vomiting.  
Remains afebrile.
 
Objective
Vital Signs and I&Os
Vital Signs
 
 
Date Time Temp Pulse Resp B/P B/P Pulse O2 O2 Flow FiO2
 
     Mean Ox Delivery Rate 
 
05/27 1550 98.1 96 20 120/58  98 Room Air  
 
05/27 1200 98.8 105 18 110/70     
 
05/27 1000 98.3 114 18 118/64     
 
05/27 0800 98.3 114 18 118/64     
 
05/27 0647 98.3 114 18 118/64  97   
 
05/26 2158 98.1 98 18 116/68  94   
 
 
 Intake & Output
 
 
 05/27 1600 05/27 0400 05/26 1600 05/26 0400 05/25 1600 05/25 0400
 
Intake Total 1085 220 470  960 360
 
Output Total 350  300  250 
 
Balance 735 220 170  710 360
 
       
 
Intake,  120 50  370 
 
Intake, Oral 460 100 420  590 360
 
Number 0  1 1 2 
 
Bowel      
 
Movements      
 
Output, Urine 350  300  250 
 
Patient 123 lb  117 lb  110 lb 
 
Weight      
 
Weight   Bed scale   
 
Measurement      
 
Method      
 
 
 
 
Physical Exam
General Appearance: awake, cachetic
Respiratory: lungs clear
Cardiovascular: regular rate/rhythm
Abdomen: normal bowel sounds, distention
Neurologic/Psychiatric: alert, oriented x 3, no asterixis
Skin: jaundice
Current Medications:
 Current Medications
 
 
  Sig/Reaagn Start time  Last
 
Medication Dose Route Stop Time Status Admin
 
Albumin Human 62.5 GM DAILY 05/27 0945 AC 05/27
 
  IV 05/28 0901  1026
 
Albumin Human 12.5 GM Q8 05/25 0815 DC 05/26
 
  IV 05/26 2355  2120
 
Ferrous Sulfate 325 MG BID 05/24 2100 AC 05/27
 
  PO   0849
 
Folic Acid 1 MG DAILY 05/24 0900 AC 05/27
 
  PO   0849
 
Lactulose 20 GM BID 05/25 0900 AC 05/27
 
  PO   0848
 
Midodrine 7.5 MG 0800,1200,1600 05/27 1200 AC 05/27
 
  PO   1644
 
Nicotine 14 MG DAILY AS NEEDED PRN 05/24 0145 AC 05/26
 
  TOP   2324
 
Octreotide Acetate 100 MCG TID 05/27 0945 AC 05/27
 
  SC   1400
 
Ondansetron HCl 4 MG ONCE ONE 05/27 0700 DC 05/27
 
  IV 05/27 0701  0650
 
Ondansetron HCl 4 MG ONCE ONE 05/27 0645 CAN 
 
  PO 05/27 0646  
 
Oxycodone HCl 5 MG Q8P PRN 05/24 0145 AC 05/26
 
  PO   2324
 
Pantoprazole Sodium 40 MG DAILY 05/24 0145 AC 05/27
 
  IV   0848
 
Thiamine HCl 100 MG DAILY 05/24 0900 AC 05/27
 
  PO   0849
 
 
 
 
Results
Pertinent Lab Results:
 Laboratory Tests
 
 
 05/27 05/26
 
 0740 2250
 
Chemistry  
 
  Sodium (137 - 145 mmol/L) 130  L 
 
  Potassium (3.5 - 5.1 mmol/L) 4.6 
 
  Chloride (98 - 107 mmol/L) 91  L 
 
  Carbon Dioxide (22 - 30 mmol/L) 27 
 
  Anion Gap (5 - 16) 11 
 
  BUN (7 - 17 mg/dL) 39  H 
 
  Creatinine (0.5 - 1.0 mg/dL) 1.0 
 
  Estimated GFR (>60 ml/min) > 60 
 
  BUN/Creatinine Ratio (7 - 25 %) 39.0  H 
 
Hematology  
 
  CBC w Diff NO MAN DIFF REQ 
 
  WBC (4.8 - 10.8 /CUMM) 11.8  H 
 
  RBC (4.20 - 5.40 /CUMM) 2.22  L 
 
  Hgb (12.0 - 16.0 G/DL) 7.7  L 
 
  Hct (37 - 47 %) 22.1  L 
 
  MCV (81.0 - 99.0 FL) 99.9  H 
 
  MCH (27.0 - 31.0 PG) 34.7  H 
 
  MCHC (33.0 - 37.0 G/DL) 34.8 
 
  RDW (11.5 - 14.5 %) 20.6  H 
 
  Plt Count (130 - 400 /CUMM) 132 
 
  MPV (7.4 - 10.4 FL) 8.4 
 
  Gran % (42.2 - 75.2 %) 69.6 
 
  Lymphocytes % (20.5 - 51.1 %) 17.1  L 
 
  Monocytes % (1.7 - 9.3 %) 12.8  H 
 
  Eosinophils % (0 - 5 %) 0.2 
 
  Basophils % (0.0 - 2.0 %) 0.3 
 
  Absolute Granulocytes (1.4 - 6.5 /CUMM) 8.2  H 
 
  Absolute Lymphocytes (1.2 - 3.4 /CUMM) 2.0 
 
  Absolute Monocytes (0.10 - 0.60 /CUMM) 1.5  H 
 
  Absolute Eosinophils (0.0 - 0.7 /CUMM) 0 
 
  Absolute Basophils (0.0 - 0.2 /CUMM) 0 
 
Urines  
 
  Urine Color (YEL,AMB,STR)  YEL
 
  Urine Clarity (CLEAR)  HAZY  H
 
  Urine pH (5.0 - 8.0)  6.0
 
  Ur Specific Gravity (1.001 - 1.035)  <= 1.005
 
  Urine Protein (NEG,<30 MG/DL)  NEG
 
  Urine Ketones (NEG)  NEG
 
  Urine Nitrite (NEG)  NEG
 
  Urine Bilirubin (NEG)  NEG
 
  Urine Urobilinogen (0.1  -  1.0 EU/dl)  1.0
 
  Ur Leukocyte Esterase (NEG)  TRACE  H
 
  Ur Microscopic  SEDIMENT EXAMINED
 
  Urine WBC (0 - 2 /HPF)  1-3  H
 
  Ur Epithelial Cells (NONE,FEW)  FEW
 
  Urine Bacteria (NEG/NONE)  FEW  H
 
  Urine Hemoglobin (NEG)  NEG
 
  Urine Glucose (N MG/DL)  NEG
 
 
 
 
 05/26 05/26 05/26
 
 8940 1832 1832
 
Other Body Source   
 
  Fluid WBC  Cancelled 
 
  Fld Total RBCs Counted  Cancelled 
 
  Fluid Glucose   Cancelled
 
  Fluid Total Protein   Cancelled
 
  Fluid Albumin   Cancelled
 
  Fluid LDH   Cancelled
 
  Fluid Amylase   Cancelled
 
Urines   
 
  Ur Random Creatinine (mg/dL) 95.4  
 
  Ur Random Sodium (30 - 90 mmol/L) < 5  L  
 
  Ur Random Potassium (mmol/L) 16.5  
 
  Fraction Sodium Excret (<1% %) 0.0  
 
 
 
 
 05/26 05/25 1055 2015
 
Chemistry  
 
  Sodium (137 - 145 mmol/L) 128  L 
 
  Potassium (3.5 - 5.1 mmol/L) 4.4 
 
  Chloride (98 - 107 mmol/L) 90  L 
 
  Carbon Dioxide (22 - 30 mmol/L) 27 
 
  Anion Gap (5 - 16) 11 
 
  BUN (7 - 17 mg/dL) 44  H 
 
  Creatinine (0.5 - 1.0 mg/dL) 1.1  H 
 
  Estimated GFR (>60 ml/min) 57  L 
 
  BUN/Creatinine Ratio (7 - 25 %) 40.0  H 
 
Hematology  
 
  CBC w Diff NO MAN DIFF REQ NO MAN DIFF REQ
 
  WBC (4.8 - 10.8 /CUMM) 12.2  H 7.6
 
  RBC (4.20 - 5.40 /CUMM) 2.06  L 2.21  L
 
  Hgb (12.0 - 16.0 G/DL) 7.0 *L 7.5  L
 
  Hct (37 - 47 %) 20.7  L 21.7  L
 
  MCV (81.0 - 99.0 FL) 100.2  H 98.2
 
  MCH (27.0 - 31.0 PG) 34.1  H 33.8  H
 
  MCHC (33.0 - 37.0 G/DL) 34.1 34.4
 
  RDW (11.5 - 14.5 %) 21.1  H 21.0  H
 
  Plt Count (130 - 400 /CUMM) 116  L 116  L
 
  MPV (7.4 - 10.4 FL) 8.4 7.8
 
  Gran % (42.2 - 75.2 %) 72.6 90.2  H
 
  Lymphocytes % (20.5 - 51.1 %) 11.1  L 6.9  L
 
  Monocytes % (1.7 - 9.3 %) 16.2  H 2.8
 
  Eosinophils % (0 - 5 %) 0 0.1
 
  Basophils % (0.0 - 2.0 %) 0.1 0
 
  Absolute Granulocytes (1.4 - 6.5 /CUMM) 8.8  H 6.9  H
 
  Absolute Lymphocytes (1.2 - 3.4 /CUMM) 1.3 0.5  L
 
  Absolute Monocytes (0.10 - 0.60 /CUMM) 2.0  H 0.2
 
  Absolute Eosinophils (0.0 - 0.7 /CUMM) 0 0
 
  Absolute Basophils (0.0 - 0.2 /CUMM) 0 0
 
 
 
 
 05/25 05/25 2009 0615
 
Chemistry  
 
  Sodium (137 - 145 mmol/L)  127  L
 
  Potassium (3.5 - 5.1 mmol/L)  4.2
 
  Chloride (98 - 107 mmol/L)  92  L
 
  Carbon Dioxide (22 - 30 mmol/L)  27
 
  Anion Gap (5 - 16)  8
 
  BUN (7 - 17 mg/dL)  40  H
 
  Creatinine (0.5 - 1.0 mg/dL)  1.1  H
 
  Estimated GFR (>60 ml/min)  57  L
 
  BUN/Creatinine Ratio (7 - 25 %)  36.4  H
 
  Total Bilirubin (0.2 - 1.3 mg/dL)  8.3  H
 
  Direct Bilirubin (< 0.4 mg/dL)  4.7  H
 
  AST (14 - 36 U/L)  60  H
 
  ALT (9 - 52 U/L)  27
 
  Alkaline Phosphatase (<127 U/L)  140  H
 
  Total Protein (6.3 - 8.2 g/dL)  6.0  L
 
  Albumin (3.5 - 5.0 g/dL)  2.8  L
 
Coagulation  
 
  PT (9.4 - 12.5 SEC)  18.2  H
 
  INR (0.90 - 1.19)  1.66  H
 
Hematology  
 
  CBC w Diff  NO MAN DIFF REQ
 
  WBC (4.8 - 10.8 /CUMM)  11.5  H
 
  RBC (4.20 - 5.40 /CUMM)  1.82  L
 
  Hgb (12.0 - 16.0 G/DL)  6.6 *L
 
  Hct (37 - 47 %)  18.5 *L
 
  MCV (81.0 - 99.0 FL)  101.7  H
 
  MCH (27.0 - 31.0 PG)  36.0  H
 
  MCHC (33.0 - 37.0 G/DL)  35.4
 
  RDW (11.5 - 14.5 %)  19.5  H
 
  Plt Count (130 - 400 /CUMM)  148
 
  MPV (7.4 - 10.4 FL)  8.0
 
  Gran % (42.2 - 75.2 %)  67.3
 
  Lymphocytes % (20.5 - 51.1 %)  18.6  L
 
  Monocytes % (1.7 - 9.3 %)  12.9  H
 
  Eosinophils % (0 - 5 %)  0.7
 
  Basophils % (0.0 - 2.0 %)  0.5
 
  Absolute Granulocytes (1.4 - 6.5 /CUMM)  7.7  H
 
  Absolute Lymphocytes (1.2 - 3.4 /CUMM)  2.1
 
  Absolute Monocytes (0.10 - 0.60 /CUMM)  1.5  H
 
  Absolute Eosinophils (0.0 - 0.7 /CUMM)  0.1
 
  Absolute Basophils (0.0 - 0.2 /CUMM)  0.1
 
Urines  
 
  Ur Random Creatinine (mg/dL) 99.4 
 
  Ur Random Sodium (30 - 90 mmol/L) < 5  L 
 
  Ur Random Potassium (mmol/L) 18.7 
 
  Fraction Sodium Excret (<1% %) 0.0 
 
 
 
 
 05/24 2010
 
Hematology 
 
  CBC w Diff NO MAN DIFF REQ
 
  WBC (4.8 - 10.8 /CUMM) 11.4  H
 
  RBC (4.20 - 5.40 /CUMM) 1.78  L
 
  Hgb (12.0 - 16.0 G/DL) 6.4 *L
 
  Hct (37 - 47 %) 18.1 *L
 
  MCV (81.0 - 99.0 FL) 101.6  H
 
  MCH (27.0 - 31.0 PG) 35.9  H
 
  MCHC (33.0 - 37.0 G/DL) 35.3
 
  RDW (11.5 - 14.5 %) 19.2  H
 
  Plt Count (130 - 400 /CUMM) 140
 
  MPV (7.4 - 10.4 FL) 7.6
 
  Gran % (42.2 - 75.2 %) 66.6
 
  Lymphocytes % (20.5 - 51.1 %) 16.2  L
 
  Monocytes % (1.7 - 9.3 %) 15.6  H
 
  Eosinophils % (0 - 5 %) 1.3
 
  Basophils % (0.0 - 2.0 %) 0.3
 
  Absolute Granulocytes (1.4 - 6.5 /CUMM) 7.6  H
 
  Absolute Lymphocytes (1.2 - 3.4 /CUMM) 1.8
 
  Absolute Monocytes (0.10 - 0.60 /CUMM) 1.8  H
 
  Absolute Eosinophils (0.0 - 0.7 /CUMM) 0.1
 
  Absolute Basophils (0.0 - 0.2 /CUMM) 0

## 2018-05-27 NOTE — PN- ATT ADDEND
Attending Addendum
Attending Brief Note
Patient seen and examined.  Plan of care discussed with the medical team and the
patient.  Available lab work and radiology test reports were reviewed.  Patient 
is lying in bed comfortably and denies any acute pain fever chills difficulty 
breathing.  She complains of for mild abdomen pain.   she denies any nausea 
vomiting.    
 
Exam:
General: Patient awake somewhat more alert today and oriented without any 
distress ; patient is emeciated
CVS: S1 plus S2 without any murmur or gallops 
Chest: Few scattered crepitation without any wheeze.  There is no respiratory 
distress. 
Abdomen: Tense and appears more distended than before with mild discomfort over 
palpation.  bowel sound present, no guarding or rebound; signs of ascites are 
noted with prominent veins 
CNS: Awake  oriented without any focal neuro deficit and follows commands  
appropriately; no asterixis noted; 
Extremities: No edema; no clubbing or cyanosis noted; right upper arm ecchymosis
noted
 
Assessment
* Decompensated alcoholic liver cirrhosis- child sprague class B with score of 
approx. 12 with tense ascites
* Suspected Hepatorenal syndrome- creatinine has improved
* Hyponatremia
* Hypomagnesemia
* Borderline macrocytic anemia with unknown etiology, possibly secondary to 
esophageal varices/duodenitis
* Borderline hypotension
* Severe anemia- status post blood transfusion May 25 with appropriate rise in 
hematocrit
* Right arm ecchymosis due to extravasation of blood during transfusion
* History of alcohol abuse currently sober- pentoxifylline stopped
Plan
* Obtain flatplate abdomen to rule out stomach distention
* Patient needs  a large volume paracentesis; please call IR to arrange; 
previously there was a concern due to hypotension which has improved and now 
patient can undergo large volume paracentesis
* Continue albumin and lactulose, octreotide and midodrine
 Current Medications
 
 
  Sig/Reagan Start time  Last
 
Medication Dose Route Stop Time Status Admin
 
Albumin Human 62.5 GM DAILY 05/27 0945 AC 05/27
 
  IV 05/28 0901  1026
 
Albumin Human 12.5 GM Q8 05/25 0815 DC 05/26
 
  IV 05/26 2355  2120
 
Ferrous Sulfate 325 MG BID 05/24 2100 AC 05/27
 
  PO   0849
 
Folic Acid 1 MG DAILY 05/24 0900 AC 05/27
 
  PO   0849
 
Lactulose 20 GM BID 05/25 0900 AC 05/27
 
  PO   0848
 
Midodrine 7.5 MG 0800,1200,1600 05/27 1200 AC 05/27
 
  PO   1106
 
Nicotine 14 MG DAILY AS NEEDED PRN 05/24 0145  05/26
 
  TOP   2324
 
Octreotide Acetate 100 MCG TID 05/27 0945 AC 05/27
 
  SC   1102
 
Ondansetron HCl 4 MG ONCE ONE 05/27 0700 DC 05/27
 
  IV 05/27 0701  0650
 
Ondansetron HCl 4 MG ONCE ONE 05/27 0645 CAN 
 
  PO 05/27 0646  
 
Oxycodone HCl 5 MG Q8P PRN 05/24 0145  05/26
 
  PO   2324
 
Pantoprazole Sodium 40 MG DAILY 05/24 0145 AC 05/27
 
  IV   0848
 
Thiamine HCl 100 MG DAILY 05/24 0900 AC 05/27
 
  PO   0849
 
 
Laboratory Tests
 
 
 
05/27/18 0740:
Anion Gap 11, Estimated GFR > 60, BUN/Creatinine Ratio 39.0  H, CBC w Diff NO 
MAN DIFF REQ, RBC 2.22  L, MCV 99.9  H, MCH 34.7  H, MCHC 34.8, RDW 20.6  H, MPV
8.4, Gran % 69.6, Lymphocytes % 17.1  L, Monocytes % 12.8  H, Eosinophils % 0.2,
Basophils % 0.3, Absolute Granulocytes 8.2  H, Absolute Lymphocytes 2.0, 
Absolute Monocytes 1.5  H, Absolute Eosinophils 0, Absolute Basophils 0
 
05/26/18 2250:
Urine Color YEL, Urine Clarity HAZY  H, Urine pH 6.0, Ur Specific Gravity <= 
1.005, Urine Protein NEG, Urine Ketones NEG, Urine Nitrite NEG, Urine Bilirubin 
NEG, Urine Urobilinogen 1.0, Ur Leukocyte Esterase TRACE  H, Ur Microscopic 
SEDIMENT EXAMINED, Urine WBC 1-3  H, Ur Epithelial Cells FEW, Urine Bacteria FEW
 H, Urine Hemoglobin NEG, Urine Glucose NEG
 
05/26/18 2250:
Ur Random Creatinine 95.4, Ur Random Sodium < 5  L, Ur Random Potassium 16.5, 
Fraction Sodium Excret 0.0
 
05/26/18 1832:
Fluid WBC Cancelled, Fld Total RBCs Counted Cancelled
 
05/26/18 1832:
Fluid Glucose Cancelled, Fluid Total Protein Cancelled, Fluid Albumin Cancelled,
Fluid LDH Cancelled, Fluid Amylase Cancelled
 
05/26/18 1055:
Anion Gap 11, Estimated GFR 57  L, BUN/Creatinine Ratio 40.0  H, CBC w Diff NO 
MAN DIFF REQ, RBC 2.06  L, .2  H, MCH 34.1  H, MCHC 34.1, RDW 21.1  H, 
MPV 8.4, Gran % 72.6, Lymphocytes % 11.1  L, Monocytes % 16.2  H, Eosinophils % 
0, Basophils % 0.1, Absolute Granulocytes 8.8  H, Absolute Lymphocytes 1.3, 
Absolute Monocytes 2.0  H, Absolute Eosinophils 0, Absolute Basophils 0
 
05/25/18 2015:
CBC w Diff NO MAN DIFF REQ, RBC 2.21  L, MCV 98.2, MCH 33.8  H, MCHC 34.4, RDW 
21.0  H, MPV 7.8, Gran % 90.2  H, Lymphocytes % 6.9  L, Monocytes % 2.8, 
Eosinophils % 0.1, Basophils % 0, Absolute Granulocytes 6.9  H, Absolute 
Lymphocytes 0.5  L, Absolute Monocytes 0.2, Absolute Eosinophils 0, Absolute 
Basophils 0
 
05/25/18 2009:
Ur Random Creatinine 99.4, Ur Random Sodium < 5  L, Ur Random Potassium 18.7, 
Fraction Sodium Excret 0.0
 
05/25/18 0615:
Anion Gap 8, Estimated GFR 57  L, BUN/Creatinine Ratio 36.4  H, Total Bilirubin 
8.3  H, Direct Bilirubin 4.7  H, AST 60  H, ALT 27, Alkaline Phosphatase 140  H,
Total Protein 6.0  L, Albumin 2.8  L, PT 18.2  H, INR 1.66  H, CBC w Diff NO MAN
DIFF REQ, RBC 1.82  L, .7  H, MCH 36.0  H, MCHC 35.4, RDW 19.5  H, MPV 
8.0, Gran % 67.3, Lymphocytes % 18.6  L, Monocytes % 12.9  H, Eosinophils % 0.7,
Basophils % 0.5, Absolute Granulocytes 7.7  H, Absolute Lymphocytes 2.1, 
Absolute Monocytes 1.5  H, Absolute Eosinophils 0.1, Absolute Basophils 0.1
 
05/24/18 2010:
CBC w Diff NO MAN DIFF REQ, RBC 1.78  L, .6  H, MCH 35.9  H, MCHC 35.3, 
RDW 19.2  H, MPV 7.6, Gran % 66.6, Lymphocytes % 16.2  L, Monocytes % 15.6  H, 
Eosinophils % 1.3, Basophils % 0.3, Absolute Granulocytes 7.6  H, Absolute 
Lymphocytes 1.8, Absolute Monocytes 1.8  H, Absolute Eosinophils 0.1, Absolute 
Basophils 0
 Microbiology
05/26 2250  URINE ROUT: Urine Culture - RES
05/26 1832  BODY FLUID: Body Fluid Culture - CAN
                Cancelled: NOT REC'D IN LAB - REORDER WHEN COLLECTED
05/26 1832  BODY FLUID: Gram Stain - CAN
                Cancelled: NOT REC'D IN LAB - REORDER WHEN COLLECTED
 
 Vital Signs
 
 
Date Time Temp Pulse Resp B/P B/P Pulse O2 O2 Flow FiO2
 
     Mean Ox Delivery Rate 
 
05/27 1200 98.8 105 18 110/70     
 
05/27 1000 98.3 114 18 118/64     
 
05/27 0800 98.3 114 18 118/64     
 
05/27 0647 98.3 114 18 118/64  97   
 
05/26 2158 98.1 98 18 116/68  94   
 
05/26 1600 98.1 96 20 100/52     
 
05/26 1406 98.1 96 20 100/52  98 Room Air  
 
05/26 1400 98.1 96 20 100/52     
 
 
 Intake & Output
 
 
 05/27 1600 05/27 0800 05/27 0000
 
Intake Total  100 220
 
Output Total   
 
Balance  100 220
 
    
 
Intake, IV   120
 
Intake, Oral  100 100
 
Patient  123 lb 
 
Weight

## 2018-05-28 VITALS — SYSTOLIC BLOOD PRESSURE: 110 MMHG | DIASTOLIC BLOOD PRESSURE: 50 MMHG

## 2018-05-28 VITALS — DIASTOLIC BLOOD PRESSURE: 50 MMHG | SYSTOLIC BLOOD PRESSURE: 110 MMHG

## 2018-05-28 VITALS — DIASTOLIC BLOOD PRESSURE: 49 MMHG | SYSTOLIC BLOOD PRESSURE: 104 MMHG

## 2018-05-28 VITALS — DIASTOLIC BLOOD PRESSURE: 59 MMHG | SYSTOLIC BLOOD PRESSURE: 105 MMHG

## 2018-05-28 VITALS — SYSTOLIC BLOOD PRESSURE: 104 MMHG | DIASTOLIC BLOOD PRESSURE: 48 MMHG

## 2018-05-28 VITALS — SYSTOLIC BLOOD PRESSURE: 106 MMHG | DIASTOLIC BLOOD PRESSURE: 60 MMHG

## 2018-05-28 VITALS — SYSTOLIC BLOOD PRESSURE: 104 MMHG | DIASTOLIC BLOOD PRESSURE: 49 MMHG

## 2018-05-28 LAB
ABSOLUTE GRANULOCYTE CT: 6.2 /CUMM (ref 1.4–6.5)
ABSOLUTE GRANULOCYTE CT: 8.4 /CUMM (ref 1.4–6.5)
APTT BLD: 44 SEC (ref 25–37)
BASOPHILS # BLD: 0 /CUMM (ref 0–0.2)
BASOPHILS # BLD: 0 /CUMM (ref 0–0.2)
BASOPHILS NFR BLD: 0.2 % (ref 0–2)
BASOPHILS NFR BLD: 0.3 % (ref 0–2)
EOSINOPHIL # BLD: 0.1 /CUMM (ref 0–0.7)
EOSINOPHIL # BLD: 0.1 /CUMM (ref 0–0.7)
EOSINOPHIL NFR BLD: 0.7 % (ref 0–5)
EOSINOPHIL NFR BLD: 1.2 % (ref 0–5)
ERYTHROCYTE [DISTWIDTH] IN BLOOD BY AUTOMATED COUNT: 20.1 % (ref 11.5–14.5)
ERYTHROCYTE [DISTWIDTH] IN BLOOD BY AUTOMATED COUNT: 20.3 % (ref 11.5–14.5)
GRANULOCYTES NFR BLD: 66.2 % (ref 42.2–75.2)
GRANULOCYTES NFR BLD: 73.7 % (ref 42.2–75.2)
HCT VFR BLD CALC: 21.4 % (ref 37–47)
HCT VFR BLD CALC: 23.1 % (ref 37–47)
LYMPHOCYTES # BLD: 1.4 /CUMM (ref 1.2–3.4)
LYMPHOCYTES # BLD: 1.7 /CUMM (ref 1.2–3.4)
MCH RBC QN AUTO: 34.9 PG (ref 27–31)
MCH RBC QN AUTO: 35 PG (ref 27–31)
MCHC RBC AUTO-ENTMCNC: 34.8 G/DL (ref 33–37)
MCHC RBC AUTO-ENTMCNC: 34.8 G/DL (ref 33–37)
MCV RBC AUTO: 100.3 FL (ref 81–99)
MCV RBC AUTO: 100.5 FL (ref 81–99)
MONOCYTES # BLD: 1.3 /CUMM (ref 0.1–0.6)
MONOCYTES # BLD: 1.5 /CUMM (ref 0.1–0.6)
PLATELET # BLD: 124 /CUMM (ref 130–400)
PLATELET # BLD: 134 /CUMM (ref 130–400)
PMV BLD AUTO: 8.4 FL (ref 7.4–10.4)
PMV BLD AUTO: 8.5 FL (ref 7.4–10.4)
PROTHROMBIN TIME: 19.9 SEC (ref 9.4–12.5)
RED BLOOD CELL CT: 2.13 /CUMM (ref 4.2–5.4)
RED BLOOD CELL CT: 2.31 /CUMM (ref 4.2–5.4)
WBC # BLD AUTO: 11.4 /CUMM (ref 4.8–10.8)
WBC # BLD AUTO: 9.4 /CUMM (ref 4.8–10.8)

## 2018-05-28 PROCEDURE — 0W9G3ZZ DRAINAGE OF PERITONEAL CAVITY, PERCUTANEOUS APPROACH: ICD-10-PCS

## 2018-05-28 NOTE — PN- ATT ADDEND
Attending Addendum
Attending Brief Note
Patient seen and examined.  Plan of care discussed with the medical team and the
patient.  Available lab work and radiology test reports were reviewed.  Patient 
appears to have a change in her mental status today.  She appears dazed and 
confused.  She complains of for moderate to severe abdominal pain and she is 
requesting more pain medications.  Denies any fever or chills and by mouth 
intake has decreased.  
 
Exam:
General: Patient awake but somewhat confused with the moderate  distress due to 
abdominal pain ; patient is emeciated
CVS: S1 plus S2 without any murmur or gallops 
Chest: Few scattered crepitation without any wheeze.  There is no respiratory 
distress. 
Abdomen: Tense and appears more distended than before with mild discomfort over 
palpation.  bowel sound present, no guarding or rebound; signs of ascites are 
noted with prominent veins 
CNS: Awake  but appears confused  without any focal neuro deficit and follows 
commands  appropriately; no asterixis noted; patient is having difficulty 
cooperating with exam
Extremities: No edema; no clubbing or cyanosis noted; right upper arm ecchymosis
noted
 
Assessment
* Decompensated alcoholic liver cirrhosis- child sprague class B with score of 
approx. 12 with tense ascites
* Suspected Hepatorenal syndrome- creatinine has improved and currently stable
* Suspected hepatic encephalopathy
* Hyponatremia
* Hypomagnesemia
* Borderline macrocytic anemia with unknown etiology, possibly secondary to 
esophageal varices/duodenitis
* Borderline hypotension
* Severe anemia- status post blood transfusion May 25 with appropriate rise in 
hematocrit
* Right arm ecchymosis due to extravasation of blood during transfusion
* History of alcohol abuse currently sober- pentoxifylline stopped
Plan
* Patient needs  a large volume paracentesis for both diagnostic and therapeutic
purpose; I called and spoke to Kayleigh Dixon MD we will perform ascitic tap large
-volume today.  Patient will need albumin after ascitic tap
* Continue albumin and lactulose, and midodrine
* Repeat ammonia level and continue lactulose; troponin level is increased and I
will increase his lactulose dose.  Note the patient has no reported board motion
since May 26
 
 Current Medications
 
 
  Sig/Reagan Start time  Last
 
Medication Dose Route Stop Time Status Admin
 
Albumin Human 62.5 GM DAILY 05/27 0945 DC 05/28
 
  IV 05/28 0901  0830
 
Ferrous Sulfate 325 MG BID 05/24 2100 AC 05/28
 
  PO   0827
 
Folic Acid 1 MG DAILY 05/24 0900 AC 05/28
 
  PO   0827
 
Hydromorphone HCl 0.6 MG Q6P PRN 05/28 1030 AC 
 
  IV   
 
Lactulose 20 GM BID 05/25 0900  05/28
 
  PO   0827
 
Midodrine 7.5 MG 0800,1200,1600 05/27 1200  05/28
 
  PO   0827
 
Nicotine 14 MG DAILY AS NEEDED PRN 05/24 0145  05/26
 
  TOP   2324
 
Octreotide Acetate 100 MCG TID 05/27 0945 MS 05/27
 
  SC   1400
 
Omeprazole 40 MG DAILY  05/28 0700 AC 
 
  PO   
 
Oxycodone HCl 5 MG Q8P PRN 05/24 0145  05/28
 
  PO   0933
 
Pantoprazole Sodium 40 MG DAILY 05/24 0145 MS 05/27
 
  IV   0848
 
Phosphate 250 MG PC AND AT BEDTIME 05/28 0945  
 
  PO   
 
Thiamine HCl 100 MG DAILY 05/24 0900  05/28
 
  PO   0827
 
 
Laboratory Tests
 
 
 
05/28/18 0817:
Anion Gap 11, Estimated GFR 57  L, BUN/Creatinine Ratio 35.5  H, CBC w Diff NO 
MAN DIFF REQ, RBC 2.13  L, .5  H, MCH 35.0  H, MCHC 34.8, RDW 20.3  H, 
MPV 8.4, Gran % 66.2, Lymphocytes % 18.6  L, Monocytes % 13.7  H, Eosinophils % 
1.2, Basophils % 0.3, Absolute Granulocytes 6.2, Absolute Lymphocytes 1.7, 
Absolute Monocytes 1.3  H, Absolute Eosinophils 0.1, Absolute Basophils 0
 
05/27/18 0740:
Anion Gap 11, Estimated GFR > 60, BUN/Creatinine Ratio 39.0  H, Calcium 9.5, 
Phosphorus 2.1  L, Magnesium 1.7, Vitamin B12 985  H, Folate > 20.0  H, TSH &T3 
&Free T4 Intrp 2.080, CBC w Diff NO MAN DIFF REQ, RBC 2.22  L, MCV 99.9  H, MCH 
34.7  H, MCHC 34.8, RDW 20.6  H, MPV 8.4, Gran % 69.6, Lymphocytes % 17.1  L, 
Monocytes % 12.8  H, Eosinophils % 0.2, Basophils % 0.3, Absolute Granulocytes 
8.2  H, Absolute Lymphocytes 2.0, Absolute Monocytes 1.5  H, Absolute 
Eosinophils 0, Absolute Basophils 0
 
05/26/18 2250:
Urine Color YEL, Urine Clarity HAZY  H, Urine pH 6.0, Ur Specific Gravity <= 
1.005, Urine Protein NEG, Urine Ketones NEG, Urine Nitrite NEG, Urine Bilirubin 
NEG, Urine Urobilinogen 1.0, Ur Leukocyte Esterase TRACE  H, Ur Microscopic 
SEDIMENT EXAMINED, Urine WBC 1-3  H, Ur Epithelial Cells FEW, Urine Bacteria FEW
 H, Urine Hemoglobin NEG, Urine Glucose NEG
 
05/26/18 2250:
Ur Random Creatinine 95.4, Ur Random Sodium < 5  L, Ur Random Potassium 16.5, 
Fraction Sodium Excret 0.0
 
05/26/18 1832:
Fluid WBC Cancelled, Fld Total RBCs Counted Cancelled
 
05/26/18 1832:
Fluid Glucose Cancelled, Fluid Total Protein Cancelled, Fluid Albumin Cancelled,
Fluid LDH Cancelled, Fluid Amylase Cancelled
 
05/26/18 1055:
Anion Gap 11, Estimated GFR 57  L, BUN/Creatinine Ratio 40.0  H, CBC w Diff NO 
MAN DIFF REQ, RBC 2.06  L, .2  H, MCH 34.1  H, MCHC 34.1, RDW 21.1  H, 
MPV 8.4, Gran % 72.6, Lymphocytes % 11.1  L, Monocytes % 16.2  H, Eosinophils % 
0, Basophils % 0.1, Absolute Granulocytes 8.8  H, Absolute Lymphocytes 1.3, 
Absolute Monocytes 2.0  H, Absolute Eosinophils 0, Absolute Basophils 0
 
05/25/18 2015:
CBC w Diff NO MAN DIFF REQ, RBC 2.21  L, MCV 98.2, MCH 33.8  H, MCHC 34.4, RDW 
21.0  H, MPV 7.8, Gran % 90.2  H, Lymphocytes % 6.9  L, Monocytes % 2.8, 
Eosinophils % 0.1, Basophils % 0, Absolute Granulocytes 6.9  H, Absolute 
Lymphocytes 0.5  L, Absolute Monocytes 0.2, Absolute Eosinophils 0, Absolute 
Basophils 0
 
05/25/18 2009:
Ur Random Creatinine 99.4, Ur Random Sodium < 5  L, Ur Random Potassium 18.7, 
Fraction Sodium Excret 0.0
 Microbiology
05/28 1114  BODY FLUID: Body Fluid Culture - ORD
05/28 1114  BODY FLUID: Gram Stain - ORD
05/26 2250  URINE ROUT: Urine Culture - COMP
05/26 1832  BODY FLUID: Body Fluid Culture - CAN
                Cancelled: NOT REC'D IN LAB - REORDER WHEN COLLECTED
05/26 1832  BODY FLUID: Gram Stain - CAN
                Cancelled: NOT REC'D IN LAB - REORDER WHEN COLLECTED
 
 Vital Signs
 
 
Date Time Temp Pulse Resp B/P B/P Pulse O2 O2 Flow FiO2
 
     Mean Ox Delivery Rate 
 
05/28 0645 98.6 92 18 104/48  97   
 
05/27 2214 98.9 96 20 118/60  98   
 
05/27 1800 98.1 96 20 120/58     
 
05/27 1600 98.1 96 20 120/58     
 
05/27 1550 98.1 96 20 120/58  98 Room Air  
 
05/27 1400 98.8 105 18 110/70     
 
05/27 1200 98.8 105 18 110/70     
 
 
 Intake & Output
 
 
 05/28 1600 05/28 0800 05/28 0000
 
Intake Total  240 480
 
Output Total   
 
Balance  240 480
 
    
 
Intake, Oral  240 480
 
Patient  123 lb 
 
Weight

## 2018-05-28 NOTE — CT SCAN REPORT
EXAMINATION:
CT ABDOMEN AND PELVIS WITHOUT CONTRAST
 
CLINICAL INFORMATION:
Ascites, abdominal pain.
 
COMPARISON:
05/24/2018 CT scan.
 
TECHNIQUE:
Multidetector volumetric imaging was performed from the superior aspect of
the liver through the pubic symphysis. Sagittal and coronal reformatted
images were obtained on the technologist's workstation.
 
DLP:
25.79 mGy-cm
 
FINDINGS:
 
LUNG BASES: Limited images of lower thorax demonstrate small bilateral
pleural effusions. Subsegmental opacity in the lateral right lower lobe could
represent developing atelectasis.
 
Noncontrast images of abdomen and pelvis demonstrate:
There is moderate-to-large amount of simple free intra-abdominal fluid, less
than the comparison exam. There is mesenteric stranding in the upper abdomen,
which can be as a result of ascites.
 
The gallbladder is distended and contains heterogeneous noncalcified
material, which could represent noncalcified gallstones. Evaluation for acute
cholecystitis is limited due to presence of ascites.
 
LIVER, GALLBLADDER, AND BILIARY TREE: The liver is mildly enlarged, measures
about 17.7 cm in maximum CC dimension. No biliary ductal dilatation is
present.
 
PANCREAS: Unremarkable.
 
SPLEEN: Unremarkable.
 
ADRENAL GLANDS: Unremarkable.
 
KIDNEYS AND URETERS: The kidneys are normal in size, shape, and attenuation.
No hydronephrosis, hydroureter, or calculi seen. No perinephric stranding.
 
BLADDER: The urinary bladder is empty.
 
GASTROINTESTINAL TRACT: The nondilated loops of the small bowel and colon are
seen throughout the ascites. Evaluation for wall thickness is limited. The
appendix is not seen.
 
ABDOMINAL WALL: No significant hernia is appreciated.
 
LYMPH NODES: Difficult to evaluate due to large amount of ascites.
 
VASCULAR: Unremarkable.
 
PELVIC VISCERA: The uterus is unremarkable based on CT appearance. Evaluation
for adnexal mass is limited.
 
OSSEOUS STRUCTURES: Unremarkable.
 
IMPRESSION:
1. Small bilateral pleural effusions.
 
2. Large amount of ascites, less than 05/24/2018 CT scan. Patient is status
post paracentesis.
 
3. Mesenteric fat stranding in the upper abdomen, which can be secondary to
ascites.
 
4. Mild hepatomegaly.
 
5. Findings suggestive of noncalcified gallstones. Evaluation for acute
cholecystitis is limited.

## 2018-05-28 NOTE — PN- HOUSESTAFF
Subjective
Follow-up For:
Decompensated alcoholic liver cirrhosis
Hyponatremia
Hypomagnesemia
Borderline macrocytic anemia with unknown etiology
Borderline hypotension
Severe anemia
Right arm ecchymosis due to extravasation of blood during transfusion
History of alcohol abuse currently sober
Subjective:
Patient seen and examined.  Today she appears more confused than before.  Her 
belly is more distended and is painful to touch.  She states that she has pain 
"everywhere".  She has periods of staring off into space.  She seems to have 
decompensated from yesterday.
 
Review of Systems
Constitutional:
Reports: weakness. 
EENTM:
Reports: no symptoms. 
Cardiovascular:
Reports: no symptoms. 
Respiratory:
Reports: no symptoms. 
Gastrointestinal:
Reports: abdominal pain. 
Genitourinary:
Reports: no symptoms. 
Musculoskeletal:
Reports: no symptoms. 
Skin:
Reports: jaundice. 
Neurological/Psychological:
Reports: confusion, depressed. 
Hematologic/Endocrine:
Reports: bruising. 
 
Objective
Last 24 Hrs of Vital Signs/I&O
 Vital Signs
 
 
Date Time Temp Pulse Resp B/P B/P Pulse O2 O2 Flow FiO2
 
     Mean Ox Delivery Rate 
 
 1000 98.6 92 20 104/49     
 
 0800 98.6 92 20 104/49     
 
 0645 98.6 92 18 104/48  97   
 
 2214 98.9 96 20 118/60  98   
 
 1800 98.1 96 20 120/58     
 
 1600 98.1 96 20 120/58     
 
 1550 98.1 96 20   98 Room Air  
 
 1400 98.8 105 18 110/70     
 
 
 Intake & Output
 
 
  1600  0800  0000
 
Intake Total  240 480
 
Output Total   
 
Balance  240 480
 
    
 
Intake, Oral  240 480
 
Patient  123 lb 
 
Weight   
 
 
 
 
Physical Exam
General Appearance: Cooperative, Mild Distress
Skin: No Rashes, No Breakdown, No Significant Lesion
Skin Temp/Moisture Exam: Warm/Dry
Sepsis Skin Exam (color): Jaundiced
HEENT: Atraumatic, PERRLA, EOMI, Mucous Membr. moist/pink
Cardiovascular: Regular Rate, Normal S1, Normal S2, No Murmurs
Lungs: Clear to Auscultation, Normal Air Movement
Abdomen: No Masses, abdomen is firm and distended, tender to even light 
palpation, aundiced with evident veins on abdomen
Neurological: Normal Speech
Extremities: No Clubbing, No Cyanosis, No Edema
Vascular: Normal Pulses, Pulses Symmetrical
Current Medications:
 Current Medications
 
 
  Sig/Reagan Start time  Last
 
Medication Dose Route Stop Time Status Admin
 
Albumin Human 62.5 GM DAILY  0945 DC 
 
  IV  0901  0830
 
Ferrous Sulfate 325 MG BID  2100  
 
  PO   0827
 
Folic Acid 1 MG DAILY  0900 AC 
 
  PO   0827
 
Hydromorphone HCl 0.6 MG Q6P PRN  1030 AC 
 
  IV   
 
Lactulose 20 GM BID  0900 AC 
 
  PO   0827
 
Midodrine 7.5 MG 0800,1200,1600  1200  
 
  PO   0827
 
Nicotine 14 MG DAILY AS NEEDED PRN  0145  
 
  TOP   2324
 
Octreotide Acetate 100 MCG  MO 
 
  SC   1400
 
Omeprazole 40 MG DAILY AC  0700 AC 
 
  PO   
 
Oxycodone HCl 5 MG Q8P PRN  0145  
 
  PO   0933
 
Pantoprazole Sodium 40 MG DAILY  014 MO 
 
  IV   0848
 
Phosphate 250 MG PC AND AT BEDTIME  0945 AC 
 
  PO   
 
Thiamine HCl 100 MG DAILY  09  
 
  PO   0827
 
 
 
 
Last 24 Hrs of Lab/Raghav Results
Last 24 Hrs of Labs/Mics:
 Laboratory Tests
 
18 0817:
Anion Gap 11, Estimated GFR 57  L, BUN/Creatinine Ratio 35.5  H, CBC w Diff NO 
MAN DIFF REQ, RBC 2.13  L, .5  H, MCH 35.0  H, MCHC 34.8, RDW 20.3  H, 
MPV 8.4, Gran % 66.2, Lymphocytes % 18.6  L, Monocytes % 13.7  H, Eosinophils % 
1.2, Basophils % 0.3, Absolute Granulocytes 6.2, Absolute Lymphocytes 1.7, 
Absolute Monocytes 1.3  H, Absolute Eosinophils 0.1, Absolute Basophils 0
 Microbiology
 1114  BODY FLUID: Body Fluid Culture - ORD
1114  BODY FLUID: Gram Stain - ORD
 
 
 
Assessment/Plan
Assessment:
Ms. Sanchez is a 31yo F w/ PMH of alcohol dependence, alcohol liver cirrhosis/
failure with hepatorenal syndrome, from home presented to ER with numerous 
complaints including medication r refill of oxycodone, abdominal/back pain. 
Patient was recent discharge from [facility] on 2018, for treatment of
hyponatremia with fluid restriction, hepatorenal syndrome with IV albumin 
infusion/midodrine/octreotide/pentoxifylline 400 mg p.o. 3 times daily 4 weeks 
starting on 2018.  Patient underwent EGD 2018, and was diagnosed with 
stage I esophageal varices, however not a candidate of beta-blocker due to 
hepatorenal syndrome.  Patient was also treated with acute blood loss was 2 
units of PRBC on 2018.
 
On admission,
Vitals: Afebrile, tachycardia 123, RR 18, /79, 98% on room air
 
-CBC: Mild leukocytosis 12.7, microcytic anemia 7.1/20.4 (baseline 10.1 on ).
-BMP: Hyponatremia 126, K4.2, creatinine 1.1 (baseline 0.1 in 2018).  
Hypomagnesemia 1.5, elevated alk phos 159, albumin 3.2, lipase 1151
-UA/Microbiology:
-Misc: Paracentesis cytology showed no identification of malignancy.
-Ab CT: ascites, however no pancreatitis
 
 
Problem list & Assessment: 
#Decompensated alcoholic liver cirrhosis
#Question of hepatorenal syndrome
#Hyponatremia
#Hypomagnesemia
#Borderline macrocytic anemia with unknown etiology, possibly secondary to 
esophageal varices/duodenitis, hemoglobin below 7
 
 
Hospital Course:
-For concern of obstruction we have done an x-ray abdomen which showed mildly 
dilated air-filled small bowel loops with questionable mild ileus.  Of note, 
last BM .
-Diagnostic para last week showed SAAG over 1.1 indicative of portal 
hypertension and no SBP.  Patient will have paracentesis today, therapeutic and 
diagnostic, gram stain, cell count and differential, culture and sensitivities, 
cytology, ascites albumin, LDH and total protein. 
-Plan is that if labs do show evidence of SBP, we can start ceftriaxone.
-GI doubts hepatorenal syndrome as creatinine has remained stable.  With the 
large volume paracentesis we will give 8 g of albumin for every liter withdrawn.
 We will hold all fluids as we believe this is contributing to her ascites.  Of 
note the patient received on the last shift 650 mL of fluids but strangely, most
of her medications are by mouth at this point.  We will minimize IV medications.
-Patient is nothing by mouth
-Labs done last night showed normal calcium, magnesium, B12, folate, thyroid 
function tests.  Phosphate was low at 2.1 and was repleted today.
-DC'd octreotide yesterday
-PT to mobilize the patient as this may be contributing factor and she is able 
to move, post paracentesis
-Replete electrolytes as needed.
-continue lactulose with plan to give 8 g of albumin for every liter removed 
during paracentesis.
-Patient blood pressure today 104/49.  Patient is on albumin to expand the 
intravascular volume.  
-Patient did have extravasation of first unit of blood transfusion.  So far 
patient has got 2 units of blood transfusion.  Hemoglobin today is 7.4.
-Creatinine today has risen very slightly from 1.0-1.1 which is her baseline.  
The potassium has increased from 4.6-5.2, we will give Kayexalate for levels 
greater than 5.6.

for pain control, however patient's back/abdominal pain was most likely due to 
the increased body weight, ascites causing chronic back pain/muscular strain, 
was negative physical examination findings on spine/imaging/strength or 
neurological deficits.

 
 
 
DVT prophylaxis ALPS
Clear liquid diet was 1000 cc fluid restriction
Full Code
Problem List:
 1. Alcoholic cirrhosis of liver with ascites
 
 2. Symptomatic anemia
 
Pain Ratin
Pain Location:
Widespread but mostly in abdomen
Pain Goal: Pain 4 or less
Pain Plan:
Dilaudid0.6 mg IV every 6
Tomorrow's Labs & Rationales:
CBC and BEP

## 2018-05-28 NOTE — PN- GASTROENTEROLOGY
Assessment/Plan GI
Assessment/Recommendations:
ASSESSMENT:
1.  Change in Clinical Conditions:  Patient had complained of abdominal pain 
prior to LVP.  ? pain to due distension or other etiology.  No evidence of SBP 
on paracentesis.  
2.  Change in Mental Status:  no coags ordered since 5/26.  Has mild 
coagulopathy.  ? other etiology other than hepatic encepathalopathy.
3.  Low Grade Temperature -- no obvious source
 
RECOMMENDATIONS:
1.  Discussed with housestaff (Ada)
2.  Will repeat CBC and coags now
3.  Will panculture
4.  CT Head as well as CT Abdomen and Pelvis.  House staff has been given my 
cell phone number
5.  Lactulose every 4 hours.  If patient is unable to take by mouth will give 
lactulose enemas.  
6.  Per my request, I am to be called with any change in clinical condition.
 
 
 
Subjective
Subjective:
Patient with change in clinical condition.  Had large volume (2000 ml by report)
paracentesis today, there is no procedure note in chart.  Patient had had change
in status prior to procedure and in progress notes had complained of abdominal 
pain.  I am unable to elicit any focal complaints from her although she does 
respond to questions.  However, at this time she just asks to be left alone.
 
Objective
Vital Signs and I&Os
Vital Signs
 
 
Date Time Temp Pulse Resp B/P B/P Pulse O2 O2 Flow FiO2
 
     Mean Ox Delivery Rate 
 
05/28 1527 99.4 84 18 105/59  100 Room Air  
 
05/28 1217 97.8 90 20 110/50     
 
05/28 1000 98.6 92 20 104/49     
 
05/28 0800 98.6 92 20 104/49     
 
05/28 0645 98.6 92 18 104/48  97   
 
05/27 2214 98.9 96 20 118/60  98   
 
05/27 1800 98.1 96 20 120/58     
 
05/27 1600 98.1 96 20 120/58     
 
 
 Intake & Output
 
 
 05/28 1600 05/28 0400 05/27 1600 05/27 0400 05/26 1600 05/26 0400
 
Intake Total 602.5 480 1085 220 470 
 
Output Total 250  350  300 
 
Balance 352.5 480 735 220 170 
 
       
 
Intake, IV 62.5  625 120 50 
 
Intake, Oral 540 480 460 100 420 
 
Number 0  0  1 1
 
Bowel      
 
Movements      
 
Output, Urine 250  350  300 
 
Patient 123 lb  123 lb  117 lb 
 
Weight      
 
Weight     Bed scale 
 
Measurement      
 
Method      
 
 
 
 
Physical Exam
General Appearance: cachetic
Respiratory: normal breath sounds, lungs clear
Cardiovascular: regular rate/rhythm
Abdomen: normal bowel sounds, non-tender
Extremities: no edema
Neurologic/Psychiatric: dramatic change in condition.  ? worsening 
encephalopathy.  patient unable to cooperate with exam
Skin: jaundice
Current Medications:
 Current Medications
 
 
  Sig/Reagan Start time  Last
 
Medication Dose Route Stop Time Status Admin
 
Albumin Human 18 GM ONCE ONE 05/28 1345 DC 
 
  IV 05/28 1346  
 
Albumin Human 62.5 GM DAILY 05/27 0945 VT 05/28
 
  IV 05/28 0901  0830
 
Ferrous Sulfate 325 MG BID 05/24 2100  05/28
 
  PO   0827
 
Folic Acid 1 MG DAILY 05/24 0900  05/28
 
  PO   0827
 
Hydromorphone HCl 0.6 MG Q6P PRN 05/28 1030 AC 
 
  IV   
 
Lactulose 20 GM TID 05/28 2100 DC 
 
  PO   
 
Lactulose 20 GM Q4 05/28 1800 AC 
 
  PO   
 
Lactulose 20 GM BID 05/25 0900 VT 05/28
 
  PO   0827
 
Lidocaine 1 ML .STK-MED ONE 05/28 1218 DC 
 
  ID 05/28 1219  
 
Midodrine 7.5 MG 0800,1200,1600 05/27 1200  05/28
 
  PO   1250
 
Nicotine 14 MG DAILY AS NEEDED PRN 05/24 0145  05/26
 
  TOP   2324
 
Octreotide Acetate 100 MCG TID 05/27 0945 VT 05/27
 
  SC   1400
 
Omeprazole 40 MG DAILY  05/28 0700 AC 
 
  PO   
 
Oxycodone HCl 5 MG Q8P PRN 05/24 0145  05/28
 
  PO   0933
 
Pantoprazole Sodium 40 MG DAILY 05/24 0145 VT 05/27
 
  IV   0848
 
Phosphate 250 MG PC AND AT BEDTIME 05/28 0945  05/28
 
  PO   1358
 
Thiamine HCl 100 MG DAILY 05/24 0900  05/28
 
  PO   0827
 
 
 
 
Results
Pertinent Lab Results:
 Laboratory Tests
 
 
 05/28 05/28 05/28 05/28
 
 1410 1410 1120 1120
 
Chemistry    
 
  Total Bilirubin (0.2 - 1.3 mg/dL)  5.8  H  
 
  Direct Bilirubin (< 0.4 mg/dL)  3.0  H  
 
  AST (14 - 36 U/L)  50  H  
 
  ALT (9 - 52 U/L)  32  
 
  Alkaline Phosphatase (<127 U/L)  86  
 
  Ammonia (9 - 30 umol/L) 32  H   
 
  Total Protein (6.3 - 8.2 g/dL)  6.2  L  
 
  Albumin (3.5 - 5.0 g/dL)  3.5  
 
Hematology    
 
  Lymphocytes (%)    28
 
  % Normal PMNs (%)    2
 
Other Body Source    
 
  Fluid WBC (0 - 5 /CUMM)   238  H 
 
  Fld Mesothelial Cells (%)      
 
  Fld Total RBCs Counted (0 /CUMM)   10  H 
 
  Fluid Glucose (mg/dL)    94
 
  Fluid Total Protein (g/dL)    2.6
 
  Fluid Albumin (g/dL)    1.3
 
  Fluid LDH (U/L)    231
 
  Fluid Amylase (U/L)    108
 
 
 
 
 05/28
 
 0817
 
Chemistry 
 
  Sodium (137 - 145 mmol/L) 131  L
 
  Potassium (3.5 - 5.1 mmol/L) 5.2  H
 
  Chloride (98 - 107 mmol/L) 91  L
 
  Carbon Dioxide (22 - 30 mmol/L) 29
 
  Anion Gap (5 - 16) 11
 
  BUN (7 - 17 mg/dL) 39  H
 
  Creatinine (0.5 - 1.0 mg/dL) 1.1  H
 
  Estimated GFR (>60 ml/min) 57  L
 
  BUN/Creatinine Ratio (7 - 25 %) 35.5  H
 
Hematology 
 
  CBC w Diff NO MAN DIFF REQ
 
  WBC (4.8 - 10.8 /CUMM) 9.4
 
  RBC (4.20 - 5.40 /CUMM) 2.13  L
 
  Hgb (12.0 - 16.0 G/DL) 7.4 *L
 
  Hct (37 - 47 %) 21.4  L
 
  MCV (81.0 - 99.0 FL) 100.5  H
 
  MCH (27.0 - 31.0 PG) 35.0  H
 
  MCHC (33.0 - 37.0 G/DL) 34.8
 
  RDW (11.5 - 14.5 %) 20.3  H
 
  Plt Count (130 - 400 /CUMM) 124  L
 
  MPV (7.4 - 10.4 FL) 8.4
 
  Gran % (42.2 - 75.2 %) 66.2
 
  Lymphocytes % (20.5 - 51.1 %) 18.6  L
 
  Monocytes % (1.7 - 9.3 %) 13.7  H
 
  Eosinophils % (0 - 5 %) 1.2
 
  Basophils % (0.0 - 2.0 %) 0.3
 
  Absolute Granulocytes (1.4 - 6.5 /CUMM) 6.2
 
  Absolute Lymphocytes (1.2 - 3.4 /CUMM) 1.7
 
  Absolute Monocytes (0.10 - 0.60 /CUMM) 1.3  H
 
  Absolute Eosinophils (0.0 - 0.7 /CUMM) 0.1
 
  Absolute Basophils (0.0 - 0.2 /CUMM) 0
 
 
 
 
 05/27
 
 0740
 
Chemistry 
 
  Sodium (137 - 145 mmol/L) 130  L
 
  Potassium (3.5 - 5.1 mmol/L) 4.6
 
  Chloride (98 - 107 mmol/L) 91  L
 
  Carbon Dioxide (22 - 30 mmol/L) 27
 
  Anion Gap (5 - 16) 11
 
  BUN (7 - 17 mg/dL) 39  H
 
  Creatinine (0.5 - 1.0 mg/dL) 1.0
 
  Estimated GFR (>60 ml/min) > 60
 
  BUN/Creatinine Ratio (7 - 25 %) 39.0  H
 
  Calcium (8.4 - 10.2 mg/dL) 9.5
 
  Phosphorus (2.5 - 4.5 mg/dL) 2.1  L
 
  Magnesium (1.6 - 2.3 mg/dL) 1.7
 
  Vitamin B12 (239 - 931 pg/mL) 985  H
 
  Folate (2.76 - 20.0 ng/mL) > 20.0  H
 
  TSH &T3 &Free T4 Intrp (0.270 - 4.20 uIU/mL) 2.080
 
Hematology 
 
  CBC w Diff NO MAN DIFF REQ
 
  WBC (4.8 - 10.8 /CUMM) 11.8  H
 
  RBC (4.20 - 5.40 /CUMM) 2.22  L
 
  Hgb (12.0 - 16.0 G/DL) 7.7  L
 
  Hct (37 - 47 %) 22.1  L
 
  MCV (81.0 - 99.0 FL) 99.9  H
 
  MCH (27.0 - 31.0 PG) 34.7  H
 
  MCHC (33.0 - 37.0 G/DL) 34.8
 
  RDW (11.5 - 14.5 %) 20.6  H
 
  Plt Count (130 - 400 /CUMM) 132
 
  MPV (7.4 - 10.4 FL) 8.4
 
  Gran % (42.2 - 75.2 %) 69.6
 
  Lymphocytes % (20.5 - 51.1 %) 17.1  L
 
  Monocytes % (1.7 - 9.3 %) 12.8  H
 
  Eosinophils % (0 - 5 %) 0.2
 
  Basophils % (0.0 - 2.0 %) 0.3
 
  Absolute Granulocytes (1.4 - 6.5 /CUMM) 8.2  H
 
  Absolute Lymphocytes (1.2 - 3.4 /CUMM) 2.0
 
  Absolute Monocytes (0.10 - 0.60 /CUMM) 1.5  H
 
  Absolute Eosinophils (0.0 - 0.7 /CUMM) 0
 
  Absolute Basophils (0.0 - 0.2 /CUMM) 0
 
 
 
 
 05/26 05/26 05/26 2250 2250 1832
 
Other Body Source   
 
  Fluid WBC   Cancelled
 
  Fld Total RBCs Counted   Cancelled
 
Urines   
 
  Urine Color (YEL,AMB,STR) YEL  
 
  Urine Clarity (CLEAR) HAZY  H  
 
  Urine pH (5.0 - 8.0) 6.0  
 
  Ur Specific Gravity (1.001 - 1.035) <= 1.005  
 
  Urine Protein (NEG,<30 MG/DL) NEG  
 
  Urine Ketones (NEG) NEG  
 
  Urine Nitrite (NEG) NEG  
 
  Urine Bilirubin (NEG) NEG  
 
  Urine Urobilinogen (0.1  -  1.0 EU/dl) 1.0  
 
  Ur Leukocyte Esterase (NEG) TRACE  H  
 
  Ur Microscopic SEDIMENT EXAMINED  
 
  Urine WBC (0 - 2 /HPF) 1-3  H  
 
  Ur Epithelial Cells (NONE,FEW) FEW  
 
  Urine Bacteria (NEG/NONE) FEW  H  
 
  Urine Hemoglobin (NEG) NEG  
 
  Ur Random Creatinine (mg/dL)  95.4 
 
  Ur Random Sodium (30 - 90 mmol/L)  < 5  L 
 
  Ur Random Potassium (mmol/L)  16.5 
 
  Fraction Sodium Excret (<1% %)  0.0 
 
  Urine Glucose (N MG/DL) NEG  
 
 
 
 
 05/26 05/26
 
 1832 1055
 
Chemistry  
 
  Sodium (137 - 145 mmol/L)  128  L
 
  Potassium (3.5 - 5.1 mmol/L)  4.4
 
  Chloride (98 - 107 mmol/L)  90  L
 
  Carbon Dioxide (22 - 30 mmol/L)  27
 
  Anion Gap (5 - 16)  11
 
  BUN (7 - 17 mg/dL)  44  H
 
  Creatinine (0.5 - 1.0 mg/dL)  1.1  H
 
  Estimated GFR (>60 ml/min)  57  L
 
  BUN/Creatinine Ratio (7 - 25 %)  40.0  H
 
Hematology  
 
  CBC w Diff  NO MAN DIFF REQ
 
  WBC (4.8 - 10.8 /CUMM)  12.2  H
 
  RBC (4.20 - 5.40 /CUMM)  2.06  L
 
  Hgb (12.0 - 16.0 G/DL)  7.0 *L
 
  Hct (37 - 47 %)  20.7  L
 
  MCV (81.0 - 99.0 FL)  100.2  H
 
  MCH (27.0 - 31.0 PG)  34.1  H
 
  MCHC (33.0 - 37.0 G/DL)  34.1
 
  RDW (11.5 - 14.5 %)  21.1  H
 
  Plt Count (130 - 400 /CUMM)  116  L
 
  MPV (7.4 - 10.4 FL)  8.4
 
  Gran % (42.2 - 75.2 %)  72.6
 
  Lymphocytes % (20.5 - 51.1 %)  11.1  L
 
  Monocytes % (1.7 - 9.3 %)  16.2  H
 
  Eosinophils % (0 - 5 %)  0
 
  Basophils % (0.0 - 2.0 %)  0.1
 
  Absolute Granulocytes (1.4 - 6.5 /CUMM)  8.8  H
 
  Absolute Lymphocytes (1.2 - 3.4 /CUMM)  1.3
 
  Absolute Monocytes (0.10 - 0.60 /CUMM)  2.0  H
 
  Absolute Eosinophils (0.0 - 0.7 /CUMM)  0
 
  Absolute Basophils (0.0 - 0.2 /CUMM)  0
 
Other Body Source  
 
  Fluid Glucose Cancelled 
 
  Fluid Total Protein Cancelled 
 
  Fluid Albumin Cancelled 
 
  Fluid LDH Cancelled 
 
  Fluid Amylase Cancelled 
 
 
 
 
 05/25 05/25 2015 2009
 
Hematology  
 
  CBC w Diff NO MAN DIFF REQ 
 
  WBC (4.8 - 10.8 /CUMM) 7.6 
 
  RBC (4.20 - 5.40 /CUMM) 2.21  L 
 
  Hgb (12.0 - 16.0 G/DL) 7.5  L 
 
  Hct (37 - 47 %) 21.7  L 
 
  MCV (81.0 - 99.0 FL) 98.2 
 
  MCH (27.0 - 31.0 PG) 33.8  H 
 
  MCHC (33.0 - 37.0 G/DL) 34.4 
 
  RDW (11.5 - 14.5 %) 21.0  H 
 
  Plt Count (130 - 400 /CUMM) 116  L 
 
  MPV (7.4 - 10.4 FL) 7.8 
 
  Gran % (42.2 - 75.2 %) 90.2  H 
 
  Lymphocytes % (20.5 - 51.1 %) 6.9  L 
 
  Monocytes % (1.7 - 9.3 %) 2.8 
 
  Eosinophils % (0 - 5 %) 0.1 
 
  Basophils % (0.0 - 2.0 %) 0 
 
  Absolute Granulocytes (1.4 - 6.5 /CUMM) 6.9  H 
 
  Absolute Lymphocytes (1.2 - 3.4 /CUMM) 0.5  L 
 
  Absolute Monocytes (0.10 - 0.60 /CUMM) 0.2 
 
  Absolute Eosinophils (0.0 - 0.7 /CUMM) 0 
 
  Absolute Basophils (0.0 - 0.2 /CUMM) 0 
 
Urines  
 
  Ur Random Creatinine (mg/dL)  99.4
 
  Ur Random Sodium (30 - 90 mmol/L)  < 5  L
 
  Ur Random Potassium (mmol/L)  18.7
 
  Fraction Sodium Excret (<1% %)  0.0

## 2018-05-29 VITALS — SYSTOLIC BLOOD PRESSURE: 108 MMHG | DIASTOLIC BLOOD PRESSURE: 50 MMHG

## 2018-05-29 VITALS — DIASTOLIC BLOOD PRESSURE: 49 MMHG | SYSTOLIC BLOOD PRESSURE: 98 MMHG

## 2018-05-29 VITALS — DIASTOLIC BLOOD PRESSURE: 60 MMHG | SYSTOLIC BLOOD PRESSURE: 120 MMHG

## 2018-05-29 VITALS — SYSTOLIC BLOOD PRESSURE: 100 MMHG | DIASTOLIC BLOOD PRESSURE: 62 MMHG

## 2018-05-29 LAB
ABSOLUTE GRANULOCYTE CT: 7.3 /CUMM (ref 1.4–6.5)
BASOPHILS # BLD: 0 /CUMM (ref 0–0.2)
BASOPHILS NFR BLD: 0.2 % (ref 0–2)
EOSINOPHIL # BLD: 0 /CUMM (ref 0–0.7)
EOSINOPHIL NFR BLD: 0.4 % (ref 0–5)
ERYTHROCYTE [DISTWIDTH] IN BLOOD BY AUTOMATED COUNT: 20.7 % (ref 11.5–14.5)
GRANULOCYTES NFR BLD: 69.8 % (ref 42.2–75.2)
HCT VFR BLD CALC: 24.3 % (ref 37–47)
LYMPHOCYTES # BLD: 1.6 /CUMM (ref 1.2–3.4)
MCH RBC QN AUTO: 34.7 PG (ref 27–31)
MCHC RBC AUTO-ENTMCNC: 34.2 G/DL (ref 33–37)
MCV RBC AUTO: 101.7 FL (ref 81–99)
MONOCYTES # BLD: 1.5 /CUMM (ref 0.1–0.6)
PLATELET # BLD: 145 /CUMM (ref 130–400)
PMV BLD AUTO: 8.3 FL (ref 7.4–10.4)
RED BLOOD CELL CT: 2.39 /CUMM (ref 4.2–5.4)
WBC # BLD AUTO: 10.5 /CUMM (ref 4.8–10.8)

## 2018-05-29 NOTE — ULTRASOUND REPORT
CLINICAL HISTORY:
This patient is a 32 years old female with ascites found on physical exam,
who is referred to Interventional Radiology for ultrasound-guided
paracentesis.
 
PROCEDURE:
Ultrasound-guided paracentesis.
 
PHYSICIANS:
Dr. Kayleigh Dixon
 
MEDICATIONS:
5 mL of 1% lidocaine SQ.
 
COMPLICATIONS: None
ESTIMATED BLOOD LOSS: <5 mL
SPECIMENS: None
IMPLANT: None.
 
SITE MARKING:
As part of the preprocedure verification policy, a site marking procedure was
initiated. Due to the nature the procedure, the insertion site could not be
predetermined thus invoking the policy of exemption to site laterality and
marking. Insertion site marking was performed in the procedure room in
conjunction with imaging confirmation.
 
PROCEDURE NOTE:
Informed consent was obtained from the patient prior to the procedure. During
this process, the procedure and potential alternatives were explained along
with the intended outcome and benefits. The risks of the procedure, including
the possibility of an unsuccessful procedure, as well as the risk of not
doing the procedure, were discussed. The patient was given the opportunity to
ask questions regarding the procedure and appeared competent to make
decisions. A signed consent form documenting this discussion was placed in
the medical record. A time-out procedure was performed.
 
Appropriate preprocedure medical history and imaging studies were reviewed.
The patient was brought to the ultrasound room and placed in the supine
position. A time-out procedure was performed. Ultrasound images of the
abdomen were obtained to localize a large collection of ascites. Images were
permanently saved to the record.
 
An area of the right lower quadrant was prepped and draped in the standard
sterile fashion. All elements of maximal sterile barrier technique followed
including use of cap, mask, sterile gown, sterile gloves, a sterile full body
drape and hand hygiene. Also followed skin preparation with 2% chlorhexidine
for cutaneous antisepsis, and sterile ultrasound preparation with sterile gel
and probe cover when applicable. 10 mL of 1% lidocaine was used to obtain
local anesthesia of the skin and deeper tissues. A standard small-bore needle
was introduced to sample fluid and demonstrated a safe access route. There
was no evidence of traversing adjacent organs or vascular structures. A 6-Fr
Safe-T-Centesis closed needle/catheter system was utilized for access.  7200
mL of clear bright yellow fluid was aspirated before drainage ceased. The
catheter was removed and sterile dressing applied.
 
The patient tolerated the procedure well without evidence of complications.
 
FINDINGS:
Large simple abdominal ascites as detailed above.
 
IMPRESSION:
Successful ultrasound-guided therapeutic and diagnostic paracentesis.
 
PLAN:
The patient was stable after the procedure. The patient will be transferred
to the floor.

## 2018-05-29 NOTE — PN- GASTROENTEROLOGY
Assessment/Plan GI
Assessment/Recommendations:
ASSESSMENT:
1.  Cirrhosis
2.  Ascites
3.  Question hepatorenal syndrome, type II
4.  Known esophageal varices
5.  Hepatic encephalopathy
 
RECOMMENDATIONS:
1.  NPO after midnight for EGD to evaluate esophageal varices
2.  Discontinue all narcotics as these may have contributed to her change in 
mental status
3.  Start rifaximin start 550 mg twice a day
4.  Continue lactulose
5.  Strict I's and O's as well as daily weights
6.  Would continue Midrodrine as well as octreotide at a dose of 100 mg TID SQ. 
Would also give albumin now at a dose of 50 mg IV daily for 3 days and then 
decrease to 25 mg daily.  
7.  Would like to try to repeat paracentesis if patient continues to make 
improvements in creatinine.
 
 
 
Subjective
Subjective:
Patient is alert this morning and back to baseline.  No complaints of nausea 
vomiting or abdominal pain.  CT of the head, abdomen and pelvis were done last 
night given an acute change in mental status.  They were unremarkable.  There is
no explanation for her acute change in status.
 
Review of Systems
Constitutional:
Denies: fever, malaise, weakness. 
Respiratory:
Reports: no symptoms. 
Gastrointestinal:
Reports: distention.  Denies: abdominal pain, melena, bloody stool, vomiting. 
 
Objective
Vital Signs and I&Os
Vital Signs
 
 
Date Time Temp Pulse Resp B/P B/P Pulse O2 O2 Flow FiO2
 
     Mean Ox Delivery Rate 
 
05/29 0902  98  108/50     
 
05/29 0802  99       
 
05/29 0627 97.6 107 20 120/60  97 Room Air  
 
05/28 2236 97.9 91 18 106/60  96 Room Air  
 
05/28 1800 97.3 90 20 110/50     
 
05/28 1600 99.4 84 18 105/59     
 
05/28 1527 99.4 84 18 105/59  100 Room Air  
 
05/28 1400 97.3 90 20 110/50     
 
 
 Intake & Output
 
 
 05/29 1600 05/29 0400 05/28 1600 05/28 0400 05/27 1600 05/27 0400
 
Intake Total 300 200 602.5 480 1085 220
 
Output Total 325 0 250  350 
 
Balance -25 200 352.5 480 735 220
 
       
 
Intake, IV   62.5  625 120
 
Intake, Oral 300 200 540 480 460 100
 
Number   0  0 
 
Bowel      
 
Movements      
 
Output, Urine 325 0 250  350 
 
Patient   123 lb  123 lb 
 
Weight      
 
 
 
 
Physical Exam
General Appearance: alert, awake, comfortable, cachetic
Respiratory: lungs clear
Cardiovascular: regular rate/rhythm
Abdomen: normal bowel sounds, soft, distention, NO TENDERNESS, REBOUND, OR 
GUARDING
Extremities: no edema
Neurologic/Psychiatric: alert, oriented x 3, normal mood/affect
Skin: intact, warm/dry, jaundice
Current Medications:
 Current Medications
 
 
  Sig/Reagan Start time  Last
 
Medication Dose Route Stop Time Status Admin
 
Albumin Human 18 GM ONCE ONE 05/28 1345 DC 
 
  IV 05/28 1346  
 
Ferrous Sulfate 325 MG BID 05/24 2100 AC 05/29
 
  PO   0901
 
Folic Acid 1 MG DAILY 05/24 0900 AC 05/29
 
  PO   0901
 
Hydromorphone HCl 0.6 MG Q6P PRN 05/28 1030 AC 05/29
 
  IV   0231
 
Lactulose 20 GM TID 05/28 2100 DC 
 
  PO   
 
Lactulose 20 GM Q4 05/28 1800 AC 05/29
 
  PO   0901
 
Lactulose 20 GM BID 05/25 0900 DC 05/28
 
  PO   0827
 
Midodrine 7.5 MG 0800,1200,1600 05/27 1200 AC 05/29
 
  PO   1244
 
Nicotine 14 MG DAILY AS NEEDED PRN 05/24 0145  05/26
 
  TOP   2324
 
Omeprazole 40 MG DAILY AC 05/28 0700 AC 05/29
 
  PO   0543
 
Oxycodone HCl 5 MG Q8P PRN 05/24 0145 AC 05/28
 
  PO   0933
 
Phosphate 250 MG PC AND AT BEDTIME 05/28 0945  05/29
 
  PO   1244
 
Thiamine HCl 100 MG DAILY 05/24 0900 AC 05/29
 
  PO   0901
 
 
 
 
Results
Pertinent Lab Results:
 Laboratory Tests
 
 
 05/29 05/29
 
 0700 0600
 
Chemistry  
 
  Sodium (137 - 145 mmol/L)  137
 
  Potassium (3.5 - 5.1 mmol/L)  4.1
 
  Chloride (98 - 107 mmol/L)  100
 
  Carbon Dioxide (22 - 30 mmol/L)  25
 
  Anion Gap (5 - 16)  12
 
  BUN (7 - 17 mg/dL)  32  H
 
  Creatinine (0.5 - 1.0 mg/dL)  0.9
 
  Estimated GFR (>60 ml/min)  > 60
 
  BUN/Creatinine Ratio (7 - 25 %)  35.6  H
 
Hematology  
 
  CBC w Diff NO MAN DIFF REQ 
 
  WBC (4.8 - 10.8 /CUMM) 10.5 
 
  RBC (4.20 - 5.40 /CUMM) 2.39  L 
 
  Hgb (12.0 - 16.0 G/DL) 8.3  L 
 
  Hct (37 - 47 %) 24.3  L 
 
  MCV (81.0 - 99.0 FL) 101.7  H 
 
  MCH (27.0 - 31.0 PG) 34.7  H 
 
  MCHC (33.0 - 37.0 G/DL) 34.2 
 
  RDW (11.5 - 14.5 %) 20.7  H 
 
  Plt Count (130 - 400 /CUMM) 145 
 
  MPV (7.4 - 10.4 FL) 8.3 
 
  Gran % (42.2 - 75.2 %) 69.8 
 
  Lymphocytes % (20.5 - 51.1 %) 15.6  L 
 
  Monocytes % (1.7 - 9.3 %) 14.0  H 
 
  Eosinophils % (0 - 5 %) 0.4 
 
  Basophils % (0.0 - 2.0 %) 0.2 
 
  Absolute Granulocytes (1.4 - 6.5 /CUMM) 7.3  H 
 
  Absolute Lymphocytes (1.2 - 3.4 /CUMM) 1.6 
 
  Absolute Monocytes (0.10 - 0.60 /CUMM) 1.5  H 
 
  Absolute Eosinophils (0.0 - 0.7 /CUMM) 0 
 
  Absolute Basophils (0.0 - 0.2 /CUMM) 0 
 
 
 
 
 05/28 05/28 05/28 05/28
 
 1715 1410 1410 1120
 
Chemistry    
 
  Total Bilirubin (0.2 - 1.3 mg/dL)   5.8  H 
 
  Direct Bilirubin (< 0.4 mg/dL)   3.0  H 
 
  AST (14 - 36 U/L)   50  H 
 
  ALT (9 - 52 U/L)   32 
 
  Alkaline Phosphatase (<127 U/L)   86 
 
  Ammonia (9 - 30 umol/L)  32  H  
 
  Total Protein (6.3 - 8.2 g/dL)   6.2  L 
 
  Albumin (3.5 - 5.0 g/dL)   3.5 
 
Coagulation    
 
  PT (9.4 - 12.5 SEC) 19.9  H   
 
  INR (0.90 - 1.19) 1.81  H   
 
  APTT (25 - 37 SEC) 44  H   
 
Hematology    
 
  CBC w Diff NO MAN DIFF REQ   
 
  WBC (4.8 - 10.8 /CUMM) 11.4  H   
 
  RBC (4.20 - 5.40 /CUMM) 2.31  L   
 
  Hgb (12.0 - 16.0 G/DL) 8.1  L   
 
  Hct (37 - 47 %) 23.1  L   
 
  MCV (81.0 - 99.0 FL) 100.3  H   
 
  MCH (27.0 - 31.0 PG) 34.9  H   
 
  MCHC (33.0 - 37.0 G/DL) 34.8   
 
  RDW (11.5 - 14.5 %) 20.1  H   
 
  Plt Count (130 - 400 /CUMM) 134   
 
  MPV (7.4 - 10.4 FL) 8.5   
 
  Gran % (42.2 - 75.2 %) 73.7   
 
  Lymphocytes % (20.5 - 51.1 %) 12.0  L   
 
  Monocytes % (1.7 - 9.3 %) 13.4  H   
 
  Eosinophils % (0 - 5 %) 0.7   
 
  Basophils % (0.0 - 2.0 %) 0.2   
 
  Absolute Granulocytes (1.4 - 6.5 /CUMM) 8.4  H   
 
  Absolute Lymphocytes (1.2 - 3.4 /CUMM) 1.4   
 
  Absolute Monocytes (0.10 - 0.60 /CUMM) 1.5  H   
 
  Absolute Eosinophils (0.0 - 0.7 /CUMM) 0.1   
 
  Absolute Basophils (0.0 - 0.2 /CUMM) 0   
 
Other Body Source    
 
  Fluid WBC (0 - 5 /CUMM)    238  H
 
  Fld Mesothelial Cells (%)      
 
  Fld Total RBCs Counted (0 /CUMM)    10  H
 
 
 
 
 05/28 05/28
 
 1120 0817
 
Chemistry  
 
  Sodium (137 - 145 mmol/L)  131  L
 
  Potassium (3.5 - 5.1 mmol/L)  5.2  H
 
  Chloride (98 - 107 mmol/L)  91  L
 
  Carbon Dioxide (22 - 30 mmol/L)  29
 
  Anion Gap (5 - 16)  11
 
  BUN (7 - 17 mg/dL)  39  H
 
  Creatinine (0.5 - 1.0 mg/dL)  1.1  H
 
  Estimated GFR (>60 ml/min)  57  L
 
  BUN/Creatinine Ratio (7 - 25 %)  35.5  H
 
Hematology  
 
  CBC w Diff  NO MAN DIFF REQ
 
  WBC (4.8 - 10.8 /CUMM)  9.4
 
  RBC (4.20 - 5.40 /CUMM)  2.13  L
 
  Hgb (12.0 - 16.0 G/DL)  7.4 *L
 
  Hct (37 - 47 %)  21.4  L
 
  MCV (81.0 - 99.0 FL)  100.5  H
 
  MCH (27.0 - 31.0 PG)  35.0  H
 
  MCHC (33.0 - 37.0 G/DL)  34.8
 
  RDW (11.5 - 14.5 %)  20.3  H
 
  Plt Count (130 - 400 /CUMM)  124  L
 
  MPV (7.4 - 10.4 FL)  8.4
 
  Gran % (42.2 - 75.2 %)  66.2
 
  Lymphocytes % (20.5 - 51.1 %)  18.6  L
 
  Monocytes % (1.7 - 9.3 %)  13.7  H
 
  Eosinophils % (0 - 5 %)  1.2
 
  Basophils % (0.0 - 2.0 %)  0.3
 
  Absolute Granulocytes (1.4 - 6.5 /CUMM)  6.2
 
  Absolute Lymphocytes (1.2 - 3.4 /CUMM)  1.7
 
  Lymphocytes (%) 28 
 
  Absolute Monocytes (0.10 - 0.60 /CUMM)  1.3  H
 
  Absolute Eosinophils (0.0 - 0.7 /CUMM)  0.1
 
  Absolute Basophils (0.0 - 0.2 /CUMM)  0
 
  % Normal PMNs (%) 2 
 
Other Body Source  
 
  Fluid Glucose (mg/dL) 94 
 
  Fluid Total Protein (g/dL) 2.6 
 
  Fluid Albumin (g/dL) 1.3 
 
  Fluid LDH (U/L) 231 
 
  Fluid Amylase (U/L) 108 
 
 
 
 
 05/27
 
 0740
 
Chemistry 
 
  Sodium (137 - 145 mmol/L) 130  L
 
  Potassium (3.5 - 5.1 mmol/L) 4.6
 
  Chloride (98 - 107 mmol/L) 91  L
 
  Carbon Dioxide (22 - 30 mmol/L) 27
 
  Anion Gap (5 - 16) 11
 
  BUN (7 - 17 mg/dL) 39  H
 
  Creatinine (0.5 - 1.0 mg/dL) 1.0
 
  Estimated GFR (>60 ml/min) > 60
 
  BUN/Creatinine Ratio (7 - 25 %) 39.0  H
 
  Calcium (8.4 - 10.2 mg/dL) 9.5
 
  Phosphorus (2.5 - 4.5 mg/dL) 2.1  L
 
  Magnesium (1.6 - 2.3 mg/dL) 1.7
 
  Vitamin B12 (239 - 931 pg/mL) 985  H
 
  Folate (2.76 - 20.0 ng/mL) > 20.0  H
 
  TSH &T3 &Free T4 Intrp (0.270 - 4.20 uIU/mL) 2.080
 
Hematology 
 
  CBC w Diff NO MAN DIFF REQ
 
  WBC (4.8 - 10.8 /CUMM) 11.8  H
 
  RBC (4.20 - 5.40 /CUMM) 2.22  L
 
  Hgb (12.0 - 16.0 G/DL) 7.7  L
 
  Hct (37 - 47 %) 22.1  L
 
  MCV (81.0 - 99.0 FL) 99.9  H
 
  MCH (27.0 - 31.0 PG) 34.7  H
 
  MCHC (33.0 - 37.0 G/DL) 34.8
 
  RDW (11.5 - 14.5 %) 20.6  H
 
  Plt Count (130 - 400 /CUMM) 132
 
  MPV (7.4 - 10.4 FL) 8.4
 
  Gran % (42.2 - 75.2 %) 69.6
 
  Lymphocytes % (20.5 - 51.1 %) 17.1  L
 
  Monocytes % (1.7 - 9.3 %) 12.8  H
 
  Eosinophils % (0 - 5 %) 0.2
 
  Basophils % (0.0 - 2.0 %) 0.3
 
  Absolute Granulocytes (1.4 - 6.5 /CUMM) 8.2  H
 
  Absolute Lymphocytes (1.2 - 3.4 /CUMM) 2.0
 
  Absolute Monocytes (0.10 - 0.60 /CUMM) 1.5  H
 
  Absolute Eosinophils (0.0 - 0.7 /CUMM) 0
 
  Absolute Basophils (0.0 - 0.2 /CUMM) 0
 
 
 
 
 05/26 05/26 05/26
 
 2250 2250 1832
 
Other Body Source   
 
  Fluid WBC   Cancelled
 
  Fld Total RBCs Counted   Cancelled
 
Urines   
 
  Urine Color (YEL,AMB,STR) YEL  
 
  Urine Clarity (CLEAR) HAZY  H  
 
  Urine pH (5.0 - 8.0) 6.0  
 
  Ur Specific Gravity (1.001 - 1.035) <= 1.005  
 
  Urine Protein (NEG,<30 MG/DL) NEG  
 
  Urine Ketones (NEG) NEG  
 
  Urine Nitrite (NEG) NEG  
 
  Urine Bilirubin (NEG) NEG  
 
  Urine Urobilinogen (0.1  -  1.0 EU/dl) 1.0  
 
  Ur Leukocyte Esterase (NEG) TRACE  H  
 
  Ur Microscopic SEDIMENT EXAMINED  
 
  Urine WBC (0 - 2 /HPF) 1-3  H  
 
  Ur Epithelial Cells (NONE,FEW) FEW  
 
  Urine Bacteria (NEG/NONE) FEW  H  
 
  Urine Hemoglobin (NEG) NEG  
 
  Ur Random Creatinine (mg/dL)  95.4 
 
  Ur Random Sodium (30 - 90 mmol/L)  < 5  L 
 
  Ur Random Potassium (mmol/L)  16.5 
 
  Fraction Sodium Excret (<1% %)  0.0 
 
  Urine Glucose (N MG/DL) NEG  
 
 
 
 
 05/26
 
 5842
 
Other Body Source 
 
  Fluid Glucose Cancelled
 
  Fluid Total Protein Cancelled
 
  Fluid Albumin Cancelled
 
  Fluid LDH Cancelled
 
  Fluid Amylase Cancelled

## 2018-05-29 NOTE — PN- HOUSESTAFF
**See Addendum**
Agnes CORNEJO,Sendy 18 0752:
Subjective
Follow-up For:
Decompensated alcoholic liver cirrhosis
Hyponatremia
Hypomagnesemia
Borderline macrocytic anemia with unknown etiology
Borderline hypotension
Severe anemia
Right arm ecchymosis due to extravasation of blood during transfusion
History of alcohol abuse currently sober
Subjective:
Patient appears more alert than yesterday, interactive with staff but remains 
confused to time and place.  Appears to have no insight into confusion.  Patient
has been having diarrhea today.  She also continues to feel nauseous.  Notes 
less pain in her abdomen sp paracentesis with removal of 2.2L yesterday.  
Patient denies any chest pain, shortness of breath, diaphoresis, subjective 
fever.
 
Review of Systems
Constitutional:
Reports: no symptoms. 
EENTM:
Reports: icterus. 
Cardiovascular:
Reports: no symptoms. 
Respiratory:
Reports: no symptoms. 
Gastrointestinal:
Reports: see HPI, abdominal pain, diarrhea, distention. 
Genitourinary:
Reports: no symptoms. 
Musculoskeletal:
Reports: no symptoms. 
Skin:
Reports: jaundice. 
Neurological/Psychological:
Reports: confusion. 
Hematologic/Endocrine:
Reports: bruising. 
 
Objective
Last 24 Hrs of Vital Signs/I&O
 Vital Signs
 
 
Date Time Temp Pulse Resp B/P B/P Pulse O2 O2 Flow FiO2
 
     Mean Ox Delivery Rate 
 
 1353 99.8 106 20 100/62  99 Room Air  
 
 0902  98  108/50     
 
 0802  99       
 
 0627 97.6 107 20 120/60  97 Room Air  
 
 2236 97.9 91 18 106/60  96 Room Air  
 
 
 Intake & Output
 
 
  1600  0800  0000
 
Intake Total 720 300 200
 
Output Total 500 325 0
 
Balance 220 -25 200
 
    
 
Intake, Oral 720 300 200
 
Number 4  
 
Bowel   
 
Movements   
 
Output, Urine 500 325 0
 
 
 
 
Physical Exam
General Appearance: Alert, Cooperative, No Acute Distress
Skin: No Rashes, No Breakdown, right arm hematoma where blood transfusion was 
given, extravasation
Skin Temp/Moisture Exam: Warm/Dry
Sepsis Skin Exam (color): Normal for Ethnicity
HEENT: Atraumatic, PERRLA, EOMI
Cardiovascular: Regular Rate, Normal S1, Normal S2, No Murmurs
Lungs: Clear to Auscultation, Normal Air Movement
Abdomen: Normal Bowel Sounds, No Masses, distended abdomen albeit less than 
before, pain on mild palpation, liver span increased, no palpable spleen
Neurological: Normal Speech
Extremities: No Clubbing, No Cyanosis, No Edema
Current Medications:
 Current Medications
 
 
  Sig/Reagan Start time  Last
 
Medication Dose Route Stop Time Status Admin
 
Albumin Human 50 GM DAILY  09  
 
    
 
Ferrous Sulfate 325 MG BID  2100  
 
  PO   2033
 
Folic Acid 1 MG DAILY  0900 AC 
 
  PO   09
 
Hydromorphone HCl 0.6 MG Q6P PRN  1030 GA 
 
  IV   0231
 
Lactulose 20 GM Q4  1800  
 
  PO   0901
 
Midodrine 7.5 MG 0800,1200,1600  1200 AC 
 
  PO   1637
 
Nicotine 14 MG DAILY AS NEEDED PRN  0145  
 
  TOP   2324
 
Octreotide Acetate 100 MCG TID  2100  
 
  SC   2033
 
Omeprazole 40 MG DAILY AC  0700 AC 
 
  PO   0543
 
Oxycodone HCl 5 MG Q8P PRN  0145 GA 
 
  PO   0933
 
Patient Medication  1 ED ONE ONE  1830 GA 
 
Teaching  ED  1831  
 
Phosphate 250 MG PC AND AT BEDTIME  0945  
 
  PO   
 
Rifaximin 550 MG BID  1402  
 
  PO   
 
Thiamine HCl 100 MG DAILY  0900  
 
  PO   0901
 
Trimethobenzamide HCl 200 MG TID PRN  1330 AC 
 
  IM   
 
 
 
 
Last 24 Hrs of Lab/Raghav Results
Last 24 Hrs of Labs/Mics:
 Laboratory Tests
 
18 2000:
Urine Pregnancy Test NEGATIVE
 
18 0700:
CBC w Diff NO MAN DIFF REQ, RBC 2.39  L, .7  H, MCH 34.7  H, MCHC 34.2, 
RDW 20.7  H, MPV 8.3, Gran % 69.8, Lymphocytes % 15.6  L, Monocytes % 14.0  H, 
Eosinophils % 0.4, Basophils % 0.2, Absolute Granulocytes 7.3  H, Absolute 
Lymphocytes 1.6, Absolute Monocytes 1.5  H, Absolute Eosinophils 0, Absolute 
Basophils 0
 
18 0600:
Anion Gap 12, Estimated GFR > 60, BUN/Creatinine Ratio 35.6  H
 
 
Assessment/Plan
Assessment:
Ms. Sanchez is a 33yo F w/ PMH of alcohol dependence, alcohol liver cirrhosis/
failure with hepatorenal syndrome, from home presented to ER with numerous 
complaints including medication r refill of oxycodone, abdominal/back pain. 
Patient was recent discharge from [facility] on 2018, for treatment of
hyponatremia with fluid restriction, hepatorenal syndrome with IV albumin 
infusion/midodrine/octreotide/pentoxifylline 400 mg p.o. 3 times daily 4 weeks 
starting on 2018.  Patient underwent EGD 2018, and was diagnosed with 
stage I esophageal varices, however not a candidate of beta-blocker due to 
hepatorenal syndrome.  Patient was also treated with acute blood loss was 2 
units of PRBC on 2018.
 
On admission,
Vitals: Afebrile, tachycardia 123, RR 18, /79, 98% on room air
 
-CBC: Mild leukocytosis 12.7, microcytic anemia 7.1/20.4 (baseline 10.1 on ).
-BMP: Hyponatremia 126, K4.2, creatinine 1.1 (baseline 0.1 in 2018).  
Hypomagnesemia 1.5, elevated alk phos 159, albumin 3.2, lipase 1151
-UA/Microbiology:
-Misc: Paracentesis cytology showed no identification of malignancy.
-Ab CT: ascites, however no pancreatitis
 
 
Problem list & Assessment: 
#Decompensated alcoholic liver cirrhosis
#Question of hepatorenal syndrome
#Hyponatremia
#Hypomagnesemia
#Borderline macrocytic anemia with unknown etiology, possibly secondary to 
esophageal varices/duodenitis, hemoglobin below 7
 
 
Hospital Course:
-For concern of obstruction we have done an x-ray abdomen which showed mildly 
dilated air-filled small bowel loops with questionable mild ileus.  Of note, 
last BM , but today patient had diarrhea.  Holding parameter for lactulose 
is diarrhea,current dose is 20 q4h
-Diagnostic para last week showed SAAG over 1.1 indicative of portal 
hypertension and no SBP.  Patient had paracentesis yesterday, therapeutic and 
diagnostic, gram stain, cell count and differential, culture and sensitivities, 
cytology, ascites albumin, LDH and total protein done. Again SAAG greated than 
1.1, no evidence of SBP.  follow body fluid culture.
-Ammonia level lower at 32.  If high we were going to increase lactulose.
-GI doubts hepatorenal syndrome as creatinine has remained stable.  With the 
large volume paracentesis we gave 8 g of albumin for every liter withdrawn.  We 
held all fluids as we believe this is contributing to her ascites.  We also held
fluids as patient was hyponatremic which has resolved. We will minimize IV 
medications but will not restrict PO fluids.  Patient is no longer hyponatremic.
 
-Labs done last night showed normal calcium, magnesium, B12, folate, thyroid 
function tests.  Phosphate was low at 2.1 and was repleted today.
-As per GI today we will restart octreotide 100mg tid SC, continue albumin at 50
grams for next 3 days with plan to decrease to 25 afterwards, continue midodrine
, start rifaxamin 550mg bid.  we deon stop narcotics as this could be causing 
confusion as well.  Patient is to be NPO for EGD tomorrow to check for more loci
of bleeding with her established esopohageal varicies.
-For her AMS last night we did CT head which was negative, CT abd which just 
showed decreasing ascites.  coags were increased, cbc was above 8, 
-PT to mobilize the patient as this may be contributing factor and she is able 
to move, post paracentesis.  she feels better (slightly) today so if she is 
amenable to it we will try to get her to walk.  she is currently walking to the 
bathroom.
-Continue to give ensure, protein levels are not high enough to cause concern 
especially as she does not have hepatorenal syndrome (as per nutrition)
-Replete electrolytes as needed.
-The potassium has increased from 4.6-5.2, we will give Kayexalate for levels 
greater than 5.6.

- we assessed for nystagmus with plan that, if present, we would start high dose
thiamine for wenickes encephalopahty, no nystagmus noted.  
 
 
 
DVT prophylaxis ALPS
Clear liquid diet was 1000 cc fluid restriction
Full Code
Problem List:
 1. Alcoholic cirrhosis of liver with ascites
 
Pain Ratin
Pain Location:
abdomen
Pain Goal: Pain 4 or less
Pain Plan:
prn
Tomorrow's Labs & Rationales:
cbc
 
 
Rosario CORNEJO,Lindsey 18 1544:
Attending MD Review Statement
 
Attending Statement
Attending MD Statement: examined this patient, discuss w/resident/PA/NP, agreed 
w/resident/PA/NP, reviewed EMR data (avail), discussed with nursing, discussed 
with case mgmt, amended to note
Attending Assessment/Plan:
Patient seen and examined.  Lying in bed and not in acute distress.  She is 
alert but is confused.  She is oriented place and person.  She is confused about
the time and year.  She attempted answering questions will Been disoriented with
regards to time.  She had apparently vomited earlier on today.  When asked about
the time in one sentence she gave 3 different time periods.  She has no insight 
into her confusion.  
She denies any pain.  She is afebrile hemodynamically stable.  Large-volume 
paracentesis was done yesterday she is having bowel movements..  Fluid analysis 
is not suggestive of peritonitis.  
On examination she has a very large ecchymotic area on her leftUpper arm.  
Apparently does develop after blood transfusion she received over the weekend 
infiltrated.
 
Recommendations:
Continue lactulose.
Mobilize patient as tolerated.
She is scheduled to undergo EGD to evaluate for esophageal varices tomorrow.  
Discontinue all narcotics.
Follow recommendations of the gastroenterology service.

## 2018-05-30 VITALS — SYSTOLIC BLOOD PRESSURE: 110 MMHG | DIASTOLIC BLOOD PRESSURE: 62 MMHG

## 2018-05-30 VITALS — SYSTOLIC BLOOD PRESSURE: 104 MMHG | DIASTOLIC BLOOD PRESSURE: 66 MMHG

## 2018-05-30 VITALS — SYSTOLIC BLOOD PRESSURE: 108 MMHG | DIASTOLIC BLOOD PRESSURE: 50 MMHG

## 2018-05-30 LAB
ABSOLUTE GRANULOCYTE CT: 6.4 /CUMM (ref 1.4–6.5)
BASOPHILS # BLD: 0 /CUMM (ref 0–0.2)
BASOPHILS NFR BLD: 0.4 % (ref 0–2)
EOSINOPHIL # BLD: 0.2 /CUMM (ref 0–0.7)
EOSINOPHIL NFR BLD: 2.1 % (ref 0–5)
ERYTHROCYTE [DISTWIDTH] IN BLOOD BY AUTOMATED COUNT: 20.7 % (ref 11.5–14.5)
GRANULOCYTES NFR BLD: 62.7 % (ref 42.2–75.2)
HCT VFR BLD CALC: 23.7 % (ref 37–47)
LYMPHOCYTES # BLD: 1.8 /CUMM (ref 1.2–3.4)
MCH RBC QN AUTO: 34.4 PG (ref 27–31)
MCHC RBC AUTO-ENTMCNC: 34 G/DL (ref 33–37)
MCV RBC AUTO: 101.2 FL (ref 81–99)
MONOCYTES # BLD: 1.7 /CUMM (ref 0.1–0.6)
PLATELET # BLD: 128 /CUMM (ref 130–400)
PMV BLD AUTO: 8.4 FL (ref 7.4–10.4)
RED BLOOD CELL CT: 2.34 /CUMM (ref 4.2–5.4)
WBC # BLD AUTO: 10.2 /CUMM (ref 4.8–10.8)

## 2018-05-30 PROCEDURE — 0DJ08ZZ INSPECTION OF UPPER INTESTINAL TRACT, VIA NATURAL OR ARTIFICIAL OPENING ENDOSCOPIC: ICD-10-PCS

## 2018-05-30 NOTE — PN- HOUSESTAFF
Agnes CORNEJO,Sendy 18 0745:
Subjective
Follow-up For:
ascites
hepatic encephalopathy
Subjective:
Patient is in better spirits today and visiting with family.  she denies any 
abdominal pain currently.  She continues to have nausea and did vomit early this
morning.  She is set to go for EGD today.  She has had bouts of diarrhea on the 
lactulose.  Denies chest pain or shortness of breath.
 
Review of Systems
Constitutional:
Reports: no symptoms. 
EENTM:
Reports: no symptoms, icterus. 
Cardiovascular:
Reports: no symptoms. 
Respiratory:
Reports: no symptoms. 
Gastrointestinal:
Reports: distention, nausea, vomiting. 
Genitourinary:
Reports: no symptoms. 
Musculoskeletal:
Reports: see HPI. 
Skin:
Reports: no symptoms, jaundice. 
Neurological/Psychological:
Reports: no symptoms. 
 
Objective
Last 24 Hrs of Vital Signs/I&O
 Vital Signs
 
 
Date Time Temp Pulse Resp B/P B/P Pulse O2 O2 Flow FiO2
 
     Mean Ox Delivery Rate 
 
 1342 97.8 78 20 104/66  100 Room Air  
 
 0656 99.3 91 20 108/50  98 Room Air  
 
 2232 99.5 92 18 98/49  99 Room Air  
 
 
 Intake & Output
 
 
  1600  0800  0000
 
Intake Total 200  480
 
Output Total  300 
 
Balance 200 -300 480
 
    
 
Intake,   
 
Intake, Oral 0  480
 
Number 0  
 
Bowel   
 
Movements   
 
Output, Urine  300 
 
 
 
 
Physical Exam
General Appearance: Alert, Oriented X3, Cooperative, No Acute Distress
Skin: No Rashes, No Breakdown, No Significant Lesion
Skin Temp/Moisture Exam: Warm/Dry
Sepsis Skin Exam (color): Normal for Ethnicity
Cardiovascular: Regular Rate, Normal S1, Normal S2, No Murmurs
Lungs: Clear to Auscultation, Normal Air Movement
Abdomen: Normal Bowel Sounds, No Masses, some tenderness on palpation most in 
epigastric region. 
Neurological: Normal Speech
Extremities: No Clubbing, No Cyanosis, No Edema, Normal Pulses, No Tenderness/
Swelling
Vascular: Normal Pulses, Pulses Symmetrical
Current Medications:
 Current Medications
 
 
  Sig/Reagan Start time  Last
 
Medication Dose Route Stop Time Status Admin
 
Albumin Human 50 GM DAILY  0900 AC 
 
  IV  0901  0926
 
Ceftriaxone Sodium 1,000 MG .STK-MED ONE  1104 DC 
 
  IV  1105  
 
Chlorhexidine  1 GM .STK-MED ONE  1136 DC 
 
Gluconate  TOP  1137  
 
Ferrous Sulfate 325 MG BID  2100 AC 
 
  PO   0924
 
Folic Acid 1 MG DAILY  0900 AC 
 
  PO   0924
 
Lactulose 20 GM Q4  1800 AC 
 
  PO   0525
 
Midodrine 7.5 MG 0800,1200,1600  1200 AC 
 
  PO   1704
 
Nicotine 14 MG DAILY AS NEEDED PRN  0145 AC 
 
  TOP   0120
 
Octreotide Acetate 100 MCG TID  2100 AC 
 
  SC   1426
 
Omeprazole 40 MG DAILY AC  0700 AC 
 
  PO   0525
 
Patient Medication  1 ED ONE ONE  1830 DC 
 
Teaching  ED  1831  
 
Phosphate 250 MG PC AND AT BEDTIME  0945 AC 
 
  PO   1704
 
Rifaximin 550 MG BID  1402 AC 
 
  PO   0925
 
Thiamine HCl 100 MG DAILY  0900 AC 
 
  PO   0925
 
Trimethobenzamide HCl 200 MG TID PRN  1330 AC 
 
  IM   
 
 
 
 
Last 24 Hrs of Lab/Raghav Results
Last 24 Hrs of Labs/Mics:
 Laboratory Tests
 
18 0650:
CBC w Diff NO MAN DIFF REQ, RBC 2.34  L, .2  H, MCH 34.4  H, MCHC 34.0, 
RDW 20.7  H, MPV 8.4, Gran % 62.7, Lymphocytes % 18.1  L, Monocytes % 16.7  H, 
Eosinophils % 2.1, Basophils % 0.4, Absolute Granulocytes 6.4, Absolute 
Lymphocytes 1.8, Absolute Monocytes 1.7  H, Absolute Eosinophils 0.2, Absolute 
Basophils 0
 
18:
Urine Pregnancy Test NEGATIVE
 
 
Assessment/Plan
Assessment:
Ms. Sanchez is a 31yo F w/ PMH of alcohol dependence, alcohol liver cirrhosis/
failure with hepatorenal syndrome, from home presented to ER with numerous 
complaints including medication r refill of oxycodone, abdominal/back pain. 
Patient was recent discharge from [facility] on 2018, for treatment of
hyponatremia with fluid restriction, hepatorenal syndrome with IV albumin 
infusion/midodrine/octreotide/pentoxifylline 400 mg p.o. 3 times daily 4 weeks 
starting on 2018.  Patient underwent EGD 2018, and was diagnosed with 
stage I esophageal varices, however not a candidate of beta-blocker due to 
hepatorenal syndrome.  Patient was also treated with acute blood loss was 2 
units of PRBC on 2018.
 
On admission,
Vitals: Afebrile, tachycardia 123, RR 18, /79, 98% on room air
 
-CBC: Mild leukocytosis 12.7, microcytic anemia 7.1/20.4 (baseline 10.1 on ).
-BMP: Hyponatremia 126, K4.2, creatinine 1.1 (baseline 0.1 in 2018).  
Hypomagnesemia 1.5, elevated alk phos 159, albumin 3.2, lipase 1151
-UA/Microbiology:
-Misc: Paracentesis cytology showed no identification of malignancy.
-Ab CT: ascites, however no pancreatitis
 
 
Problem list & Assessment: 
#Decompensated alcoholic liver cirrhosis
#Question of hepatorenal syndrome
#Hyponatremia
#Hypomagnesemia
#Borderline macrocytic anemia with unknown etiology, possibly secondary to 
esophageal varices/duodenitis, hemoglobin below 7
 
 
Hospital Course:
 
-Diagnostic para last week showed SAAG over 1.1 indicative of portal 
hypertension and no SBP.  Patient had paracentesis two days back, therapeutic 
and diagnostic, gram stain, cell count and differential, culture and 
sensitivities, cytology, ascites albumin, LDH and total protein done. Again SAAG
greated than 1.1, no evidence of SBP.  Follow body fluid culture, so far 
negative.
-Ammonia level lower at 32.  If high we were going to increase lactulose.
-GI doubts hepatorenal syndrome as creatinine has remained stable.  With the 
large volume paracentesis we gave 8 g of albumin for every liter withdrawn.  We 
held all fluids as we believe this is contributing to her ascites.  We also held
fluids as patient was hyponatremic which has resolved. We will minimize IV 
medications but will not restrict PO fluids.  Patient is no longer hyponatremic.
 
-Labs done last night showed normal calcium, magnesium, B12, folate, thyroid 
function tests.  We will follow up BEP.
-Patient had diarrhea yesterday.  Holding parameter for lactulose is diarrhea, 
current dose is 20 q4h.
-As per GI yesterday we will restart octreotide 100mg tid SC, continue albumin 
at 50 grams for next 2 days with plan to decrease to 25 afterwards, continue 
midodrine, continue rifaxamin 550mg bid.  We stopped narcotics as this could be 
causing confusion as well. 
-Patient had EGD for potential banding, no varicies found but severe 
hypertensive gastropathy was noted.
-PT to mobilize the patient as this may be contributing factor and she is able 
to move, post paracentesis.  she feels better (slightly) today so if she is 
amenable to it we will try to get her to walk.  she is currently walking to the 
bathroom.
-Continue to give ensure, protein levels in the ensure are not high enough to 
cause concern especially as she does not have hepatorenal syndrome (as per 
nutrition).  patient does qualify for mild protein calorie malnutrition.
-Replete electrolytes as needed.
-Give Kayexalate for levels greater than 5.6.

- we assessed for nystagmus with plan that, if present, we would start high dose
thiamine for wenickes encephalopahty, no nystagmus noted.  
-Plan is for potential paracentesis therapeutic tomorrow with DC later in the 
day.
 
 
 
DVT prophylaxis ALPS
Clear liquid diet was 1000 cc fluid restriction
Full Code
Problem List:
 1. Alcoholic cirrhosis of liver with ascites
 
 2. Symptomatic anemia
 
Pain Ratin
Pain Location:
abdomen
Pain Goal: Pain 4 or less
Pain Plan:
na
Tomorrow's Labs & Rationales:
sommer Ponce [facility] 18 1531:
Attending MD Review Statement
 
Attending Statement
Attending MD Statement: examined this patient, discuss w/resident/PA/NP, agreed 
w/resident/PA/NP, reviewed EMR data (avail), discussed with nursing, discussed 
with case mgmt, amended to note
Attending Assessment/Plan:
Patient seen and examined.  She is better oriented today.  She is alert.  She is
oriented 3.  She is conversant appropriately.  She answers questions 
appropriately.  She remains afebrile and hemodynamically stable.  EGD was done 
today shows severe portal hypertensive gastropathy.  It also reveals grade 1 
esophageal varices.  No stigmata of bleeding noted.
On examination abdomen is distended soft and nontender with normal bowel sounds.
 Ecchymotic area in the right upper extremity is persistent.
 
 
Recommendations:
-Continue octreotide and midodrine as recommended by the gastroenterology 
service.
-Reevaluate for need for paracentesis tomorrow.
-If she remains clinically stable she may be discharged home tomorrow.

## 2018-05-31 VITALS — DIASTOLIC BLOOD PRESSURE: 47 MMHG | SYSTOLIC BLOOD PRESSURE: 85 MMHG

## 2018-05-31 VITALS — SYSTOLIC BLOOD PRESSURE: 109 MMHG | DIASTOLIC BLOOD PRESSURE: 65 MMHG

## 2018-05-31 VITALS — DIASTOLIC BLOOD PRESSURE: 60 MMHG | SYSTOLIC BLOOD PRESSURE: 110 MMHG

## 2018-05-31 LAB
APTT BLD: 44 SEC (ref 25–37)
PROTHROMBIN TIME: 21.1 SEC (ref 9.4–12.5)

## 2018-05-31 PROCEDURE — 0W9G3ZZ DRAINAGE OF PERITONEAL CAVITY, PERCUTANEOUS APPROACH: ICD-10-PCS

## 2018-05-31 NOTE — PN- HOUSESTAFF
Agnes CORNEJO,Sendy 18 0749:
Subjective
Follow-up For:
liver failure
Subjective:
Patient is in better spirits today.  She has some pain in the right abdomen.  
Denies any nausea or vomting.
 
Review of Systems
Constitutional:
Reports: no symptoms. 
Cardiovascular:
Reports: no symptoms. 
Respiratory:
Reports: no symptoms. 
Gastrointestinal:
Reports: abdominal pain. 
Neurological/Psychological:
Reports: no symptoms. 
 
Objective
Last 24 Hrs of Vital Signs/I&O
 Vital Signs
 
 
Date Time Temp Pulse Resp B/P B/P Pulse O2 O2 Flow FiO2
 
     Mean Ox Delivery Rate 
 
 1402 98.7 80 20 109/65  100 Room Air  
 
 0730 98.8 94 18 85/47  100 Room Air  
 
 2040 99.3 81 18 110/62  100   
 
 
 Intake & Output
 
 
  1600  0800  0000
 
Intake Total 1790 250 
 
Output Total   400
 
Balance 1790 250 -400
 
    
 
Intake, IV 50 10 
 
Intake, Oral 1740 240 
 
Number 1 4 1
 
Bowel   
 
Movements   
 
Output, Urine   400
 
Patient  110 lb 
 
Weight   
 
 
 
 
Physical Exam
General Appearance: Alert, Oriented X3, Cooperative, No Acute Distress
HEENT: Atraumatic, PERRLA, EOMI, Mucous Membr. moist/pink
Cardiovascular: Regular Rate, Normal S1, Normal S2
Lungs: Clear to Auscultation, Normal Air Movement
Abdomen: Normal Bowel Sounds, No Masses, distended, painful to palpation on the 
right side
Neurological: Normal Gait, Normal Speech
Current Medications:
 Current Medications
 
 
  Sig/Reagan Start time  Last
 
Medication Dose Route Stop Time Status Admin
 
Albumin Human 50 GM DAILY  0900 DC 
 
  IV  0901  0830
 
Ferrous Sulfate 325 MG BID  2100 AC 
 
  PO   0828
 
Folic Acid 1 MG DAILY  0900 AC 
 
  PO   0828
 
Lactulose 20 GM Q4  1800 AC 
 
  PO   0543
 
Lidocaine 1 ML .STK-MED ONE  1353 DC 
 
  ID  [id number]  
 
Midodrine 7.5 MG 0800,1200,1600  1200 AC 
 
  PO   1331
 
Nicotine 14 MG DAILY AS NEEDED PRN  0145  
 
  TOP   1331
 
Octreotide Acetate 100 MCG TID  2100 AC 
 
  SC   0828
 
Omeprazole 40 MG DAILY AC  0700 AC 
 
  PO   0541
 
Oxycodone HCl 5 MG ONCE ONE  2315 DC 
 
  PO  2316  2310
 
Phosphate 250 MG PC AND AT BEDTIME  0945 AC 
 
  PO   1331
 
Phytonadione 5 MG DAILY  1617 AC 
 
  PO   
 
Phytonadione 5 MG DAILY  1545 CAN 
 
  PO   
 
Rifaximin 550 MG BID  1402 AC 
 
  PO   0828
 
Thiamine HCl 100 MG DAILY  0900 AC 
 
  PO   0829
 
Trimethobenzamide HCl 200 MG TID PRN  1330 AC 
 
  IM   
 
 
 
 
Last 24 Hrs of Lab/Raghav Results
Last 24 Hrs of Labs/Mics:
 Laboratory Tests
 
18 1110:
Anion Gap 15, Estimated GFR > 60, BUN/Creatinine Ratio 30.0  H, Troponin I < 
0.01, PT 21.1  H, INR 1.92  H, APTT 44  H
 Microbiology
 0854  STOOL: Clostridium difficile Toxin A & B - COLB
 
 
 
Assessment/Plan
Assessment:
Ms. Sanchez is a 33yo F w/ PMH of alcohol dependence, alcohol liver cirrhosis/
failure with hepatorenal syndrome, from home presented to ER with numerous 
complaints including medication r refill of oxycodone, abdominal/back pain. 
Patient was recent discharge from [facility] on 2018, for treatment of
hyponatremia with fluid restriction, hepatorenal syndrome with IV albumin 
infusion/midodrine/octreotide/pentoxifylline 400 mg p.o. 3 times daily 4 weeks 
starting on 2018.  Patient underwent EGD 2018, and was diagnosed with 
stage I esophageal varices, however not a candidate of beta-blocker due to 
hepatorenal syndrome.  Patient was also treated with acute blood loss was 2 
units of PRBC on 2018.
 
On admission,
Vitals: Afebrile, tachycardia 123, RR 18, /79, 98% on room air
 
-CBC: Mild leukocytosis 12.7, microcytic anemia 7.1/20.4 (baseline 10.1 on ).
-BMP: Hyponatremia 126, K4.2, creatinine 1.1 (baseline 0.1 in 2018).  
Hypomagnesemia 1.5, elevated alk phos 159, albumin 3.2, lipase 1151
-UA/Microbiology:
-Misc: Paracentesis cytology showed no identification of malignancy.
-Ab CT: ascites, however no pancreatitis
 
 
Problem list & Assessment: 
#Decompensated alcoholic liver cirrhosis
#Question of hepatorenal syndrome
#Hyponatremia
#Hypomagnesemia
#Borderline macrocytic anemia with unknown etiology, possibly secondary to 
esophageal varices/duodenitis, hemoglobin below 7
 
 
Hospital Course:
 
-Had a final round of paracentesis today, successful drainage of 4L of fluid.
-Diagnostic para last week showed SAAG over 1.1 indicative of portal 
hypertension and no SBP.  Patient had paracentesis two days back, therapeutic 
and diagnostic, gram stain, cell count and differential, culture and 
sensitivities, cytology, ascites albumin, LDH and total protein done. Again SAAG
greated than 1.1, no evidence of SBP.  Follow body fluid culture, so far 
negative.
-Ammonia level lower at 32.  If high we were going to increase lactulose.
-GI doubts hepatorenal syndrome as creatinine has remained stable.  With the 
large volume paracentesis we gave 8 g of albumin for every liter withdrawn.  We 
held all fluids as we believe this is contributing to her ascites.  We also held
fluids as patient was hyponatremic which has resolved. We will minimize IV 
medications but will not restrict PO fluids.  Patient is no longer hyponatremic.
 
-Labs done last night showed normal calcium, magnesium, B12, folate, thyroid 
function tests.  We will follow up BEP.
-Patient had diarrhea yesterday.  Holding parameter for lactulose is diarrhea, 
current dose is 20 q4h.  She will go home on lactulose
-As per GI yesterday we will restart octreotide 100mg tid SC, continue albumin 
at 50 grams for next 2 days with plan to decrease to 25 afterwards, continue 
midodrine, continue rifaxamin 550mg bid.  We stopped narcotics as this could be 
causing confusion as well. 
-Patient had EGD for potential banding, no varicies found but severe 
hypertensive gastropathy was noted.
-PT to mobilize the patient as this may be contributing factor and she is able 
to move, post paracentesis.  she feels better (slightly) today so if she is 
amenable to it we will try to get her to walk.  she is currently walking to the 
bathroom.
-Continue to give ensure, protein levels in the ensure are not high enough to 
cause concern especially as she does not have hepatorenal syndrome (as per 
nutrition).  patient does qualify for mild protein calorie malnutrition.
-Replete electrolytes as needed.
-Give Kayexalate for levels greater than 5.6.

- we assessed for nystagmus with plan that, if present, we would start high dose
thiamine for wenickes encephalopahty, no nystagmus noted.  
-Patient was set to leave today but INR was found to be trending upwards which 
showed that patient may be leading towards fulminant liver failure so we will 
keep the patient today and retest the INR and LFTs tomorrow.
 
 
 
DVT prophylaxis ALPS
Clear liquid diet was 1000 cc fluid restriction
Full Code
Problem List:
 1. Alcoholic cirrhosis of liver with ascites
 
Pain Ratin
Pain Location:
right abdomen
Pain Goal: Pain 4 or less
Pain Plan:
prn
Tomorrow's Labs & Rationales:
inr lft
 
 
Lindsey Ponce MD 18 1449:
Attending MD Review Statement
 
Attending Statement
Attending MD Statement: examined this patient, discuss w/resident/PA/NP, agreed 
w/resident/PA/NP, reviewed EMR data (avail), discussed with nursing, discussed 
with case mgmt, amended to note
Attending Assessment/Plan:
Patient seen and examined. No issues overnight reported by nursing staff.  
Remains afebrile and hemodynamically stable. Resting comfortably and not in any 
acute distress.  She is alert and oriented 3.  She is conversing appropriately.
 Denies nausea vomiting.  Denies abdominal pain.  She is having bowel movements.
 On examination today abdomen is more distended, mildly tense, nontender.  
Normal bowel sounds.  Therapeutic paracentesis was done today.
 
Laboratory studies were repeated today as recommended by the gastroenterology 
service.  Renal function is stable.  She is mildly hyponatremic likely related 
to third spacing due to her liver disease.  Coagulation profile was also 
elevated again due to her liver disease.  Hemoglobin level is stable.  She has 
no evidence of active bleeding.
 
Plan:
-Administer vitamin K 10 mg orally today.
-Continue rifaximin and lactulose to prevent hepatic encephalopathy.
-Blood pressure was reported to be in the 80 systolic this morning however 
repeat blood pressure without intervention was in the 120s.  Continue midodrine.
-The gastroenterology service is recommending continued inpatient 
hospitalization as her elevated INR is a sign of ongoing hepatic decompensation.
 Please follow-up with Dr. Telma Walters for further recommendations and 
management, un fortunately her prognosis is gaurded.  Patient is currently 
clinically stable with no evidence of active bleeding.  Recommend referral to 
the [facility] hepatology clinic for consideration of liver 
transplant if she continues to abstain from alcohol use.

## 2018-05-31 NOTE — PN- GASTROENTEROLOGY
**See Addendum**
Assessment/Plan GI
Assessment/Recommendations:
ASSESSMENT:
1.  Cirrhosis
2.  Ascites
3.  Question hepatorenal syndrome, type II
4.  Known esophageal varices 
5.  ? GOV1
6.  Severe Portal Hypertensive Gastropathy
7.  Hepatic encephalopathy
 
RECOMMENDATIONS:
1.  Repeat coags, and BMP today.  If possible urine Na.
2.  Okay to repeat LVP.  Make sure that patient receives 8 grams albumin for 
every one liter of ascitic fluid that is removed.
3.  Consider d/c/ to home on midrodrine, lactulose and xifaxan
4.  Patient to follow up with Dr. Wolfgang Caceres.  She has an appointment to see him 
tomorrow afternoon
 
 
Subjective
Subjective:
Patient is doing well.  Ambulating without difficulty.  No nausea, vomiting or 
abdominal pain.
 
Objective
Vital Signs and I&Os
Vital Signs
 
 
Date Time Temp Pulse Resp B/P B/P Pulse O2 O2 Flow FiO2
 
     Mean Ox Delivery Rate 
 
05/31 0730 98.8 94 18 85/47  100 Room Air  
 
05/30 2040 99.3 81 18 110/62  100   
 
05/30 1342 97.8 78 20 104/66  100 Room Air  
 
 
 Intake & Output
 
 
 05/31 1600 05/31 0400 05/30 1600 05/30 0400 05/29 1600 05/29 0400
 
Intake Total 250   200
 
Output Total  400 300  825 0
 
Balance 250 -400 -100 480 195 200
 
       
 
Intake, IV 10  200   
 
Intake, Oral 240  0 480 1020 200
 
Number 4 1 0  4 
 
Bowel      
 
Movements      
 
Output, Urine  400 300  825 0
 
Patient 110 lb     
 
Weight      
 
 
 
 
Physical Exam
General Appearance: comfortable, cachetic
Respiratory: lungs clear
Cardiovascular: regular rate/rhythm, Normal S1 and S2 without rub, murmur or 
gallop
Abdomen: normal bowel sounds, soft, non-tender, no organomegaly
Neurologic/Psychiatric: alert, oriented x 3
Skin: jaundice
Current Medications:
 Current Medications
 
 
  Sig/Reagan Start time  Last
 
Medication Dose Route Stop Time Status Admin
 
Albumin Human 50 GM DAILY 05/30 0900 DC 05/31
 
  IV 05/31 0901  0830
 
Ceftriaxone Sodium 1,000 MG .STK-MED ONE 05/30 1104 DC 
 
  IV 05/30 1105  
 
Chlorhexidine  1 GM .STK-MED ONE 05/30 1136 DC 
 
Gluconate  TOP 05/30 1137  
 
Ferrous Sulfate 325 MG BID 05/24 2100 AC 05/31
 
  PO   0828
 
Folic Acid 1 MG DAILY 05/24 0900 AC 05/31
 
  PO   0828
 
Lactulose 20 GM Q4 05/28 1800 AC 05/31
 
  PO   0543
 
Midodrine 7.5 MG 0800,1200,1600 05/27 1200 AC 05/31
 
  PO   0828
 
Nicotine 14 MG DAILY AS NEEDED PRN 05/24 0145 AC 05/30
 
  TOP   0120
 
Octreotide Acetate 100 MCG TID 05/29 2100 AC 05/31
 
  SC   0828
 
Omeprazole 40 MG DAILY AC 05/28 0700 AC 05/31
 
  PO   0541
 
Oxycodone HCl 5 MG ONCE ONE 05/30 2315 DC 05/30
 
  PO 05/30 2316  2310
 
Phosphate 250 MG PC AND AT BEDTIME 05/28 0945 AC 05/31
 
  PO   0831
 
Rifaximin 550 MG BID 05/29 1402 AC 05/31
 
  PO   0828
 
Thiamine HCl 100 MG DAILY 05/24 0900 AC 05/31
 
  PO   0829
 
Trimethobenzamide HCl 200 MG TID PRN 05/29 1330 AC 
 
  IM   
 
 
 
 
Results
Pertinent Lab Results:
 Laboratory Tests
 
 
 05/30 05/29
 
 0650 2000
 
Hematology  
 
  CBC w Diff NO MAN DIFF REQ 
 
  WBC (4.8 - 10.8 /CUMM) 10.2 
 
  RBC (4.20 - 5.40 /CUMM) 2.34  L 
 
  Hgb (12.0 - 16.0 G/DL) 8.1  L 
 
  Hct (37 - 47 %) 23.7  L 
 
  MCV (81.0 - 99.0 FL) 101.2  H 
 
  MCH (27.0 - 31.0 PG) 34.4  H 
 
  MCHC (33.0 - 37.0 G/DL) 34.0 
 
  RDW (11.5 - 14.5 %) 20.7  H 
 
  Plt Count (130 - 400 /CUMM) 128  L 
 
  MPV (7.4 - 10.4 FL) 8.4 
 
  Gran % (42.2 - 75.2 %) 62.7 
 
  Lymphocytes % (20.5 - 51.1 %) 18.1  L 
 
  Monocytes % (1.7 - 9.3 %) 16.7  H 
 
  Eosinophils % (0 - 5 %) 2.1 
 
  Basophils % (0.0 - 2.0 %) 0.4 
 
  Absolute Granulocytes (1.4 - 6.5 /CUMM) 6.4 
 
  Absolute Lymphocytes (1.2 - 3.4 /CUMM) 1.8 
 
  Absolute Monocytes (0.10 - 0.60 /CUMM) 1.7  H 
 
  Absolute Eosinophils (0.0 - 0.7 /CUMM) 0.2 
 
  Absolute Basophils (0.0 - 0.2 /CUMM) 0 
 
Urines  
 
  Urine Pregnancy Test  NEGATIVE
 
 
 
 
 05/29 05/29
 
 0700 0600
 
Chemistry  
 
  Sodium (137 - 145 mmol/L)  137
 
  Potassium (3.5 - 5.1 mmol/L)  4.1
 
  Chloride (98 - 107 mmol/L)  100
 
  Carbon Dioxide (22 - 30 mmol/L)  25
 
  Anion Gap (5 - 16)  12
 
  BUN (7 - 17 mg/dL)  32  H
 
  Creatinine (0.5 - 1.0 mg/dL)  0.9
 
  Estimated GFR (>60 ml/min)  > 60
 
  BUN/Creatinine Ratio (7 - 25 %)  35.6  H
 
Hematology  
 
  CBC w Diff NO MAN DIFF REQ 
 
  WBC (4.8 - 10.8 /CUMM) 10.5 
 
  RBC (4.20 - 5.40 /CUMM) 2.39  L 
 
  Hgb (12.0 - 16.0 G/DL) 8.3  L 
 
  Hct (37 - 47 %) 24.3  L 
 
  MCV (81.0 - 99.0 FL) 101.7  H 
 
  MCH (27.0 - 31.0 PG) 34.7  H 
 
  MCHC (33.0 - 37.0 G/DL) 34.2 
 
  RDW (11.5 - 14.5 %) 20.7  H 
 
  Plt Count (130 - 400 /CUMM) 145 
 
  MPV (7.4 - 10.4 FL) 8.3 
 
  Gran % (42.2 - 75.2 %) 69.8 
 
  Lymphocytes % (20.5 - 51.1 %) 15.6  L 
 
  Monocytes % (1.7 - 9.3 %) 14.0  H 
 
  Eosinophils % (0 - 5 %) 0.4 
 
  Basophils % (0.0 - 2.0 %) 0.2 
 
  Absolute Granulocytes (1.4 - 6.5 /CUMM) 7.3  H 
 
  Absolute Lymphocytes (1.2 - 3.4 /CUMM) 1.6 
 
  Absolute Monocytes (0.10 - 0.60 /CUMM) 1.5  H 
 
  Absolute Eosinophils (0.0 - 0.7 /CUMM) 0 
 
  Absolute Basophils (0.0 - 0.2 /CUMM) 0 
 
 
 
 
 05/28 05/28 05/28 05/28
 
 1715 1410 1410 1120
 
Chemistry    
 
  Total Bilirubin (0.2 - 1.3 mg/dL)   5.8  H 
 
  Direct Bilirubin (< 0.4 mg/dL)   3.0  H 
 
  AST (14 - 36 U/L)   50  H 
 
  ALT (9 - 52 U/L)   32 
 
  Alkaline Phosphatase (<127 U/L)   86 
 
  Ammonia (9 - 30 umol/L)  32  H  
 
  Total Protein (6.3 - 8.2 g/dL)   6.2  L 
 
  Albumin (3.5 - 5.0 g/dL)   3.5 
 
Coagulation    
 
  PT (9.4 - 12.5 SEC) 19.9  H   
 
  INR (0.90 - 1.19) 1.81  H   
 
  APTT (25 - 37 SEC) 44  H   
 
Hematology    
 
  CBC w Diff NO MAN DIFF REQ   
 
  WBC (4.8 - 10.8 /CUMM) 11.4  H   
 
  RBC (4.20 - 5.40 /CUMM) 2.31  L   
 
  Hgb (12.0 - 16.0 G/DL) 8.1  L   
 
  Hct (37 - 47 %) 23.1  L   
 
  MCV (81.0 - 99.0 FL) 100.3  H   
 
  MCH (27.0 - 31.0 PG) 34.9  H   
 
  MCHC (33.0 - 37.0 G/DL) 34.8   
 
  RDW (11.5 - 14.5 %) 20.1  H   
 
  Plt Count (130 - 400 /CUMM) 134   
 
  MPV (7.4 - 10.4 FL) 8.5   
 
  Gran % (42.2 - 75.2 %) 73.7   
 
  Lymphocytes % (20.5 - 51.1 %) 12.0  L   
 
  Monocytes % (1.7 - 9.3 %) 13.4  H   
 
  Eosinophils % (0 - 5 %) 0.7   
 
  Basophils % (0.0 - 2.0 %) 0.2   
 
  Absolute Granulocytes (1.4 - 6.5 /CUMM) 8.4  H   
 
  Absolute Lymphocytes (1.2 - 3.4 /CUMM) 1.4   
 
  Absolute Monocytes (0.10 - 0.60 /CUMM) 1.5  H   
 
  Absolute Eosinophils (0.0 - 0.7 /CUMM) 0.1   
 
  Absolute Basophils (0.0 - 0.2 /CUMM) 0   
 
Other Body Source    
 
  Fluid WBC (0 - 5 /CUMM)    238  H
 
  Fld Mesothelial Cells (%)      
 
  Fld Total RBCs Counted (0 /CUMM)    10  H
 
 
 
 
 05/28
 
 1120
 
Hematology 
 
  Lymphocytes (%) 28
 
  % Normal PMNs (%) 2
 
Other Body Source 
 
  Fluid Glucose (mg/dL) 94
 
  Fluid Total Protein (g/dL) 2.6
 
  Fluid Albumin (g/dL) 1.3
 
  Fluid LDH (U/L) 231
 
  Fluid Amylase (U/L) 108

## 2018-05-31 NOTE — PATIENT DISCHARGE INSTRUCTIONS
Discharge Instructions
 
General Discharge Information
You were seen/treated for:
Liver failure
Special Instructions:
1.  Please follow up with PCP in one week for reevaluation of ascites and 
potential repeat therapeutic paracentesis
2.  Please follow up with gastroenterology in one week, Dr. Caceres.
3.  Please follow up with [facility] Hamida Cheek, call to 
make appointment.
4.  Follow up with [facility] or [facility] ([facility] for 
help with abstaining from alcohol.
 
Diet
Continue normal diet: Yes
 
Activity
Full Activity/No Limits: Yes
 
Acute Coronary Syndrome
 
Inclusion Criteria
At DC or during hospital stay patient has or had the following:
ACS DIAGNOSIS No
 
Discharge Core Measures
Meds if any: Prescribed or Continued at Discharge
Meds if any: NOT Prescribed or Continued at Discharge
 
Congestive Heart Failure
 
Inclusion Criteria
At DC or during hospital stay patient has or had the following:
CHF DIAGNOSIS No
 
Discharge Core Measures
Meds if any: Prescribed or Continued at Discharge
Meds if any: NOT Prescribed or Continued at Discharge
 
Cerebrovascular accident
 
Inclusion Criteria
At DC or during hospital stay patient has or had the following:
CVA/TIA Diagnosis No
 
Discharge Core Measures
Meds if any: Prescribed or Continued at Discharge
Meds if any: NOT Prescribed or Continued at Discharge
 
Venous thromboembolism
 
Inclusion Criteria
VTE Diagnosis No
VTE Type NONE
VTE Confirmed by (Test) NONE
 
Discharge Core Measures
- Per Current guidelines, there needs to be overlap
- treatment for the first 5 days of Warfarin therapy.
- If discharged on Warfarin prior to 5 days of
- overlap therapy, the patient will need to be
- assessed for post discharge needs including
- *Post discharge parental anticoagulation
- *Warfarin and/or parental anticoagulation education
- *Follow up date to check INR post discharge
At least 5 days overlap therapy as Inpatient No
Meds if any: Prescribed or Continued at Discharge
Note: Overlap Therapy is Warfarin and Anticoagulant
Meds if any: NOT Prescribed or Continued at Discharge

## 2018-05-31 NOTE — ULTRASOUND REPORT
EXAMINATION:
PARACENTESIS
 
CLINICAL INFORMATION:
Ascites
 
COMPARISON:
Paracentesis 05/28/2018
 
TECHNIQUE:
Indirect ultrasound guidance using a 6 [employer] Safe-T-Centesis closed
needle/catheter system
 
FINDINGS:
Informed consent was obtained from the patient prior to the procedure. During
this process, the procedure alternatives were explained, along with the
intended outcome and benefits. The risks of the procedure, as well as the
risk of not doing the procedure, was discussed. The patient was given the
opportunity to ask questions regarding the procedure and appeared competent
to make medical decisions. A signed consent form which documents this
discussion was placed in the medical record.
 
Ultrasound evaluation of the abdomen for ascites was performed. Moderate
amount of ascites is noted in the right  lower quadrant. The site was marked.
A timeout procedure was performed. The area was prepped and draped in usual
sterile fashion.
 
Using standard interventional and sterile techniques, lidocaine was used to
anesthetize the region.  A 6 [place] Safe-T-Centesis closed needle/catheter
system was introduced into the right  lower quadrant using standard safety
needle technique. Approximately 4 L of bright yellow fluid was removed into
the Vacutainer bottles. The catheter was then removed. Good hemostasis was
achieved. Dermabond was placed.
 
The patient demonstrated immediate symptomatic relief. The patient tolerated
the procedure well. The patient was discharged from the department in stable
condition.
 
COMPLICATIONS:
None.
 
IMPRESSION:
Successful ultrasound-guided paracentesis yielding 4 L of fluid.

## 2018-06-01 VITALS — SYSTOLIC BLOOD PRESSURE: 110 MMHG | DIASTOLIC BLOOD PRESSURE: 60 MMHG

## 2018-06-01 VITALS — DIASTOLIC BLOOD PRESSURE: 62 MMHG | SYSTOLIC BLOOD PRESSURE: 112 MMHG

## 2018-06-01 VITALS — DIASTOLIC BLOOD PRESSURE: 60 MMHG | SYSTOLIC BLOOD PRESSURE: 110 MMHG

## 2018-06-01 VITALS — SYSTOLIC BLOOD PRESSURE: 108 MMHG | DIASTOLIC BLOOD PRESSURE: 58 MMHG

## 2018-06-01 LAB — PROTHROMBIN TIME: 20.9 SEC (ref 9.4–12.5)

## 2018-06-01 NOTE — PN- HOUSESTAFF
Agnes CORNEJO,Sendy 06/01/18 0744:
Subjective
Follow-up For:
Hepatic encephalopathy
Liver failure
Subjective:
Patient was in much better spirits today on interview.  She states that she had 
more pain last evening and still has some pain in her abdomen on the right side 
but that it is much better.  She denies any nausea or vomiting.  She states that
she has been having a lot of watery diarrhea on the lactulose with it being held
numerous times.
 
Review of Systems
Constitutional:
Reports: no symptoms. 
Cardiovascular:
Reports: no symptoms. 
Respiratory:
Reports: no symptoms. 
Gastrointestinal:
Reports: diarrhea, distention. 
Genitourinary:
Reports: no symptoms. 
Neurological/Psychological:
Reports: no symptoms. 
 
Objective
Last 24 Hrs of Vital Signs/I&O
 Vital Signs
 
 
Date Time Temp Pulse Resp B/P B/P Pulse O2 O2 Flow FiO2
 
     Mean Ox Delivery Rate 
 
06/01 1449 98.5 86 18 110/60  100 Room Air  
 
06/01 1200 98.4 74 18 112/62     
 
06/01 1000 98.4 74 18 112/62     
 
06/01 0800 98.8 78 18 108/58     
 
05/31 2225 99.4 78 17 110/60  100 Room Air  
 
 
 Intake & Output
 
 
 06/01 1600 06/01 0800 06/01 0000
 
Intake Total 360 120 
 
Output Total   
 
Balance 360 120 
 
    
 
Intake, Oral 360 120 
 
Number 1  
 
Bowel   
 
Movements   
 
Patient 110 lb  
 
Weight   
 
 
 
 
Physical Exam
General Appearance: Alert, Oriented X3, Cooperative, No Acute Distress
Skin: No Rashes
Skin Temp/Moisture Exam: Warm/Dry
Sepsis Skin Exam (color): Normal for Ethnicity
Cardiovascular: Regular Rate, Normal S1, Normal S2
Lungs: Clear to Auscultation, Normal Air Movement
Abdomen: No Masses
Neurological: Normal Speech
Extremities: No Clubbing, No Cyanosis
Current Medications:
 Current Medications
 
 
  Sig/Reagan Start time  Last
 
Medication Dose Route Stop Time Status Admin
 
Ferrous Sulfate 325 MG BID 05/24 2100 AC 06/01
 
  PO   0902
 
Folic Acid 1 MG DAILY 05/24 0900 AC 06/01
 
  PO   0901
 
Lactulose 20 GM Q4 05/28 1800 AC 06/01
 
  PO   1400
 
Midodrine 7.5 MG 0800,1200,1600 05/27 1200 AC 06/01
 
  PO   1559
 
Nicotine 14 MG DAILY AS NEEDED PRN 05/24 0145 AC 05/31
 
  TOP   1331
 
Octreotide Acetate 100 MCG TID 05/29 2100 AC 06/01
 
  SC   1409
 
Omeprazole 40 MG DAILY AC 05/28 0700 AC 06/01
 
  PO   0515
 
Oxycodone HCl 5 MG .STK-MED ONE 06/01 0004 DC 
 
  PO 06/01 0005  
 
Oxycodone HCl 5 MG ONCE ONE 05/31 2345 DC 06/01
 
  PO 05/31 2346  0006
 
Phosphate 250 MG PC AND AT BEDTIME 05/28 0945 AC 05/31
 
  PO   1827
 
Phytonadione 5 MG DAILY 05/31 1617 AC 06/01
 
  PO   0902
 
Phytonadione 5 MG DAILY 05/31 1545 CAN 
 
  PO   
 
Rifaximin 550 MG BID 05/29 1402 AC 06/01
 
  PO   0859
 
Thiamine HCl 100 MG DAILY 05/24 0900 AC 06/01
 
  PO   0859
 
Trimethobenzamide HCl 200 MG TID PRN 05/29 1330 AC 
 
  IM   
 
 
 
 
Last 24 Hrs of Lab/Raghav Results
Last 24 Hrs of Labs/Mics:
 Laboratory Tests
 
06/01/18 0830:
Anion Gap 11, Estimated GFR > 60, BUN/Creatinine Ratio 28.0  H, Total Bilirubin 
6.4  H, Direct Bilirubin 3.1  H, AST 28, ALT 27, Alkaline Phosphatase 87, Total 
Protein 5.8  L, Albumin 2.9  L, PT 20.9  H, INR 1.90  H
 
 
Assessment/Plan
Assessment:
Ms. Sanchez is a 31yo F w/ PMH of alcohol dependence, alcohol liver cirrhosis/
failure with hepatorenal syndrome, from home presented to ER with numerous 
complaints including medication r refill of oxycodone, abdominal/back pain. 
Patient was recent discharge from [facility] on 5/9/2018, for treatment of
hyponatremia with fluid restriction, hepatorenal syndrome with IV albumin 
infusion/midodrine/octreotide/pentoxifylline 400 mg p.o. 3 times daily 4 weeks 
starting on 5/5/2018.  Patient underwent EGD 5/2/2018, and was diagnosed with 
stage I esophageal varices, however not a candidate of beta-blocker due to 
hepatorenal syndrome.  Patient was also treated with acute blood loss was 2 
units of PRBC on 4/28/2018.
 
On admission,
Vitals: Afebrile, tachycardia 123, RR 18, /79, 98% on room air
 
-CBC: Mild leukocytosis 12.7, microcytic anemia 7.1/20.4 (baseline 10.1 on 5/17/
18).
-BMP: Hyponatremia 126, K4.2, creatinine 1.1 (baseline 0.1 in 5/2018).  
Hypomagnesemia 1.5, elevated alk phos 159, albumin 3.2, lipase 1151
-UA/Microbiology:
-Misc: Paracentesis cytology showed no identification of malignancy.
-Ab CT: ascites, however no pancreatitis
 
 
Problem list & Assessment: 
#Decompensated alcoholic liver cirrhosis
#Question of hepatorenal syndrome
#Hyponatremia
#Hypomagnesemia
#Borderline macrocytic anemia with unknown etiology, possibly secondary to 
esophageal varices/duodenitis, hemoglobin below 7
 
 
Hospital Course:
 
-Had a final round of paracentesis yesterday, successful drainage of 4L of 
fluid.
-Diagnostic para last week showed SAAG over 1.1 indicative of portal 
hypertension and no SBP.  Patient had paracentesis two days back, therapeutic 
and diagnostic, gram stain, cell count and differential, culture and 
sensitivities, cytology, ascites albumin, LDH and total protein done. Again SAAG
greated than 1.1, no evidence of SBP.  Follow body fluid culture, so far 
negative.
-Ammonia level lower at 32.  If high we were going to increase lactulose.
-GI doubts hepatorenal syndrome as creatinine has remained stable.  With the 
large volume paracentesis we gave 8 g of albumin for every liter withdrawn.  We 
held all fluids as we believe this is contributing to her ascites.  We also held
fluids as patient was hyponatremic which has resolved. We will minimize IV 
medications but will not restrict PO fluids.  Patient is no longer hyponatremic.
 
-Labs done last night showed normal calcium, magnesium, B12, folate, thyroid 
function tests.  We will follow up BEP.
-Patient has had continuous diarrhea.  Holding parameter for lactulose is 
diarrhea, current dose is 20 q4h.  She will go home on lactulose with holding 
parameters for greater than 2 stools a day.
-As per GI yesterday we restarted octreotide 100 mg 3 times a day SC, continue 
midodrine, continue rifaxamin 550mg bid.  We stopped narcotics as this could be 
causing confusion as well. 
-Patient had EGD for potential banding, no varicies found but severe 
hypertensive gastropathy was noted.
-PT has continued to ambulate the patient and she has been noted walking floors 
at ease.
-Continue to give ensure, protein levels in the ensure are not high enough to 
cause concern especially as she does not have hepatorenal syndrome (as per 
nutrition).  patient does qualify for mild protein calorie malnutrition.
-Replete electrolytes as needed.
-Give Kayexalate for levels greater than 5.6.

- we assessed for nystagmus with plan that, if present, we would start high dose
thiamine for wenickes encephalopahty, no nystagmus noted.  
-Patient was set to leave yesterday but INR was found to be trending upwards 
which showed that patient may be leading towards fulminant liver failure so we 
kept the patient and retest the INR and LFTs which showed that they were stable 
even after 10 mg vitamin K by mouth.  After speaking with Luis F Medina, 
gastroenterologist, it was decided that the patient would be discharged with 
these numbers as it seems to be somewhat her baseline as she is coagulopathic 
and she will follow-up closely with gastroenterologist Dr. Caceres and with her 
PCP to schedule for repeat therapeutic paracentesis.  We will also refer her to 
the [facility] where she will follow-up for potential 
transplant if she can remain alcohol free for 6 months or more.  Compliance has 
been stressed.
 
 
 
DVT prophylaxis ALPS
Clear liquid diet was 1000 cc fluid restriction
Full Code
Problem List:
 1. Alcoholic cirrhosis of liver with ascites
 
Pain Rating: 3
Pain Location:
Right abdomen
Pain Goal: Pain 4 or less
Pain Plan:
When necessary
Tomorrow's Labs & Rationales:
Patient is to be discharged

## 2018-06-15 ENCOUNTER — HOSPITAL ENCOUNTER (EMERGENCY)
Dept: HOSPITAL 68 - ERH | Age: 33
LOS: 1 days | End: 2018-06-16
Payer: COMMERCIAL

## 2018-06-15 VITALS — WEIGHT: 94 LBS | HEIGHT: 67 IN | BODY MASS INDEX: 14.75 KG/M2

## 2018-06-15 DIAGNOSIS — R10.84: Primary | ICD-10-CM

## 2018-06-16 VITALS — SYSTOLIC BLOOD PRESSURE: 108 MMHG | DIASTOLIC BLOOD PRESSURE: 72 MMHG

## 2018-06-16 NOTE — ED GI/GU/ABDOMINAL COMPLAINT
History of Present Illness
 
General
Chief Complaint: Abdominal Pain/Flank Pain
Stated Complaint: "ABD PAIN RAIDIATING TO BACK ANF FLANK"
Source: patient
Exam Limitations: no limitations
 
Vital Signs & Intake/Output
Vital Signs & Intake/Output
 Vital Signs
 
 
Date Time Temp Pulse Resp B/P B/P Pulse O2 O2 Flow FiO2
 
     Mean Ox Delivery Rate 
 
06/16 0052 97.8 95 18 108/72  98 Room Air  
 
06/16 0051      97 Room Air  
 
06/15 2200  96 18 106/70  97 Room Air  
 
 
 ED Intake and Output
 
 
 06/16 0000 06/15 1200
 
Intake Total  
 
Output Total  
 
Balance  
 
   
 
Patient 94 lb 0.01 oz 
 
Weight  
 
Weight Reported by Patient 
 
Measurement  
 
Method  
 
 
 
Allergies
Coded Allergies:
acetaminophen (From TYLENOL) (HIVES 05/23/18)
ibuprofen (From MOTRIN) (HIVES 05/23/18)
 
Reconcile Medications
Calcium Carb/Magnesium Hydrox (Rolaids Chewable Tablet) (Unknown Strength) 
TAB.CHEW   (Unknown Dose) PO DAILY SUPPLEMENT  (Reported)
Ferrous Sulfate 325 MG (65 MG IRON) TABLET.   325 MG PO BID anemia
     .
Folic Acid 1 MG TABLET   1 MG PO DAILY vitamin deficiency
     .
Lactulose 20 GRAM/30 ML SOLUTION   20 GM PO Q8 liver failure
     PLEASE HOLD FOR more than 2 stools per day
Midodrine HCl 2.5 MG TABLET   7.5 MG PO TID liver failure
     ..
Nicotine (Nicotine Patch) 14 MG/24 HOUR PATCH.TD24   14 MG TOP DAILY AS NEEDED 
PRN tobacco use
     .
Omeprazole 20 MG CAPSULE.DR   40 MG PO DAILY AC Acid reflux
Oxycodone HCl 5 MG TABLET   1 TAB PO TID PRN pain
     ten...[id number]
Phytonadione (Mephyton) 5 MG TABLET   10 MG PO DAILY BLOOD CLOTTING
     .
Riboflavin (Vitamin B-2) 25 MG TABLET   1 TAB PO DAILY SUPPLEMENT  (Reported)
Rifaximin (Xifaxan) 550 MG TABLET   550 MG PO BID liver failure
     ..
Thiamine HCl (Vitamin B-1) 100 MG TABLET   100 MG PO DAILY vitamin deficiency
     .
Tramadol HCl (Ultram) 50 MG TABLET   1 TAB PO BIDP PRN pain
 
Triage Note:
PT FROM HOME C/O ALL QUADRANTS ABD PAIN SINCE THIS
 AFTERNOON POST TAP. PT STATES PAIN IS 7/10, DENIES
 SELF MEDICATING. PTS VSS. PT STATES SHE CAME INTO
 FOR A TAP OF HER RIGHT SIDE OF HER ABD, PT SATES
 LEFT SIDE BOTHERING PT MORE THAN USUAL.
Triage Nurses Notes Reviewed? yes
Pregnant? n
Is pt currently breastfeeding? No
Duration: week(s):, waxing and waning
Timing: recent history
Quality/Severity: cramping
Location: generalized abdomen
Radiation: no radiation
Activities at Onset: none
Prior Abdominal Problems: none
HPI:
31 yo woman with etoh related cirrhosis and ascites.
 
She reports, "I had a paracentesis today ovaries get abdominal pain.  I don't 
want any labs drawn.  I just need some pain medications and I'll be okay."
 
She notes mild diffuse abdominal pain and also localized around the site of her 
paracentesis.  She states, "this is how it always is.  I've been tapped 6 times.
"

She has no fever chills nausea vomiting diarrhea.  She is otherwise well.
 
Past History
 
Travel History
Traveled to [place] past 21 day No
 
Medical History
Any Pertinent Medical History? see below for history
Neurological: NONE
EENT: NONE
Cardiovascular: NONE
Respiratory: NONE
Gastrointestinal: NONE
Hepatic: cirrhosis, ?LIVER FAILURE
Renal: NONE
Musculoskeletal: NONE
Psychiatric: alcohol dependence
Endocrine: NONE
Blood Disorders: anemia
Cancer(s): NONE
GYN/Reproductive: NONE
History of MRSA: No
History of VRE: No
History of CDIFF: No
 
Surgical History
Surgical History: none
 
Psychosocial History
Who do you live with Patient and family
Services at Home None
What is your primary language English
Tobacco Use: Never used
 
Family History
Family History, If Any:
FATHER (CAD).
 
Hx Contributory? No
 
Review of Systems
 
Review of Systems
Constitutional:
Reports: no symptoms. 
EENTM:
Reports: no symptoms. 
Respiratory:
Reports: no symptoms. 
Cardiovascular:
Reports: no symptoms. 
GI:
Reports: no symptoms. 
Genitourinary:
Reports: no symptoms. 
Musculoskeletal:
Reports: no symptoms. 
Skin:
Reports: no symptoms. 
Neurological/Psychological:
Reports: no symptoms. 
Hematologic/Endocrine:
Reports: no symptoms. 
Immunologic/Allergic:
Reports: no symptoms. 
All Other Systems: Reviewed and Negative
 
Physical Exam
 
Physical Exam
Gastrointestinal: see below
Comments:
Review of Systems - except as otherwise noted in HPI
 
Review of Systems
Constitutional:no symptoms. 
EENTM:no symptoms. 
Respiratory:no symptoms. 
Cardiovascular:no symptoms. 
GI:no symptoms. 
Genitourinary:no symptoms. 
Musculoskeletal:no symptoms. 
Skin:no symptoms. 
Neurological/Psychological:no symptoms. 
Hematologic/Endocrine:no symptoms. 
Immunologic/Allergic:no symptoms. 
All Other Systems: Reviewed and Negative
 
Physical Exam
 
Physical Exam
General Appearance: well developed/nourished, no apparent distress
Head: atraumatic, normal appearance
Eyes:
Bilateral: normal appearance. 
Ears, Nose, Throat: normal pharynx, normal ENT inspection
Neck: normal inspection, supple, full range of motion
Respiratory: normal breath sounds, chest non-tender, no respiratory distress, 
quiet respiration, lungs clear
Cardiovascular: regular rate/rhythm
Gastrointestinal: Minimal diffuse tenderness without distention.  No rebound no 
guarding.
Back: normal inspection, normal range of motion
Extremities: normal inspection, normal capillary refill, normal range of motion,
no edema
Neurologic/Psych: no motor/sensory deficits, awake, alert, oriented x 3
Skin: intact, normal color, warm/dry
 
Core Measures
ACS in differential dx? No
Sepsis Present: No
Sepsis Focused Exam Completed? No
 
Progress
Differential Diagnosis: local abdominal discomfort due to paracentesis versus 
SBP versus other.
Plan of Care:
 Laboratory Tests
 
 
 
06/15/18 2220:
Total Beta HCG Cancelled, CBC w Diff Cancelled, WBC Cancelled, RBC Cancelled, 
Hgb Cancelled, Hct Cancelled, MCV Cancelled, MCH Cancelled, MCHC Cancelled, RDW 
Cancelled, Plt Count Cancelled, MPV Cancelled, Urine Color Cancelled, Urine 
Clarity Cancelled, Urine pH Cancelled, Ur Specific Gravity Cancelled, Urine 
Protein Cancelled, Urine Ketones Cancelled, Urine Nitrite Cancelled, Urine 
Bilirubin Cancelled, Urine Urobilinogen Cancelled, Ur Leukocyte Esterase 
Cancelled, Ur Microscopic Cancelled, Urine Hemoglobin Cancelled, Urine Glucose 
Cancelled
 
Initial ED EKG: none
 
Departure
 
Departure
Disposition: HOME OR SELF CARE
Condition: Stable
Clinical Impression
Primary Impression: Abdominal pain
Referrals:
Marianela CORNEJO,Dudley CARROLL (PCP/Family)
 
Departure Forms:
Customer Survey
General Discharge Information
Prescriptions:
Current Visit Scripts
Oxycodone HCl 1 TAB PO TID PRN pain 
     #10 TAB 
     ten...[id number]
 
 
Comments
Patient insists on not drawing blood work.  She states that her symptoms are 
consistent with her prior episodes of paracentesis.  She requests a short course
of oxycodone and will follow-up with her PMD and her specialty caregivers.  She 
denies drinking.

## 2018-06-29 ENCOUNTER — HOSPITAL ENCOUNTER (EMERGENCY)
Dept: HOSPITAL 68 - ERH | Age: 33
End: 2018-06-29
Payer: COMMERCIAL

## 2018-06-29 VITALS — WEIGHT: 93 LBS | HEIGHT: 67 IN | BODY MASS INDEX: 14.6 KG/M2

## 2018-06-29 VITALS — SYSTOLIC BLOOD PRESSURE: 103 MMHG | DIASTOLIC BLOOD PRESSURE: 66 MMHG

## 2018-06-29 DIAGNOSIS — R10.9: Primary | ICD-10-CM

## 2018-06-29 NOTE — ED GENERAL ADULT
History of Present Illness
 
General
Chief Complaint: General Adult
Stated Complaint: MED REFILL
Source: patient
Exam Limitations: no limitations
 
Vital Signs & Intake/Output
Vital Signs & Intake/Output
 Vital Signs
 
 
Date Time Temp Pulse Resp B/P B/P Pulse O2 O2 Flow FiO2
 
     Mean Ox Delivery Rate 
 
06/29 1104 96.4 98 18 103/66  95 Room Air  
 
 
 
Allergies
Coded Allergies:
acetaminophen (From TYLENOL) (HIVES 05/23/18)
ibuprofen (From MOTRIN) (HIVES 05/23/18)
 
Reconcile Medications
Calcium Carb/Magnesium Hydrox (Rolaids Chewable Tablet) (Unknown Strength) 
TAB.CHEW   (Unknown Dose) PO DAILY SUPPLEMENT  (Reported)
Ferrous Sulfate 325 MG (65 MG IRON) TABLET.DR   325 MG PO BID anemia
     .
Folic Acid 1 MG TABLET   1 MG PO DAILY vitamin deficiency
     .
Lactulose 20 GRAM/30 ML SOLUTION   20 GM PO Q8 liver failure
     PLEASE HOLD FOR more than 2 stools per day
Lidocaine (Lidoderm) 5 % ADH..PATCH   1 PAT TOP DAILY PRN PAIN
     may wear up to 12 hours
Midodrine HCl 2.5 MG TABLET   7.5 MG PO TID liver failure
     ..
Nicotine (Nicotine Patch) 14 MG/24 HOUR PATCH.TD24   14 MG TOP DAILY AS NEEDED 
PRN tobacco use
     .
Omeprazole 20 MG CAPSULE.DR   40 MG PO DAILY AC Acid reflux
Oxycodone HCl 5 MG TABLET   1 TAB PO TID PRN pain
     ten...[id number]
Phytonadione (Mephyton) 5 MG TABLET   10 MG PO DAILY BLOOD CLOTTING
     .
Riboflavin (Vitamin B-2) 25 MG TABLET   1 TAB PO DAILY SUPPLEMENT  (Reported)
Rifaximin (Xifaxan) 550 MG TABLET   550 MG PO BID liver failure
     ..
Thiamine HCl (Vitamin B-1) 100 MG TABLET   100 MG PO DAILY vitamin deficiency
     .
Tramadol HCl (Ultram) 50 MG TABLET   1 TAB PO BIDP PRN pain
 
Triage Note:
PT HERE FOR MED REFILL OF HER PERSCRIPTION FOR
OXYCODONE.
Triage Nurses Notes Reviewed? yes
Onset: Gradual
Duration: week(s):
Timing: constant
Pregnant: No
Patient currently breastfeeds: No
HPI:
32-year-old female with history of EtOH abuse and cirrhosis presenting for 
refill of her oxycodone.  Patient has been seen in the emergency department 3 
times in the past month requesting pain medications - one visit for back pain 
and 2 visits for postprocedural pain from a therapeutic paracentesis.  Patient 
presents today because she states that she had a therapeutic paracentesis with 2
L of fluid removed by Dr. Zayas, and was not sent home with any pain medication.
 Patient states that she was instructed by Dr. Zayas to come to the emergency 
department for pain medication.  She states that her current pain is the same 
pain that she always has after her paracentesis procedure, there is nothing new 
or different about the pain.  Patient states that she has allergies to Tylenol 
and NSAIDs and is unable to take these medications for pain relief.  Denies 
fevers, nausea, vomiting, diarrhea, constipation.
(Kristine Soriano)
 
Past History
 
Travel History
Traveled to [place] past 21 day No
 
Medical History
Any Pertinent Medical History? see below for history
Neurological: NONE
EENT: NONE
Cardiovascular: NONE
Respiratory: NONE
Gastrointestinal: NONE
Hepatic: cirrhosis, ?LIVER FAILURE
Renal: NONE
Musculoskeletal: NONE
Psychiatric: alcohol dependence
Endocrine: NONE
Blood Disorders: anemia
Cancer(s): NONE
GYN/Reproductive: NONE
History of MRSA: No
History of VRE: No
History of CDIFF: No
 
Surgical History
Surgical History: none
 
Psychosocial History
Who do you live with Patient and family
Services at Home None
What is your primary language English
Tobacco Use: Current Daily Use
Daily Tobacco Use Amount/Type: =< 4 Cigarettes daily
ETOH Use: denies use
Illicit Drug Use: denies illicit drug use
 
Family History
Family History, If Any:
FATHER (CAD).
 
Hx Contributory? No
(Kristine Soriano)
 
Review of Systems
 
Review of Systems
Constitutional:
Reports: no symptoms. 
EENTM:
Reports: no symptoms. 
Respiratory:
Reports: no symptoms. 
Cardiovascular:
Reports: no symptoms. 
GI:
Reports: see HPI. 
Genitourinary:
Reports: no symptoms. 
Musculoskeletal:
Reports: no symptoms. 
Skin:
Reports: no symptoms. 
Neurological/Psychological:
Reports: no symptoms. 
Hematologic/Endocrine:
Reports: no symptoms. 
Immunologic/Allergic:
Reports: no symptoms. 
(Kristine Soriano)
 
Physical Exam
 
Physical Exam
General Appearance: no apparent distress, alert, awake, comfortable, cachetic
Head: atraumatic, normal appearance
Eyes:
Bilateral: normal appearance. 
Neck: normal inspection
Respiratory: normal breath sounds, lungs clear
Cardiovascular: regular rate/rhythm
Gastrointestinal: soft, non-tender
Back: normal inspection
Extremities: normal inspection
Neurologic/Psych: awake, alert, oriented x 3, normal gait, normal mood/affect
Skin: intact, normal color, warm/dry
 
Core Measures
ACS in differential dx? No
CVA/TIA Diagnosis: No
Sepsis Present: No
Sepsis Focused Exam Completed? No
(Kristine Soriano)
 
Progress
Differential Diagnoses
I considered the following diagnoses in my evaluation of the patient: [
Postprocedural pain versus opiate abuse versus malingering, low concern for SBP]
 
Plan of Care:
Low concern for SBP as the patient's abdomen is soft and nontender. Patient 
given Rx Lidoderm patches to use for pain at her procedure sites.  Given contact
information to establish care with pain management if she is going to require 
chronic narcotics for her recurrent therapeutic paracentesis procedures.  Will 
follow up with pain management and Dr. Zayas.  Given strict return precautions.
Initial ED EKG: none
(Kristine Soriano)
 
Departure
 
Departure
Disposition: HOME OR SELF CARE
Condition: Stable
Clinical Impression
Primary Impression: Abdominal pain
Referrals:
Marianela CORNEJO,Dudley CARROLL (PCP/Family)
 
Raffi Samaniego MD
 
Additional Instructions:
Use lidoderm patches as needed for pain. Follow up with pain management for re-
evaluation. Return to the emergency department for any new or worsening 
symptoms. 
Departure Forms:
Customer Survey
General Discharge Information
Prescriptions:
Current Visit Scripts
Lidocaine (Lidoderm) 1 PAT TOP DAILY PRN PAIN 
     #30 PAT 
     may wear up to 12 hours
 
 
(Kristine Soriano)
 
PA/NP Co-Sign Statement
Statement:
ED Attending supervision documentation-
 
[] I saw and evaluated the patient. I have also reviewed all the pertinent lab 
results and diagnostic results. I agree with the findings and the plan of care 
as documented in the PA's/NP's documentation. 
 
[x] I have reviewed the ED Record and agree with the PA's/NP's documentation.
 
[] Additions or exceptions (if any) to the PAs/NP's note and plan are 
summarized below:
[]
 
(Bill Hebert DO)
 
Critical Care Note
 
Critical Care Note
Critical Care Time: non-applicable
(Kristine Soriano)

## 2018-07-26 NOTE — PROC NOTE ENDOSCOPY
Endoscopy Procedure
Medical History: unchanged
Mental Status: alert/oriented
Heart/Lung Eval Prior to Sedation: within normal limits
Candidate for Sedation? Yes
Procedure Date: 05/30/18
Procedure Type: EGD
:
MD Walters Deborah E.
 
ASA Classification: III
Indications:
Esophageal Varices
Instrument: diagnostic gastroscope
Meds Received: MAC
Patient's Tolerance: good
Complications: none
Extent Reached: second part of duodenum
Procedure:
Note: Informed consent was obtained prior to procedure.  Risks and benefits of 
procedure were discussed with patient.  Potential complications discussed 
included perforation, bleeding, abdominal pain, and adverse reaction to 
medications.  It was explained that iany or all of these complications could 
result in the need for extended hospitalization, emergency surgery, transfusion 
of packed red blood cells (with the risk of HIV or hepatitis virus), intubation 
with mechanical ventilation, and possible need for antibiotics.  It was further 
explained that an existing tumor polyp or mucosal abnormality might not be 
identified at the time of the procedure thus resulting in a missed opportunity 
for early diagnosis and treatment of a gastrointestinal malignancy or disease 
with possible interval development of a gastrointestinal cancer or other disease
with possible worsening of clinical condition in the interval between 
endoscopies.  It was also discussed that complications are not limited to those 
listed above.  Possible alternatives to endoscopic treatment or evaluation were 
discussed.  All questions were answered.
 
Continuous EKG and blood pressure monitors were attached.  Supplemental oxygen 
was provided with O2 Sat monitoring.  
 
Patient was placed in the left lateral decubitus position.  A surgical timeout 
was performed.  All persons in the room were identified.  All concerns were 
expressed and answered.
 
A bite block was placed in the mouth and sedation was administered by anesthesia
and titrated to comfort prior to starting the procedure.  The Olympus upper 
endoscope was advanced under direct vision to the level of the third portion of 
the duodenum.
 
Esophagus:  The GE junction was identified and was normal.  The Z line was 
located at 37 cm from the incisors.  There were 3 columns of grade 1 esophageal 
varices beginning at 39 cm from the incisors and which completely effaced with 
air insufflation.  There were no stigmata of bleeding associated with these 
columns.  The mucosa was quite thinned throughout the entire esophagus..  At the
GE junction there were red whale sign and again thinning of the mucosa.
 
Stomach: The stomach had diffuse erythema with reticulation and hemorrhagic 
spots throughout its entirety.  Retroflexed view of the cardiofundic region 
revealed diffuse erythema, edema, and a snake-skinned reticulated pattern with 
hemorrhagic spots consistent with severe portal hypertensive gastropathy.  There
were normal rugae and normal distensibility.  There were no gastric varices The 
pylorus was patent and easily intubated. 
 
Duodenum: The duodenum was fully examined from bulb down to the third portion.  
There was diffuse erythema and edema throughout.
 
With the endoscope in the forward-viewing position, it was slowly withdrawn and 
all areas were re-inspected and findings are as described previously.  Patient 
tolerated the procedure well.
 
EBL:  None
 
Specimens Removed:  None
 
Findings:
1.  Severe portal hypertensive gastropathy
2.  Grade 1 esophageal varices with complete effacement and no stigmata of 
bleeding
3.  Varices at GE junction with complete effacement, thinned mucosa and stigmata
of bleeding.  Question GOV 1.
4.  Duodenitis
 
Impression:
1.  Severe portal hypertensive gastropathy
2.  Grade 1 esophageal varices with complete effacement and no stigmata of 
bleeding
3.  Varices at GE junction with complete effacement, thinned mucosa and stigmata
of bleeding.  Question GOV 1.
4.  Duodenitis
 
Given thinned mucosa over varices beginning at 39 cm as well as no stigmata of 
bleeding no banding was done.  I believe the varices noted at the GE junction 
are GOV1 and also because of the thinne mucosa were not banded given concerns 
regarding post banding ulceration and bleeding.
 
Patient is not a candidate for primary prophylaxis with beta blockers given her 
probable hepatorenal syndrome as well as her ascites.
 
Recommendations:
1.  Resume diet
2.  Continue octreotide and albumin as well as midrodrine
3.  Consider repeat paracentesis tomorrow
4.  Start D/C planning
5.  Physical Therapy Consultation No
